# Patient Record
Sex: MALE | Race: WHITE | NOT HISPANIC OR LATINO | Employment: OTHER | URBAN - METROPOLITAN AREA
[De-identification: names, ages, dates, MRNs, and addresses within clinical notes are randomized per-mention and may not be internally consistent; named-entity substitution may affect disease eponyms.]

---

## 2024-05-24 ENCOUNTER — APPOINTMENT (OUTPATIENT)
Dept: CT IMAGING | Facility: HOSPITAL | Age: 49
End: 2024-05-24
Payer: MEDICAID

## 2024-05-24 ENCOUNTER — HOSPITAL ENCOUNTER (INPATIENT)
Facility: HOSPITAL | Age: 49
LOS: 5 days | Discharge: HOME | End: 2024-05-29
Attending: EMERGENCY MEDICINE | Admitting: FAMILY MEDICINE
Payer: MEDICAID

## 2024-05-24 DIAGNOSIS — R19.7 BLOODY DIARRHEA: ICD-10-CM

## 2024-05-24 DIAGNOSIS — R55 PRE-SYNCOPE: ICD-10-CM

## 2024-05-24 DIAGNOSIS — K92.2 ACUTE UPPER GI BLEED: ICD-10-CM

## 2024-05-24 DIAGNOSIS — R10.84 ACUTE GENERALIZED ABDOMINAL PAIN: ICD-10-CM

## 2024-05-24 DIAGNOSIS — T39.395A NSAIDS ADVERSE REACTION, INITIAL ENCOUNTER: ICD-10-CM

## 2024-05-24 DIAGNOSIS — R19.7 ACUTE DIARRHEA: ICD-10-CM

## 2024-05-24 DIAGNOSIS — K92.1 BLOOD IN STOOL: Primary | ICD-10-CM

## 2024-05-24 DIAGNOSIS — N20.1 LEFT URETERAL STONE: ICD-10-CM

## 2024-05-24 LAB
ABO GROUP BLD: NORMAL
ABO GROUP BLD: NORMAL
ALBUMIN SERPL-MCNC: 4.1 G/DL (ref 3.5–5.2)
ALBUMIN/GLOB SERPL: 1.5 G/DL
ALP SERPL-CCNC: 74 U/L (ref 39–117)
ALT SERPL W P-5'-P-CCNC: 16 U/L (ref 1–41)
ANION GAP SERPL CALCULATED.3IONS-SCNC: 11.3 MMOL/L (ref 5–15)
AST SERPL-CCNC: 15 U/L (ref 1–40)
BACTERIA UR QL AUTO: ABNORMAL /HPF
BASOPHILS # BLD AUTO: 0.03 10*3/MM3 (ref 0–0.2)
BASOPHILS NFR BLD AUTO: 0.2 % (ref 0–1.5)
BILIRUB SERPL-MCNC: 0.6 MG/DL (ref 0–1.2)
BILIRUB UR QL STRIP: NEGATIVE
BLD GP AB SCN SERPL QL: NEGATIVE
BUN SERPL-MCNC: 25 MG/DL (ref 6–20)
BUN/CREAT SERPL: 13.2 (ref 7–25)
CALCIUM SPEC-SCNC: 8.9 MG/DL (ref 8.6–10.5)
CHLORIDE SERPL-SCNC: 96 MMOL/L (ref 98–107)
CLARITY UR: CLEAR
CO2 SERPL-SCNC: 28.7 MMOL/L (ref 22–29)
COLOR UR: YELLOW
CREAT SERPL-MCNC: 1.89 MG/DL (ref 0.76–1.27)
D-LACTATE SERPL-SCNC: 1.1 MMOL/L (ref 0.5–2)
DEPRECATED RDW RBC AUTO: 43.8 FL (ref 37–54)
EGFRCR SERPLBLD CKD-EPI 2021: 43 ML/MIN/1.73
EOSINOPHIL # BLD AUTO: 0.1 10*3/MM3 (ref 0–0.4)
EOSINOPHIL NFR BLD AUTO: 0.7 % (ref 0.3–6.2)
ERYTHROCYTE [DISTWIDTH] IN BLOOD BY AUTOMATED COUNT: 14.2 % (ref 12.3–15.4)
GLOBULIN UR ELPH-MCNC: 2.8 GM/DL
GLUCOSE SERPL-MCNC: 117 MG/DL (ref 65–99)
GLUCOSE UR STRIP-MCNC: NEGATIVE MG/DL
HCT VFR BLD AUTO: 36.2 % (ref 37.5–51)
HEMOCCULT STL QL IA: POSITIVE
HGB BLD-MCNC: 11.9 G/DL (ref 13–17.7)
HGB UR QL STRIP.AUTO: NEGATIVE
HOLD SPECIMEN: NORMAL
HOLD SPECIMEN: NORMAL
HYALINE CASTS UR QL AUTO: ABNORMAL /LPF
IMM GRANULOCYTES # BLD AUTO: 0.07 10*3/MM3 (ref 0–0.05)
IMM GRANULOCYTES NFR BLD AUTO: 0.5 % (ref 0–0.5)
INR PPP: 0.96 (ref 0.86–1.15)
KETONES UR QL STRIP: NEGATIVE
LEUKOCYTE ESTERASE UR QL STRIP.AUTO: NEGATIVE
LYMPHOCYTES # BLD AUTO: 0.88 10*3/MM3 (ref 0.7–3.1)
LYMPHOCYTES NFR BLD AUTO: 6.3 % (ref 19.6–45.3)
MAGNESIUM SERPL-MCNC: 2.1 MG/DL (ref 1.6–2.6)
MCH RBC QN AUTO: 27.8 PG (ref 26.6–33)
MCHC RBC AUTO-ENTMCNC: 32.9 G/DL (ref 31.5–35.7)
MCV RBC AUTO: 84.6 FL (ref 79–97)
MONOCYTES # BLD AUTO: 0.79 10*3/MM3 (ref 0.1–0.9)
MONOCYTES NFR BLD AUTO: 5.6 % (ref 5–12)
NEUTROPHILS NFR BLD AUTO: 12.13 10*3/MM3 (ref 1.7–7)
NEUTROPHILS NFR BLD AUTO: 86.7 % (ref 42.7–76)
NITRITE UR QL STRIP: NEGATIVE
NRBC BLD AUTO-RTO: 0 /100 WBC (ref 0–0.2)
PH UR STRIP.AUTO: 7 [PH] (ref 5–8)
PLATELET # BLD AUTO: 283 10*3/MM3 (ref 140–450)
PMV BLD AUTO: 10.4 FL (ref 6–12)
POTASSIUM SERPL-SCNC: 3 MMOL/L (ref 3.5–5.2)
PROT SERPL-MCNC: 6.9 G/DL (ref 6–8.5)
PROT UR QL STRIP: ABNORMAL
PROTHROMBIN TIME: 12.9 SECONDS (ref 11.8–14.9)
RBC # BLD AUTO: 4.28 10*6/MM3 (ref 4.14–5.8)
RBC # UR STRIP: ABNORMAL /HPF
REF LAB TEST METHOD: ABNORMAL
RH BLD: POSITIVE
RH BLD: POSITIVE
SODIUM SERPL-SCNC: 136 MMOL/L (ref 136–145)
SP GR UR STRIP: 1.01 (ref 1–1.03)
SQUAMOUS #/AREA URNS HPF: ABNORMAL /HPF
T&S EXPIRATION DATE: NORMAL
UROBILINOGEN UR QL STRIP: ABNORMAL
WBC # UR STRIP: ABNORMAL /HPF
WBC NRBC COR # BLD AUTO: 14 10*3/MM3 (ref 3.4–10.8)
WHOLE BLOOD HOLD COAG: NORMAL
WHOLE BLOOD HOLD SPECIMEN: NORMAL

## 2024-05-24 PROCEDURE — 86900 BLOOD TYPING SEROLOGIC ABO: CPT

## 2024-05-24 PROCEDURE — 74177 CT ABD & PELVIS W/CONTRAST: CPT

## 2024-05-24 PROCEDURE — 83735 ASSAY OF MAGNESIUM: CPT | Performed by: EMERGENCY MEDICINE

## 2024-05-24 PROCEDURE — 86901 BLOOD TYPING SEROLOGIC RH(D): CPT

## 2024-05-24 PROCEDURE — 86900 BLOOD TYPING SEROLOGIC ABO: CPT | Performed by: EMERGENCY MEDICINE

## 2024-05-24 PROCEDURE — 86850 RBC ANTIBODY SCREEN: CPT | Performed by: EMERGENCY MEDICINE

## 2024-05-24 PROCEDURE — 36415 COLL VENOUS BLD VENIPUNCTURE: CPT | Performed by: EMERGENCY MEDICINE

## 2024-05-24 PROCEDURE — 99285 EMERGENCY DEPT VISIT HI MDM: CPT

## 2024-05-24 PROCEDURE — 85025 COMPLETE CBC W/AUTO DIFF WBC: CPT

## 2024-05-24 PROCEDURE — 82274 ASSAY TEST FOR BLOOD FECAL: CPT

## 2024-05-24 PROCEDURE — 81001 URINALYSIS AUTO W/SCOPE: CPT

## 2024-05-24 PROCEDURE — 36415 COLL VENOUS BLD VENIPUNCTURE: CPT

## 2024-05-24 PROCEDURE — 25810000003 SODIUM CHLORIDE 0.9 % SOLUTION: Performed by: EMERGENCY MEDICINE

## 2024-05-24 PROCEDURE — 86901 BLOOD TYPING SEROLOGIC RH(D): CPT | Performed by: EMERGENCY MEDICINE

## 2024-05-24 PROCEDURE — 83605 ASSAY OF LACTIC ACID: CPT | Performed by: EMERGENCY MEDICINE

## 2024-05-24 PROCEDURE — 80053 COMPREHEN METABOLIC PANEL: CPT

## 2024-05-24 PROCEDURE — 85610 PROTHROMBIN TIME: CPT | Performed by: EMERGENCY MEDICINE

## 2024-05-24 PROCEDURE — 25510000001 IOPAMIDOL PER 1 ML: Performed by: EMERGENCY MEDICINE

## 2024-05-24 RX ORDER — SODIUM CHLORIDE 0.9 % (FLUSH) 0.9 %
10 SYRINGE (ML) INJECTION AS NEEDED
Status: DISCONTINUED | OUTPATIENT
Start: 2024-05-24 | End: 2024-05-29 | Stop reason: HOSPADM

## 2024-05-24 RX ORDER — TAMSULOSIN HYDROCHLORIDE 0.4 MG/1
1 CAPSULE ORAL DAILY
COMMUNITY
End: 2024-05-24

## 2024-05-24 RX ORDER — SODIUM CHLORIDE 9 MG/ML
100 INJECTION, SOLUTION INTRAVENOUS CONTINUOUS
Status: ACTIVE | OUTPATIENT
Start: 2024-05-24 | End: 2024-05-25

## 2024-05-24 RX ORDER — AMLODIPINE BESYLATE 10 MG/1
1 TABLET ORAL DAILY
COMMUNITY
Start: 2024-03-05

## 2024-05-24 RX ORDER — SODIUM CHLORIDE 0.9 % (FLUSH) 0.9 %
10 SYRINGE (ML) INJECTION EVERY 12 HOURS SCHEDULED
Status: DISCONTINUED | OUTPATIENT
Start: 2024-05-24 | End: 2024-05-29 | Stop reason: HOSPADM

## 2024-05-24 RX ORDER — ASPIRIN 325 MG
1 TABLET, DELAYED RELEASE (ENTERIC COATED) ORAL DAILY
COMMUNITY
Start: 2024-03-05 | End: 2024-05-29 | Stop reason: HOSPADM

## 2024-05-24 RX ORDER — POTASSIUM CHLORIDE 750 MG/1
40 CAPSULE, EXTENDED RELEASE ORAL ONCE
Status: COMPLETED | OUTPATIENT
Start: 2024-05-24 | End: 2024-05-25

## 2024-05-24 RX ORDER — PANTOPRAZOLE SODIUM 40 MG/1
1 TABLET, DELAYED RELEASE ORAL DAILY
COMMUNITY
Start: 2024-02-13

## 2024-05-24 RX ORDER — ONDANSETRON 2 MG/ML
4 INJECTION INTRAMUSCULAR; INTRAVENOUS EVERY 6 HOURS PRN
Status: DISCONTINUED | OUTPATIENT
Start: 2024-05-24 | End: 2024-05-29 | Stop reason: HOSPADM

## 2024-05-24 RX ORDER — TAMSULOSIN HYDROCHLORIDE 0.4 MG/1
0.4 CAPSULE ORAL DAILY
Status: DISCONTINUED | OUTPATIENT
Start: 2024-05-24 | End: 2024-05-29 | Stop reason: HOSPADM

## 2024-05-24 RX ORDER — METOPROLOL SUCCINATE 50 MG/1
50 TABLET, EXTENDED RELEASE ORAL EVERY EVENING
COMMUNITY

## 2024-05-24 RX ORDER — HYDRALAZINE HYDROCHLORIDE 25 MG/1
25 TABLET, FILM COATED ORAL 2 TIMES DAILY
COMMUNITY

## 2024-05-24 RX ORDER — SEMAGLUTIDE 0.68 MG/ML
0.5 INJECTION, SOLUTION SUBCUTANEOUS WEEKLY
COMMUNITY
Start: 2024-02-19 | End: 2024-06-03

## 2024-05-24 RX ORDER — PANTOPRAZOLE SODIUM 40 MG/10ML
80 INJECTION, POWDER, LYOPHILIZED, FOR SOLUTION INTRAVENOUS ONCE
Status: COMPLETED | OUTPATIENT
Start: 2024-05-24 | End: 2024-05-24

## 2024-05-24 RX ORDER — CLOPIDOGREL BISULFATE 75 MG/1
1 TABLET ORAL DAILY
COMMUNITY
Start: 2024-01-24

## 2024-05-24 RX ORDER — BISACODYL 5 MG/1
20 TABLET, DELAYED RELEASE ORAL ONCE
Qty: 4 TABLET | Refills: 0 | Status: COMPLETED | OUTPATIENT
Start: 2024-05-25 | End: 2024-05-25

## 2024-05-24 RX ORDER — POLYETHYLENE GLYCOL 3350 17 G/17G
17 POWDER ORAL DAILY
COMMUNITY
Start: 2024-05-04

## 2024-05-24 RX ORDER — SODIUM CHLORIDE 9 MG/ML
40 INJECTION, SOLUTION INTRAVENOUS AS NEEDED
Status: DISCONTINUED | OUTPATIENT
Start: 2024-05-24 | End: 2024-05-29 | Stop reason: HOSPADM

## 2024-05-24 RX ORDER — MORPHINE SULFATE 2 MG/ML
1 INJECTION, SOLUTION INTRAMUSCULAR; INTRAVENOUS EVERY 4 HOURS PRN
Status: DISCONTINUED | OUTPATIENT
Start: 2024-05-24 | End: 2024-05-29 | Stop reason: HOSPADM

## 2024-05-24 RX ORDER — LOSARTAN POTASSIUM AND HYDROCHLOROTHIAZIDE 25; 100 MG/1; MG/1
1 TABLET ORAL DAILY
COMMUNITY
Start: 2024-01-29

## 2024-05-24 RX ORDER — NALOXONE HCL 0.4 MG/ML
0.4 VIAL (ML) INJECTION
Status: DISCONTINUED | OUTPATIENT
Start: 2024-05-24 | End: 2024-05-29 | Stop reason: HOSPADM

## 2024-05-24 RX ORDER — ERGOCALCIFEROL 1.25 MG/1
1 CAPSULE ORAL WEEKLY
COMMUNITY
Start: 2024-02-12

## 2024-05-24 RX ORDER — CLONIDINE HYDROCHLORIDE 0.1 MG/1
1 TABLET ORAL 2 TIMES DAILY
COMMUNITY
Start: 2024-03-05

## 2024-05-24 RX ADMIN — IOPAMIDOL 100 ML: 755 INJECTION, SOLUTION INTRAVENOUS at 17:52

## 2024-05-24 RX ADMIN — SODIUM CHLORIDE 1000 ML: 9 INJECTION, SOLUTION INTRAVENOUS at 18:07

## 2024-05-24 RX ADMIN — PANTOPRAZOLE SODIUM 80 MG: 40 INJECTION, POWDER, FOR SOLUTION INTRAVENOUS at 18:06

## 2024-05-24 NOTE — ED PROVIDER NOTES
Time: 2:07 PM EDT  Date of encounter:  5/24/2024  Independent Historian/Clinical History and Information was obtained by:   Patient    History is limited by: N/A    Chief Complaint: GI bleeding, diarrhea      History of Present Illness:  Patient is a 49 y.o. year old male who presents to the emergency department for evaluation of bloody diarrhea that started last night.  Denies nausea or vomiting.    He states he has been having ongoing issues with diarrhea and alternating constipation ever since being started on Ozempic.    He took his last dose several days ago and has been having severe diarrhea this week and had some bloody stool last night and presents to the ED today but when he arrived to the emergency department he had a large dark or black stools that look like tar with some maroon blood present as well.    He is not on anticoagulant medication but does take Plavix and possibly aspirin.    After his bloody bowel movement here in the ED he felt lightheaded like he might pass out.    He has no previous history of diagnosed stomach ulcers but does have history of GERD.  No history of diverticulitis.    He now feels completely asymptomatic and denies any abdominal pain or nausea after this bowel movement in the ED.    HPI    Patient Care Team  Primary Care Provider: Manan Gipson APRN    Past Medical History:     No Known Allergies  History reviewed. No pertinent past medical history.  History reviewed. No pertinent surgical history.  History reviewed. No pertinent family history.    Home Medications:  Prior to Admission medications    Not on File        Social History:   Social History     Tobacco Use    Smoking status: Never    Smokeless tobacco: Never   Vaping Use    Vaping status: Never Used   Substance Use Topics    Alcohol use: Not Currently    Drug use: Never     Lives at home, family at the bedside.    Review of Systems:  Review of Systems   Gastrointestinal:  Positive for blood in stool and diarrhea.  "Negative for abdominal pain, nausea and vomiting.        Physical Exam:  /87 (BP Location: Right arm, Patient Position: Lying)   Pulse 93   Temp 98.6 °F (37 °C) (Oral)   Resp 20   Ht 177.8 cm (70\")   Wt 134 kg (295 lb 3.1 oz)   SpO2 97%   BMI 42.36 kg/m²       General: Awake alert and in no obvious distress    HEENT: Head normocephalic atraumatic, eyes PERRLA EOMI, nose normal, oropharynx normal.    Neck: Supple full range of motion, no meningismus, no lymphadenopathy    Heart: Regular rate and rhythm, no murmurs or rubs, 2+ radial pulses bilaterally    Lungs: Clear to auscultation bilaterally without wheezes or crackles, no respiratory distress    Abdomen: Soft, mildly tender throughout the upper abdomen but no peritonitis, nondistended, no rebound or guarding    Skin: Warm, dry, no rash    Musculoskeletal: Normal range of motion, no lower extremity edema    Neurologic: Oriented x3, no motor deficits no sensory deficits    Psychiatric: Mood appears stable, no psychosis          Procedures:  Procedures      Medical Decision Making:      Comorbidities that affect care:    None    External Notes reviewed:    Previous Labs: I compared today's lab results to his baseline labs and it looks like he is acutely anemic and elevated white blood cell count.      The following orders were placed and all results were independently analyzed by me:  Orders Placed This Encounter   Procedures    Clostridioides difficile Toxin - Stool, Per Rectum    Clostridioides difficile Toxin, PCR - Stool, Per Rectum    CT Abdomen Pelvis With Contrast    [place] Draw    Comprehensive Metabolic Panel    Occult Blood, Fecal By Immunoassay - Stool, Per Rectum    CBC Auto Differential    Urinalysis With Microscopic If Indicated (No Culture) - Urine, Clean Catch    Urinalysis, Microscopic Only - Urine, Clean Catch    Magnesium    Lactic Acid, Plasma    Protime-INR    NPO Diet NPO Type: Strict NPO    Undress & Gown    Measure Blood " Pressure    Vital Signs    Orthostatic Blood Pressure    Gastroenterology (on-call MD unless specified)    Hospitalist (on-call MD unless specified)    Urology (on-call MD unless specified)    Patient Isolation Contact Spore    Pulse Oximetry    Oxygen Therapy- Nasal Cannula; Titrate 1-6 LPM Per SpO2; 90 - 95%    Type & Screen    ABO RH Specimen Verification    Insert Peripheral IV    Insert Peripheral IV    CBC & Differential    Green Top (Gel)    Lavender Top    Gold Top - SST    Light Blue Top       Medications Given in the Emergency Department:  Medications   sodium chloride 0.9 % flush 10 mL (has no administration in time range)   sodium chloride 0.9 % flush 10 mL (has no administration in time range)   sodium chloride 0.9 % bolus 1,000 mL (0 mL Intravenous Stopped 5/24/24 2042)   pantoprazole (PROTONIX) injection 80 mg (80 mg Intravenous Given 5/24/24 1806)   iopamidol (ISOVUE-370) 76 % injection 100 mL (100 mL Intravenous Given 5/24/24 1752)        ED Course:    ED Course as of 05/24/24 2117   Fri May 24, 2024   1408 --- PROVIDER IN TRIAGE NOTE ---    The patient was evaluated by Theo castillo in triage. Orders were placed and the patient is currently awaiting disposition.    [AJ]      ED Course User Index  [AJ] Theo Maurer PA-C       Labs:    Lab Results (last 24 hours)       Procedure Component Value Units Date/Time    CBC & Differential [[id number]]  (Abnormal) Collected: 05/24/24 1415    Specimen: Blood from Arm, Right Updated: 05/24/24 1440    Narrative:      The following orders were created for panel order CBC & Differential.  Procedure                               Abnormality         Status                     ---------                               -----------         ------                     CBC Auto Differential[[id number]]        Abnormal            Final result                 Please view results for these tests on the individual orders.    Comprehensive Metabolic Panel  [[id number]]  (Abnormal) Collected: 05/24/24 1415    Specimen: Blood from Arm, Right Updated: 05/24/24 1457     Glucose 117 mg/dL      BUN 25 mg/dL      Creatinine 1.89 mg/dL      Sodium 136 mmol/L      Potassium 3.0 mmol/L      Chloride 96 mmol/L      CO2 28.7 mmol/L      Calcium 8.9 mg/dL      Total Protein 6.9 g/dL      Albumin 4.1 g/dL      ALT (SGPT) 16 U/L      AST (SGOT) 15 U/L      Alkaline Phosphatase 74 U/L      Total Bilirubin 0.6 mg/dL      Globulin 2.8 gm/dL      A/G Ratio 1.5 g/dL      BUN/Creatinine Ratio 13.2     Anion Gap 11.3 mmol/L      eGFR 43.0 mL/min/1.73     Narrative:      GFR Normal >60  Chronic Kidney Disease <60  Kidney Failure <15      CBC Auto Differential [[id number]]  (Abnormal) Collected: 05/24/24 1415    Specimen: Blood from Arm, Right Updated: 05/24/24 1440     WBC 14.00 10*3/mm3      RBC 4.28 10*6/mm3      Hemoglobin 11.9 g/dL      Hematocrit 36.2 %      MCV 84.6 fL      MCH 27.8 pg      MCHC 32.9 g/dL      RDW 14.2 %      RDW-SD 43.8 fl      MPV 10.4 fL      Platelets 283 10*3/mm3      Neutrophil % 86.7 %      Lymphocyte % 6.3 %      Monocyte % 5.6 %      Eosinophil % 0.7 %      Basophil % 0.2 %      Immature Grans % 0.5 %      Neutrophils, Absolute 12.13 10*3/mm3      Lymphocytes, Absolute 0.88 10*3/mm3      Monocytes, Absolute 0.79 10*3/mm3      Eosinophils, Absolute 0.10 10*3/mm3      Basophils, Absolute 0.03 10*3/mm3      Immature Grans, Absolute 0.07 10*3/mm3      nRBC 0.0 /100 WBC     Urinalysis With Microscopic If Indicated (No Culture) - Urine, Clean Catch [[id number]]  (Abnormal) Collected: 05/24/24 1423    Specimen: Urine, Clean Catch Updated: 05/24/24 1448     Color, UA Yellow     Appearance, UA Clear     pH, UA 7.0     Specific Gravity, UA 1.015     Glucose, UA Negative     Ketones, UA Negative     Bilirubin, UA Negative     Blood, UA Negative     Protein, UA 30 mg/dL (1+)     Leuk Esterase, UA Negative     Nitrite, UA Negative     Urobilinogen, UA 0.2 E.U./dL     Urinalysis, Microscopic Only - Urine, Clean Catch [[id number]]  (Abnormal) Collected: 05/24/24 1423    Specimen: Urine, Clean Catch Updated: 05/24/24 1448     RBC, UA 3-5 /HPF      WBC, UA 0-2 /HPF      Bacteria, UA None Seen /HPF      Squamous Epithelial Cells, UA 0-2 /HPF      Hyaline Casts, UA 3-6 /LPF      Methodology Automated Microscopy    Occult Blood, Fecal By Immunoassay - Stool, Per Rectum [[id number]]  (Abnormal) Collected: 05/24/24 1547    Specimen: Stool from Per Rectum Updated: 05/24/24 1603     Occult Blood, Fecal by Immunoassay Positive    Magnesium [[id number]]  (Normal) Collected: 05/24/24 1742    Specimen: Blood Updated: 05/24/24 1757     Magnesium 2.1 mg/dL              Imaging:    CT Abdomen Pelvis With Contrast    Result Date: 5/24/2024  CT ABDOMEN PELVIS W CONTRAST-  Date of Exam: 5/24/2024 5:41 PM  Indication: Abdominal pain, melena; R10.84-Generalized abdominal pain; K92.2-Gastrointestinal hemorrhage, unspecified.  Comparison: None available.  Technique: Axial CT images were obtained of the abdomen and pelvis following the uneventful intravenous administration of 100 cc Isovue-370 . Reconstructed coronal and sagittal images were also obtained. Automated exposure control and iterative construction methods were used.   Findings: Visualized lung bases are clear. Liver, pancreas, and spleen are within normal limits. Gallbladder appears normal. No evidence of biliary tract obstruction.  Bilateral adrenal glands are within normal limits. There is mild left-sided hydronephrosis with delayed nephrogram. There is an obstructing 8 mm stone within the mid left ureter. Smaller punctate nonobstructing stones are seen within the left kidney. No other ureteral stones are identified. No right-sided renal or ureteral stone. The upper GI tract is within normal limits. There is no abdominal or retroperitoneal adenopathy.  Pelvis: Urinary bladder is within normal limits. GI tract including appendix is normal. No  pelvic or inguinal adenopathy. No free intraperitoneal fluid.  No lytic or sclerotic bony lesions are identified.      Impression:  1. Mild left-sided hydroureteronephrosis with delayed nephrogram secondary to an obstructing 8 mm stone within the mid left ureter. There are additional punctate nonobstructing left renal stones.    Electronically Signed By-Alec Lawrence MD On:5/24/2024 6:20 PM         Differential Diagnosis and Discussion:    GI Bleeding: Differential diagnosis includes but is not limited to gastritis, gastric ulcer, stress ulcer, duodenitis, Susannah-[place] tears, esophageal varices, angiodysplasia, aortic enteric fistula, hematologic issues including thrombocytopenia, GI neoplasm, ulcerative colitis, Crohn's disease, diverticulosis, diverticulitis, hemorrhoids, aortic aneurysm, and polyps    All labs were reviewed and interpreted by me.  CT scan radiology impression was interpreted by me.    MDM     Amount and/or Complexity of Data Reviewed  Clinical lab tests: reviewed  Tests in the radiology section of CPT®: reviewed  Decide to obtain previous medical records or to obtain history from someone other than the patient: yes           This patient is a 49-year-old male presenting with severe diarrhea since the last few days and now with black tarry stools and some GI bleeding today.    Lab work shows a low hemoglobin of 11.9 which is down from his baseline of 14.    I am hydrating with IV fluids and giving him a dose of IV Protonix.    He does not have any known history of bleeding ulcers or liver disease.    I am getting a CT of the abdomen pelvis to evaluate further as he had some abdominal pain.    I will consult with gastroenterology.    Patient has now had a second bloody bowel movement in the ED.    I have spoken with our gastroenterologist on-call who recommends n.p.o. after midnight with plan for bowel prep and we will plan to scope him both EGD and colonoscopy tomorrow around noon time.    In  addition, incidental finding of obstructing large left ureteral stone with hydronephrosis seen on CT imaging and I consulted with Dr. Bardales of urology who will see him in the hospital.                Patient Care Considerations:          Consultants/Shared Management Plan:    Hospitalist: I have discussed the case with admitting hospitalist who agrees to accept the patient for admission.  Consultant: I have discussed the case with Dr. Bardales of urology and Dr. Zaragoza of gastroenterology, who agrees to consult on the patient.    Social Determinants of Health:    Patient is independent, reliable, and has access to care.       Disposition and Care Coordination:    Admit:   Through independent evaluation of the patient's history, physical, and imperical data, the patient meets criteria for inpatient admission to the hospital.        Final diagnoses:   Acute generalized abdominal pain   Acute upper GI bleed   Left ureteral stone        ED Disposition       ED Disposition   Intended Admit    Condition   --    Comment   --               This medical record created using voice recognition software.             lEiecer Gandara MD  05/24/24 0308

## 2024-05-25 ENCOUNTER — ANESTHESIA (OUTPATIENT)
Dept: PERIOP | Facility: HOSPITAL | Age: 49
End: 2024-05-25
Payer: MEDICAID

## 2024-05-25 ENCOUNTER — ANESTHESIA EVENT (OUTPATIENT)
Dept: PERIOP | Facility: HOSPITAL | Age: 49
End: 2024-05-25
Payer: MEDICAID

## 2024-05-25 PROBLEM — I10 HTN (HYPERTENSION): Status: ACTIVE | Noted: 2024-05-25

## 2024-05-25 PROBLEM — R19.7 ACUTE DIARRHEA: Status: ACTIVE | Noted: 2024-05-24

## 2024-05-25 PROBLEM — N20.1 LEFT URETERAL STONE: Status: ACTIVE | Noted: 2024-05-25

## 2024-05-25 PROBLEM — T39.395A: Status: ACTIVE | Noted: 2024-05-24

## 2024-05-25 PROBLEM — K92.1 BLOOD IN STOOL: Status: ACTIVE | Noted: 2024-05-24

## 2024-05-25 PROBLEM — R55 PRE-SYNCOPE: Status: ACTIVE | Noted: 2024-05-24

## 2024-05-25 LAB
027 TOXIN: NORMAL
ANION GAP SERPL CALCULATED.3IONS-SCNC: 8.7 MMOL/L (ref 5–15)
BUN SERPL-MCNC: 20 MG/DL (ref 6–20)
BUN/CREAT SERPL: 11.8 (ref 7–25)
C COLI+JEJ+UPSA DNA STL QL NAA+NON-PROBE: NOT DETECTED
C DIFF TOX GENS STL QL NAA+PROBE: NEGATIVE
CALCIUM SPEC-SCNC: 8.5 MG/DL (ref 8.6–10.5)
CHLORIDE SERPL-SCNC: 101 MMOL/L (ref 98–107)
CO2 SERPL-SCNC: 28.3 MMOL/L (ref 22–29)
CREAT SERPL-MCNC: 1.7 MG/DL (ref 0.76–1.27)
CRP SERPL-MCNC: 2.9 MG/DL (ref 0–0.5)
DEPRECATED RDW RBC AUTO: 43.8 FL (ref 37–54)
EC STX1+STX2 GENES STL QL NAA+NON-PROBE: NOT DETECTED
EGFRCR SERPLBLD CKD-EPI 2021: 48.8 ML/MIN/1.73
ERYTHROCYTE [DISTWIDTH] IN BLOOD BY AUTOMATED COUNT: 14.1 % (ref 12.3–15.4)
ERYTHROCYTE [SEDIMENTATION RATE] IN BLOOD: 27 MM/HR (ref 0–15)
GLUCOSE BLDC GLUCOMTR-MCNC: 100 MG/DL (ref 70–99)
GLUCOSE SERPL-MCNC: 146 MG/DL (ref 65–99)
HCT VFR BLD AUTO: 34.7 % (ref 37.5–51)
HGB BLD-MCNC: 11.4 G/DL (ref 13–17.7)
LACTOFERRIN STL QL LA: POSITIVE
MCH RBC QN AUTO: 27.9 PG (ref 26.6–33)
MCHC RBC AUTO-ENTMCNC: 32.9 G/DL (ref 31.5–35.7)
MCV RBC AUTO: 84.8 FL (ref 79–97)
PLATELET # BLD AUTO: 239 10*3/MM3 (ref 140–450)
PMV BLD AUTO: 10.5 FL (ref 6–12)
POTASSIUM SERPL-SCNC: 3.4 MMOL/L (ref 3.5–5.2)
RBC # BLD AUTO: 4.09 10*6/MM3 (ref 4.14–5.8)
S ENT+BONG DNA STL QL NAA+NON-PROBE: NOT DETECTED
SHIGELLA SP+EIEC IPAH ST NAA+NON-PROBE: NOT DETECTED
SODIUM SERPL-SCNC: 138 MMOL/L (ref 136–145)
WBC NRBC COR # BLD AUTO: 9.46 10*3/MM3 (ref 3.4–10.8)

## 2024-05-25 PROCEDURE — 25810000003 SODIUM CHLORIDE 0.9 % SOLUTION: Performed by: FAMILY MEDICINE

## 2024-05-25 PROCEDURE — 88305 TISSUE EXAM BY PATHOLOGIST: CPT | Performed by: INTERNAL MEDICINE

## 2024-05-25 PROCEDURE — 43235 EGD DIAGNOSTIC BRUSH WASH: CPT | Performed by: INTERNAL MEDICINE

## 2024-05-25 PROCEDURE — 80048 BASIC METABOLIC PNL TOTAL CA: CPT | Performed by: FAMILY MEDICINE

## 2024-05-25 PROCEDURE — 87505 NFCT AGENT DETECTION GI: CPT | Performed by: INTERNAL MEDICINE

## 2024-05-25 PROCEDURE — 25810000003 SODIUM CHLORIDE 0.9 % SOLUTION: Performed by: INTERNAL MEDICINE

## 2024-05-25 PROCEDURE — 87506 IADNA-DNA/RNA PROBE TQ 6-11: CPT | Performed by: INTERNAL MEDICINE

## 2024-05-25 PROCEDURE — 86140 C-REACTIVE PROTEIN: CPT | Performed by: INTERNAL MEDICINE

## 2024-05-25 PROCEDURE — 0W3P8ZZ CONTROL BLEEDING IN GASTROINTESTINAL TRACT, VIA NATURAL OR ARTIFICIAL OPENING ENDOSCOPIC: ICD-10-PCS | Performed by: INTERNAL MEDICINE

## 2024-05-25 PROCEDURE — 99223 1ST HOSP IP/OBS HIGH 75: CPT | Performed by: FAMILY MEDICINE

## 2024-05-25 PROCEDURE — 99222 1ST HOSP IP/OBS MODERATE 55: CPT | Performed by: UROLOGY

## 2024-05-25 PROCEDURE — 85027 COMPLETE CBC AUTOMATED: CPT | Performed by: FAMILY MEDICINE

## 2024-05-25 PROCEDURE — 99254 IP/OBS CNSLTJ NEW/EST MOD 60: CPT | Performed by: INTERNAL MEDICINE

## 2024-05-25 PROCEDURE — 25010000002 SUGAMMADEX 200 MG/2ML SOLUTION: Performed by: ANESTHESIOLOGY

## 2024-05-25 PROCEDURE — 25010000002 GLYCOPYRROLATE 0.2 MG/ML SOLUTION: Performed by: ANESTHESIOLOGY

## 2024-05-25 PROCEDURE — 45380 COLONOSCOPY AND BIOPSY: CPT | Performed by: INTERNAL MEDICINE

## 2024-05-25 PROCEDURE — 45382 COLONOSCOPY W/CONTROL BLEED: CPT | Performed by: INTERNAL MEDICINE

## 2024-05-25 PROCEDURE — 82948 REAGENT STRIP/BLOOD GLUCOSE: CPT | Performed by: ANESTHESIOLOGY

## 2024-05-25 PROCEDURE — 25010000002 MIDAZOLAM PER 1MG: Performed by: ANESTHESIOLOGY

## 2024-05-25 PROCEDURE — 83630 LACTOFERRIN FECAL (QUAL): CPT | Performed by: INTERNAL MEDICINE

## 2024-05-25 PROCEDURE — 0DBE8ZX EXCISION OF LARGE INTESTINE, VIA NATURAL OR ARTIFICIAL OPENING ENDOSCOPIC, DIAGNOSTIC: ICD-10-PCS | Performed by: INTERNAL MEDICINE

## 2024-05-25 PROCEDURE — 25010000002 ONDANSETRON PER 1 MG: Performed by: ANESTHESIOLOGY

## 2024-05-25 PROCEDURE — 25010000002 PROPOFOL 200 MG/20ML EMULSION: Performed by: ANESTHESIOLOGY

## 2024-05-25 PROCEDURE — 85652 RBC SED RATE AUTOMATED: CPT | Performed by: INTERNAL MEDICINE

## 2024-05-25 PROCEDURE — 25010000002 ERYTHROMYCIN LACTOBIONATE PER 500 MG: Performed by: INTERNAL MEDICINE

## 2024-05-25 PROCEDURE — 87493 C DIFF AMPLIFIED PROBE: CPT | Performed by: EMERGENCY MEDICINE

## 2024-05-25 DEVICE — DEV CLIP ENDO RESOLUTION360 CONTRL ROT 235CM: Type: IMPLANTABLE DEVICE | Site: SIGMOID COLON | Status: FUNCTIONAL

## 2024-05-25 RX ORDER — OXYCODONE HYDROCHLORIDE 5 MG/1
5 TABLET ORAL
Status: DISCONTINUED | OUTPATIENT
Start: 2024-05-25 | End: 2024-05-25

## 2024-05-25 RX ORDER — METOPROLOL SUCCINATE 50 MG/1
50 TABLET, EXTENDED RELEASE ORAL EVERY EVENING
Status: DISCONTINUED | OUTPATIENT
Start: 2024-05-25 | End: 2024-05-29 | Stop reason: HOSPADM

## 2024-05-25 RX ORDER — PROMETHAZINE HYDROCHLORIDE 25 MG/1
25 SUPPOSITORY RECTAL ONCE AS NEEDED
Status: DISCONTINUED | OUTPATIENT
Start: 2024-05-25 | End: 2024-05-25

## 2024-05-25 RX ORDER — GLYCOPYRROLATE 0.2 MG/ML
INJECTION INTRAMUSCULAR; INTRAVENOUS AS NEEDED
Status: DISCONTINUED | OUTPATIENT
Start: 2024-05-25 | End: 2024-05-25 | Stop reason: SURG

## 2024-05-25 RX ORDER — PANTOPRAZOLE SODIUM 40 MG/10ML
40 INJECTION, POWDER, LYOPHILIZED, FOR SOLUTION INTRAVENOUS
Status: DISCONTINUED | OUTPATIENT
Start: 2024-05-25 | End: 2024-05-29

## 2024-05-25 RX ORDER — ONDANSETRON 2 MG/ML
4 INJECTION INTRAMUSCULAR; INTRAVENOUS ONCE AS NEEDED
Status: DISCONTINUED | OUTPATIENT
Start: 2024-05-25 | End: 2024-05-25

## 2024-05-25 RX ORDER — PROMETHAZINE HYDROCHLORIDE 12.5 MG/1
25 TABLET ORAL ONCE AS NEEDED
Status: DISCONTINUED | OUTPATIENT
Start: 2024-05-25 | End: 2024-05-25

## 2024-05-25 RX ORDER — AMLODIPINE BESYLATE 10 MG/1
10 TABLET ORAL DAILY
Status: DISCONTINUED | OUTPATIENT
Start: 2024-05-25 | End: 2024-05-29 | Stop reason: HOSPADM

## 2024-05-25 RX ORDER — ASPIRIN 325 MG
325 TABLET, DELAYED RELEASE (ENTERIC COATED) ORAL DAILY
Status: DISCONTINUED | OUTPATIENT
Start: 2024-05-25 | End: 2024-05-27

## 2024-05-25 RX ORDER — PANTOPRAZOLE SODIUM 40 MG/10ML
40 INJECTION, POWDER, LYOPHILIZED, FOR SOLUTION INTRAVENOUS
Status: DISCONTINUED | OUTPATIENT
Start: 2024-05-25 | End: 2024-05-25

## 2024-05-25 RX ORDER — HYDROCHLOROTHIAZIDE 25 MG/1
25 TABLET ORAL
Status: DISCONTINUED | OUTPATIENT
Start: 2024-05-25 | End: 2024-05-29 | Stop reason: HOSPADM

## 2024-05-25 RX ORDER — LOSARTAN POTASSIUM 50 MG/1
100 TABLET ORAL
Status: DISCONTINUED | OUTPATIENT
Start: 2024-05-25 | End: 2024-05-29 | Stop reason: HOSPADM

## 2024-05-25 RX ORDER — ROCURONIUM BROMIDE 10 MG/ML
INJECTION, SOLUTION INTRAVENOUS AS NEEDED
Status: DISCONTINUED | OUTPATIENT
Start: 2024-05-25 | End: 2024-05-25 | Stop reason: SURG

## 2024-05-25 RX ORDER — MEPERIDINE HYDROCHLORIDE 25 MG/ML
12.5 INJECTION INTRAMUSCULAR; INTRAVENOUS; SUBCUTANEOUS
Status: DISCONTINUED | OUTPATIENT
Start: 2024-05-25 | End: 2024-05-25

## 2024-05-25 RX ORDER — MIDAZOLAM HYDROCHLORIDE 2 MG/2ML
INJECTION, SOLUTION INTRAMUSCULAR; INTRAVENOUS AS NEEDED
Status: DISCONTINUED | OUTPATIENT
Start: 2024-05-25 | End: 2024-05-25 | Stop reason: SURG

## 2024-05-25 RX ORDER — SUCCINYLCHOLINE/SOD CL,ISO/PF 100 MG/5ML
SYRINGE (ML) INTRAVENOUS AS NEEDED
Status: DISCONTINUED | OUTPATIENT
Start: 2024-05-25 | End: 2024-05-25 | Stop reason: SURG

## 2024-05-25 RX ORDER — LIDOCAINE HYDROCHLORIDE 20 MG/ML
INJECTION, SOLUTION EPIDURAL; INFILTRATION; INTRACAUDAL; PERINEURAL AS NEEDED
Status: DISCONTINUED | OUTPATIENT
Start: 2024-05-25 | End: 2024-05-25 | Stop reason: SURG

## 2024-05-25 RX ORDER — PROPOFOL 10 MG/ML
INJECTION, EMULSION INTRAVENOUS AS NEEDED
Status: DISCONTINUED | OUTPATIENT
Start: 2024-05-25 | End: 2024-05-25 | Stop reason: SURG

## 2024-05-25 RX ORDER — CLOPIDOGREL BISULFATE 75 MG/1
75 TABLET ORAL DAILY
Status: DISCONTINUED | OUTPATIENT
Start: 2024-05-25 | End: 2024-05-29 | Stop reason: HOSPADM

## 2024-05-25 RX ORDER — ONDANSETRON 2 MG/ML
INJECTION INTRAMUSCULAR; INTRAVENOUS AS NEEDED
Status: DISCONTINUED | OUTPATIENT
Start: 2024-05-25 | End: 2024-05-25 | Stop reason: SURG

## 2024-05-25 RX ORDER — CLONIDINE HYDROCHLORIDE 0.1 MG/1
0.1 TABLET ORAL 2 TIMES DAILY
Status: DISCONTINUED | OUTPATIENT
Start: 2024-05-25 | End: 2024-05-29 | Stop reason: HOSPADM

## 2024-05-25 RX ADMIN — POTASSIUM CHLORIDE 40 MEQ: 750 CAPSULE, EXTENDED RELEASE ORAL at 00:57

## 2024-05-25 RX ADMIN — PANTOPRAZOLE SODIUM 40 MG: 40 INJECTION, POWDER, FOR SOLUTION INTRAVENOUS at 17:15

## 2024-05-25 RX ADMIN — Medication 10 ML: at 01:06

## 2024-05-25 RX ADMIN — Medication 10 ML: at 20:35

## 2024-05-25 RX ADMIN — Medication 200 MG: at 14:10

## 2024-05-25 RX ADMIN — LOSARTAN POTASSIUM 100 MG: 50 TABLET, FILM COATED ORAL at 09:46

## 2024-05-25 RX ADMIN — METOPROLOL SUCCINATE 50 MG: 50 TABLET, EXTENDED RELEASE ORAL at 17:15

## 2024-05-25 RX ADMIN — ROCURONIUM BROMIDE 5 MG: 10 INJECTION, SOLUTION INTRAVENOUS at 15:15

## 2024-05-25 RX ADMIN — CLONIDINE HYDROCHLORIDE 0.1 MG: 0.1 TABLET ORAL at 09:46

## 2024-05-25 RX ADMIN — BISACODYL 20 MG: 5 TABLET, COATED ORAL at 01:05

## 2024-05-25 RX ADMIN — CLONIDINE HYDROCHLORIDE 0.1 MG: 0.1 TABLET ORAL at 20:35

## 2024-05-25 RX ADMIN — ERYTHROMYCIN LACTOBIONATE 125 MG: 500 INJECTION, POWDER, LYOPHILIZED, FOR SOLUTION INTRAVENOUS at 10:59

## 2024-05-25 RX ADMIN — HYDROCHLOROTHIAZIDE 25 MG: 25 TABLET ORAL at 09:46

## 2024-05-25 RX ADMIN — MIDAZOLAM HYDROCHLORIDE 2 MG: 1 INJECTION, SOLUTION INTRAMUSCULAR; INTRAVENOUS at 14:02

## 2024-05-25 RX ADMIN — ROCURONIUM BROMIDE 35 MG: 10 INJECTION, SOLUTION INTRAVENOUS at 14:19

## 2024-05-25 RX ADMIN — SODIUM CHLORIDE 100 ML/HR: 9 INJECTION, SOLUTION INTRAVENOUS at 11:00

## 2024-05-25 RX ADMIN — LIDOCAINE HYDROCHLORIDE 100 MG: 20 INJECTION, SOLUTION EPIDURAL; INFILTRATION; INTRACAUDAL; PERINEURAL at 14:10

## 2024-05-25 RX ADMIN — GLYCOPYRROLATE 0.2 MG: 0.2 INJECTION INTRAMUSCULAR; INTRAVENOUS at 14:02

## 2024-05-25 RX ADMIN — SUGAMMADEX 200 MG: 100 INJECTION, SOLUTION INTRAVENOUS at 15:26

## 2024-05-25 RX ADMIN — POLYETHYLENE GLYCOL 3350, SODIUM SULFATE ANHYDROUS, SODIUM BICARBONATE, SODIUM CHLORIDE, POTASSIUM CHLORIDE 2000 ML: 236; 22.74; 6.74; 5.86; 2.97 POWDER, FOR SOLUTION ORAL at 01:03

## 2024-05-25 RX ADMIN — PROPOFOL 200 MG: 10 INJECTION, EMULSION INTRAVENOUS at 14:10

## 2024-05-25 RX ADMIN — ONDANSETRON 4 MG: 2 INJECTION INTRAMUSCULAR; INTRAVENOUS at 15:13

## 2024-05-25 RX ADMIN — AMLODIPINE BESYLATE 10 MG: 10 TABLET ORAL at 09:46

## 2024-05-25 RX ADMIN — Medication 10 ML: at 09:46

## 2024-05-25 RX ADMIN — ROCURONIUM BROMIDE 5 MG: 10 INJECTION, SOLUTION INTRAVENOUS at 14:10

## 2024-05-25 RX ADMIN — TAMSULOSIN HYDROCHLORIDE 0.4 MG: 0.4 CAPSULE ORAL at 01:21

## 2024-05-25 RX ADMIN — SODIUM CHLORIDE 100 ML/HR: 9 INJECTION, SOLUTION INTRAVENOUS at 01:06

## 2024-05-25 RX ADMIN — SODIUM CHLORIDE 100 ML/HR: 9 INJECTION, SOLUTION INTRAVENOUS at 17:18

## 2024-05-25 NOTE — PAYOR COMM NOTE
"Robert Valdez (49 y.o. Male)     PATIENT INFORMATION  Name:  Robert Valdez  MRN#:     [id number]  :  1975         ADMISSION INFORMATION  CLASS: Inpatient   DOS:  24        CURRENT ATTENDING PROVIDER INFORMATION  Name/NPI: Parvez Francisco DO [[id number]]  Phone:             Phone: [phone]        REQUESTING PROVIDER and RENDERING FACILITY  Name:  [facility]   NPI:  [id number]  TID:  [id number]  Address:      [facility] [address] [place] [address]  Phone  [phone]        UTILIZATION REVIEW CONTACT INFORMATION  Phone:      [phone]  Fax:           [phone]        ADMISSION DIAGNOSIS  GI bleed [K92.2]  Dizziness R42  Acute generalized abdominal pain [R10.84]  Left ureteral stone [N20.1]    ++++++++++++++++++++++++++++++++++++++++++++++++++++++++++++++++++++++++++++++++          Date of Birth   1975    Social Security Number       Address   [address] [place] [address]    Home Phone   [phone]    MRN   [id number]       Judaism   Unknown    Marital Status                               Admission Date   24    Admission Type   Emergency    Admitting Provider   Rickey Murrieta MD    Attending Provider   Parvez Francisco DO    Department, Room/Bed   [place] [address]/       Discharge Date       Discharge Disposition       Discharge Destination                                 Attending Provider: Parvez Francisco DO    Allergies: No Known Allergies    Isolation: Spore   Infection: C.difficile (rule out) (24)   Code Status: CPR    Ht: 177.8 cm (70\")   Wt: 135 kg (297 lb 2.9 oz)    Admission Cmt: None   Principal Problem: GI bleed [K92.2]                   Active Insurance as of 2024       Primary Coverage       Payor Plan Insurance Group Employer/Plan Group    [facility] PLAN [facility] PLAN [facility]       Payor Plan Address Payor Plan Phone Number Payor Plan Fax Number Effective Dates "    [address]   1/20/2024 - None Entered    [facility] [id number]         Subscriber Name Subscriber Birth Date Member ID       BARB CHU 1975 [id number]                    Gastrointestinal Bleeding, [facility] Inpatient [facility]       Indications Met   Last updated by Ketty Gallo, RN on 5/25/2024 1147     Review Status Created By   Primary Completed Ketty Gallo RN      Criteria Review   Gastrointestinal Bleeding, [facility] Inpatient Care     Overall Determination: Indications Met     Criteria:  [×] Admission is indicated for  1 or more  of the following :      [×] Ongoing active bleeding (eg, decreasing hematocrit)          5/25/2024 11:47 AM              -- 5/25/2024 11:47 AM by Ketty Gallo, JOSELUIS --                  H/H                   1/23: 12.8/40.3                  5/24: 11.9/36.2                  5/25: 11.4/34.7.                   [facility]ac[facility]+     Notes:  -- 5/25/2024 11:47 AM by Ketty Gallo, RN --      Retro review for 5/24/24      To ED c/o dark, tarry stools X 2 days. + chills, diarrhea. & generalized fatigue/weakness. + Dizzine & Lightheaded.  Pt reports frequent NSAID use at least 5x week.  Did have encounter of black tarry stool in ED. Denies any abd pain. No flank pain.       Has been on ozempic. Last dose was last week.                   PMhx: GERD,  DM, brain aneurysm w/stent. obesity, Kidney stones, SOA. No hx of GIB.       On ASA & Plavix.                   ED results:       H/H      5/25: 11.4/34.7.       5/24: 11.9/36.2      1/23: 12.8/40.3                  BUN 25, Creatinine 1.89; GFR 43.0.       K+ 3.0.       WBC 14.0.       UA: OK. NO blood.                   CT abd: L hydroureteronephrosis w/8mm obstructing stone.                   IN ED: IV NS 1000ml bolus; Protonix IV drip;                   Admit:       Urology consult. GI consult.       I&Os.       NPO except ice.       IV NS @100/hr.       KCL PO x1.       Morphine IV prn.       Zofran IV prn.       Protonix IV bid.                    Plan for EGD & Colonoscopy.                   History & Physical        Rickey Murrieta MD at 24 0302           [facility]   HISTORY AND PHYSICAL    Patient Name: Robert Valdez  : 1975  MRN: [id number]  Primary Care Physician:  Manan Gipson APRN  Date of admission: 2024    Subjective  Subjective     Chief Complaint: Melena    HPI:    Robert Valdez is a 49 y.o. male with past medical history of hypertension, prediabetes, morbid obesity, and GERD presented to the ED with complaints of dark stools.  Patient states that for the last 2 days he has been having bowel movements with dark stools along with chills, diarrhea, and generalized weakness.  Patient admits to relatively heavy NSAID use taking ibuprofen 800 mg at least 5 times a week.  Patient was seen at this hospital 3 weeks ago with flank pain and was found to have renal stones.  Due to concerns he came to the ED for further evaluation.  In the ED patient's vitals were all within normal limits.  Labs showed that he had leukocytosis with anemia, reduced renal function, and positive fecal occult blood.  Remaining labs were relatively unremarkable including a normal INR and lactic acid.  CT abdomen showed left-sided hydronephrosis with obstructing 8 mm stone in the left ureter.  Patient denied having any significant flank pain as well as fevers, headaches, focal weakness, chest pain, palpitation, shortness of breath, cough, abdominal pain, nausea, vomiting, diarrhea, constipation, dysuria, hematuria, or anxiety.  Patient admitted for further evaluation and treatment    Review of Systems   All systems were reviewed and negative except for: As per HPI    Personal History     Past Medical History:   Diagnosis Date    Diabetes mellitus     Hypertension        History reviewed. No pertinent surgical history.    Family History: family history is not on file. Otherwise pertinent FHx was reviewed and not pertinent to  current issue.    Social History:  reports that he has never smoked. He has never used smokeless tobacco. He reports that he does not currently use alcohol. He reports that he does not use drugs.    Home Medications:  Semaglutide(0.25 or 0.5MG/DOS), amLODIPine, aspirin, cloNIDine, clopidogrel, hydrALAZINE, losartan-hydrochlorothiazide, metoprolol succinate XL, pantoprazole, polyethylene glycol, and vitamin D      Allergies:  No Known Allergies    Objective  Objective     Vitals:   Temp:  [98.3 °F (36.8 °C)-99.1 °F (37.3 °C)] 99.1 °F (37.3 °C)  Heart Rate:  [76-97] 83  Resp:  [18-20] 18  BP: ()/(59-96) 124/76  Physical Exam    Constitutional: Awake, alert, obese   Eyes: PERRLA, sclerae anicteric, no conjunctival injection   HENT: NCAT, mucous membranes moist   Neck: Supple, no thyromegaly, no lymphadenopathy, trachea midline   Respiratory: Clear to auscultation bilaterally, nonlabored respirations    Cardiovascular: RRR, no murmurs, rubs, or gallops, palpable pedal pulses bilaterally   Gastrointestinal: Positive bowel sounds, soft, nontender, nondistended   Musculoskeletal: No bilateral ankle edema, no clubbing or cyanosis to extremities   Psychiatric: Appropriate affect, cooperative   Neurologic: Oriented x 3, strength symmetric in all extremities, Cranial Nerves grossly intact to confrontation, speech clear   Skin: No rashes     Result Review   Result Review:  I have personally reviewed the results from the time of this admission to 5/25/2024 03:02 EDT and agree with these findings:  [x]  Laboratory list / accordion  []  Microbiology  [x]  Radiology  [x]  EKG/Telemetry   []  Cardiology/Vascular   []  Pathology  []  Old records  []  Other:  Most notable findings include: Leukocytosis, reduced renal function, positive fecal occult blood, normal INR, normal lactic acid, CT abdomen with obstructing 8 mm stone      Assessment & Plan  Assessment / Plan     Brief Patient Summary:  Robert Valdez is a 49 y.o.  male with past medical history of hypertension, prediabetes, morbid obesity, and GERD presented to the ED with complaints of dark stools    Active Hospital Problems:  Active Hospital Problems    Diagnosis     **GI bleed     Left ureteral stone     HTN (hypertension)      Plan:     GI bleed  -Admit to Medical floor  -Hemoglobin below baseline, transfuse as needed  -INR within normal limits  -Patient admits to NSAID use. Ibuprofen 800 mg at least 5 times weekly  -Gastroenterology consulted  -Patient to have EGD and colonoscopy tomorrow  -Will prep with GoLytely  -IV Protonix  -Follow labs  -Supportive care    Ureterolithiasis  -Admit to medical floor  -CT abdomen reviewed.  Obstructing renal stone with hydronephrosis noted  -Pain meds as needed  -Antiemetics as needed  -Urology consulted  -Supportive care    HTN  -Currently well controlled  -PRN BP meds  -Resume home meds when available  -Titrate if needed    GI ppx  DVT ppx        CODE STATUS:    Level Of Support Discussed With: Patient  Code Status (Patient has no pulse and is not breathing): CPR (Attempt to Resuscitate)  Medical Interventions (Patient has pulse or is breathing): Full Support    Admission Status:  I believe this patient meets inpatient status.      Electronically signed by Rickey Murrieta MD, 05/25/24, 3:02 AM EDT.    Electronically signed by Rikcey Murrieta MD at 05/25/24 0327          Emergency Department Notes        Eliecer Gandara MD at 05/24/24 1407          Time: 2:07 PM EDT  Date of encounter:  5/24/2024  Independent Historian/Clinical History and Information was obtained by:   Patient    History is limited by: N/A    Chief Complaint: GI bleeding, diarrhea      History of Present Illness:  Patient is a 49 y.o. year old male who presents to the emergency department for evaluation of bloody diarrhea that started last night.  Denies nausea or vomiting.    He states he has been having ongoing issues with diarrhea and alternating constipation  "ever since being started on Ozempic.    He took his last dose several days ago and has been having severe diarrhea this week and had some bloody stool last night and presents to the ED today but when he arrived to the emergency department he had a large dark or black stools that look like tar with some maroon blood present as well.    He is not on anticoagulant medication but does take Plavix and possibly aspirin.    After his bloody bowel movement here in the ED he felt lightheaded like he might pass out.    He has no previous history of diagnosed stomach ulcers but does have history of GERD.  No history of diverticulitis.    He now feels completely asymptomatic and denies any abdominal pain or nausea after this bowel movement in the ED.    HPI    Patient Care Team  Primary Care Provider: Manan Gipson APRN    Past Medical History:     No Known Allergies  History reviewed. No pertinent past medical history.  History reviewed. No pertinent surgical history.  History reviewed. No pertinent family history.    Home Medications:  Prior to Admission medications    Not on File        Social History:   Social History     Tobacco Use    Smoking status: Never    Smokeless tobacco: Never   Vaping Use    Vaping status: Never Used   Substance Use Topics    Alcohol use: Not Currently    Drug use: Never     Lives at home, family at the bedside.    Review of Systems:  Review of Systems   Gastrointestinal:  Positive for blood in stool and diarrhea. Negative for abdominal pain, nausea and vomiting.        Physical Exam:  /87 (BP Location: Right arm, Patient Position: Lying)   Pulse 93   Temp 98.6 °F (37 °C) (Oral)   Resp 20   Ht 177.8 cm (70\")   Wt 134 kg (295 lb 3.1 oz)   SpO2 97%   BMI 42.36 kg/m²       General: Awake alert and in no obvious distress    HEENT: Head normocephalic atraumatic, eyes PERRLA EOMI, nose normal, oropharynx normal.    Neck: Supple full range of motion, no meningismus, no " lymphadenopathy    Heart: Regular rate and rhythm, no murmurs or rubs, 2+ radial pulses bilaterally    Lungs: Clear to auscultation bilaterally without wheezes or crackles, no respiratory distress    Abdomen: Soft, mildly tender throughout the upper abdomen but no peritonitis, nondistended, no rebound or guarding    Skin: Warm, dry, no rash    Musculoskeletal: Normal range of motion, no lower extremity edema    Neurologic: Oriented x3, no motor deficits no sensory deficits    Psychiatric: Mood appears stable, no psychosis          Procedures:  Procedures      Medical Decision Making:      Comorbidities that affect care:    None    External Notes reviewed:    Previous Labs: I compared today's lab results to his baseline labs and it looks like he is acutely anemic and elevated white blood cell count.      The following orders were placed and all results were independently analyzed by me:  Orders Placed This Encounter   Procedures    Clostridioides difficile Toxin - Stool, Per Rectum    Clostridioides difficile Toxin, PCR - Stool, Per Rectum    CT Abdomen Pelvis With Contrast    [place] Draw    Comprehensive Metabolic Panel    Occult Blood, Fecal By Immunoassay - Stool, Per Rectum    CBC Auto Differential    Urinalysis With Microscopic If Indicated (No Culture) - Urine, Clean Catch    Urinalysis, Microscopic Only - Urine, Clean Catch    Magnesium    Lactic Acid, Plasma    Protime-INR    NPO Diet NPO Type: Strict NPO    Undress & Gown    Measure Blood Pressure    Vital Signs    Orthostatic Blood Pressure    Gastroenterology (on-call MD unless specified)    Hospitalist (on-call MD unless specified)    Urology (on-call MD unless specified)    Patient Isolation Contact Spore    Pulse Oximetry    Oxygen Therapy- Nasal Cannula; Titrate 1-6 LPM Per SpO2; 90 - 95%    Type & Screen    ABO RH Specimen Verification    Insert Peripheral IV    Insert Peripheral IV    CBC & Differential    Green Top (Gel)    Lavender Top    Gold Top  - SST    Light Blue Top       Medications Given in the Emergency Department:  Medications   sodium chloride 0.9 % flush 10 mL (has no administration in time range)   sodium chloride 0.9 % flush 10 mL (has no administration in time range)   sodium chloride 0.9 % bolus 1,000 mL (0 mL Intravenous Stopped 5/24/24 2042)   pantoprazole (PROTONIX) injection 80 mg (80 mg Intravenous Given 5/24/24 1806)   iopamidol (ISOVUE-370) 76 % injection 100 mL (100 mL Intravenous Given 5/24/24 1752)        ED Course:    ED Course as of 05/24/24 2117   Fri May 24, 2024   1408 --- PROVIDER IN TRIAGE NOTE ---    The patient was evaluated by Theo castillo in triage. Orders were placed and the patient is currently awaiting disposition.    [AJ]      ED Course User Index  [AJ] Theo Maurer PA-C       Labs:    Lab Results (last 24 hours)       Procedure Component Value Units Date/Time    CBC & Differential [[id number]]  (Abnormal) Collected: 05/24/24 1415    Specimen: Blood from Arm, Right Updated: 05/24/24 1440    Narrative:      The following orders were created for panel order CBC & Differential.  Procedure                               Abnormality         Status                     ---------                               -----------         ------                     CBC Auto Differential[[id number]]        Abnormal            Final result                 Please view results for these tests on the individual orders.    Comprehensive Metabolic Panel [[id number]]  (Abnormal) Collected: 05/24/24 1415    Specimen: Blood from Arm, Right Updated: 05/24/24 1457     Glucose 117 mg/dL      BUN 25 mg/dL      Creatinine 1.89 mg/dL      Sodium 136 mmol/L      Potassium 3.0 mmol/L      Chloride 96 mmol/L      CO2 28.7 mmol/L      Calcium 8.9 mg/dL      Total Protein 6.9 g/dL      Albumin 4.1 g/dL      ALT (SGPT) 16 U/L      AST (SGOT) 15 U/L      Alkaline Phosphatase 74 U/L      Total Bilirubin 0.6 mg/dL      Globulin 2.8 gm/dL      A/G  Ratio 1.5 g/dL      BUN/Creatinine Ratio 13.2     Anion Gap 11.3 mmol/L      eGFR 43.0 mL/min/1.73     Narrative:      GFR Normal >60  Chronic Kidney Disease <60  Kidney Failure <15      CBC Auto Differential [[id number]]  (Abnormal) Collected: 05/24/24 1415    Specimen: Blood from Arm, Right Updated: 05/24/24 1440     WBC 14.00 10*3/mm3      RBC 4.28 10*6/mm3      Hemoglobin 11.9 g/dL      Hematocrit 36.2 %      MCV 84.6 fL      MCH 27.8 pg      MCHC 32.9 g/dL      RDW 14.2 %      RDW-SD 43.8 fl      MPV 10.4 fL      Platelets 283 10*3/mm3      Neutrophil % 86.7 %      Lymphocyte % 6.3 %      Monocyte % 5.6 %      Eosinophil % 0.7 %      Basophil % 0.2 %      Immature Grans % 0.5 %      Neutrophils, Absolute 12.13 10*3/mm3      Lymphocytes, Absolute 0.88 10*3/mm3      Monocytes, Absolute 0.79 10*3/mm3      Eosinophils, Absolute 0.10 10*3/mm3      Basophils, Absolute 0.03 10*3/mm3      Immature Grans, Absolute 0.07 10*3/mm3      nRBC 0.0 /100 WBC     Urinalysis With Microscopic If Indicated (No Culture) - Urine, Clean Catch [[id number]]  (Abnormal) Collected: 05/24/24 1423    Specimen: Urine, Clean Catch Updated: 05/24/24 1448     Color, UA Yellow     Appearance, UA Clear     pH, UA 7.0     Specific Gravity, UA 1.015     Glucose, UA Negative     Ketones, UA Negative     Bilirubin, UA Negative     Blood, UA Negative     Protein, UA 30 mg/dL (1+)     Leuk Esterase, UA Negative     Nitrite, UA Negative     Urobilinogen, UA 0.2 E.U./dL    Urinalysis, Microscopic Only - Urine, Clean Catch [[id number]]  (Abnormal) Collected: 05/24/24 1423    Specimen: Urine, Clean Catch Updated: 05/24/24 1448     RBC, UA 3-5 /HPF      WBC, UA 0-2 /HPF      Bacteria, UA None Seen /HPF      Squamous Epithelial Cells, UA 0-2 /HPF      Hyaline Casts, UA 3-6 /LPF      Methodology Automated Microscopy    Occult Blood, Fecal By Immunoassay - Stool, Per Rectum [[id number]]  (Abnormal) Collected: 05/24/24 1547    Specimen: Stool from Per Rectum  Updated: 05/24/24 1603     Occult Blood, Fecal by Immunoassay Positive    Magnesium [[id number]]  (Normal) Collected: 05/24/24 1742    Specimen: Blood Updated: 05/24/24 1757     Magnesium 2.1 mg/dL              Imaging:    CT Abdomen Pelvis With Contrast    Result Date: 5/24/2024  CT ABDOMEN PELVIS W CONTRAST-  Date of Exam: 5/24/2024 5:41 PM  Indication: Abdominal pain, melena; R10.84-Generalized abdominal pain; K92.2-Gastrointestinal hemorrhage, unspecified.  Comparison: None available.  Technique: Axial CT images were obtained of the abdomen and pelvis following the uneventful intravenous administration of 100 cc Isovue-370 . Reconstructed coronal and sagittal images were also obtained. Automated exposure control and iterative construction methods were used.   Findings: Visualized lung bases are clear. Liver, pancreas, and spleen are within normal limits. Gallbladder appears normal. No evidence of biliary tract obstruction.  Bilateral adrenal glands are within normal limits. There is mild left-sided hydronephrosis with delayed nephrogram. There is an obstructing 8 mm stone within the mid left ureter. Smaller punctate nonobstructing stones are seen within the left kidney. No other ureteral stones are identified. No right-sided renal or ureteral stone. The upper GI tract is within normal limits. There is no abdominal or retroperitoneal adenopathy.  Pelvis: Urinary bladder is within normal limits. GI tract including appendix is normal. No pelvic or inguinal adenopathy. No free intraperitoneal fluid.  No lytic or sclerotic bony lesions are identified.      Impression:  1. Mild left-sided hydroureteronephrosis with delayed nephrogram secondary to an obstructing 8 mm stone within the mid left ureter. There are additional punctate nonobstructing left renal stones.    Electronically Signed By-Alec Lawrence MD On:5/24/2024 6:20 PM         Differential Diagnosis and Discussion:    GI Bleeding: Differential diagnosis  includes but is not limited to gastritis, gastric ulcer, stress ulcer, duodenitis, Susannah-Morejon tears, esophageal varices, angiodysplasia, aortic enteric fistula, hematologic issues including thrombocytopenia, GI neoplasm, ulcerative colitis, Crohn's disease, diverticulosis, diverticulitis, hemorrhoids, aortic aneurysm, and polyps    All labs were reviewed and interpreted by me.  CT scan radiology impression was interpreted by me.    MDM     Amount and/or Complexity of Data Reviewed  Clinical lab tests: reviewed  Tests in the radiology section of CPT®: reviewed  Decide to obtain previous medical records or to obtain history from someone other than the patient: yes           This patient is a 49-year-old male presenting with severe diarrhea since the last few days and now with black tarry stools and some GI bleeding today.    Lab work shows a low hemoglobin of 11.9 which is down from his baseline of 14.    I am hydrating with IV fluids and giving him a dose of IV Protonix.    He does not have any known history of bleeding ulcers or liver disease.    I am getting a CT of the abdomen pelvis to evaluate further as he had some abdominal pain.    I will consult with gastroenterology.    Patient has now had a second bloody bowel movement in the ED.    I have spoken with our gastroenterologist on-call who recommends n.p.o. after midnight with plan for bowel prep and we will plan to scope him both EGD and colonoscopy tomorrow around noon time.    In addition, incidental finding of obstructing large left ureteral stone with hydronephrosis seen on CT imaging and I consulted with Dr. Bardales of urology who will see him in the hospital.                Patient Care Considerations:          Consultants/Shared Management Plan:    Hospitalist: I have discussed the case with admitting hospitalist who agrees to accept the patient for admission.  Consultant: I have discussed the case with Dr. Bardales of urology and Dr. Zaragoza of  gastroenterology, who agrees to consult on the patient.    Social Determinants of Health:    Patient is independent, reliable, and has access to care.       Disposition and Care Coordination:    Admit:   Through independent evaluation of the patient's history, physical, and imperical data, the patient meets criteria for inpatient admission to the hospital.        Final diagnoses:   Acute generalized abdominal pain   Acute upper GI bleed   Left ureteral stone        ED Disposition       ED Disposition   Intended Admit    Condition   --    Comment   --               This medical record created using voice recognition software.             Eliecer Gandara MD  05/24/24 2117      Electronically signed by Eliecer Gandara MD at 05/24/24 2117       Current Facility-Administered Medications   Medication Dose Route Frequency Provider Last Rate Last Admin    amLODIPine (NORVASC) tablet 10 mg  10 mg Oral Daily Rickey Murrieta MD   10 mg at 05/25/24 0946    [Held by provider] aspirin EC tablet 325 mg  325 mg Oral Daily Rickey Murrieta MD        cloNIDine (CATAPRES) tablet 0.1 mg  0.1 mg Oral BID Rickey Murrieta MD   0.1 mg at 05/25/24 0946    [Held by provider] clopidogrel (PLAVIX) tablet 75 mg  75 mg Oral Daily Rickey Murrieta MD        erythromycin (ERYTHROCIN) 125 mg in sodium chloride 0.9 % 100 mL IVPB  125 mg Intravenous Once Axel Zaragoza  mL/hr at 05/25/24 1059 125 mg at 05/25/24 1059    losartan (COZAAR) tablet 100 mg  100 mg Oral Q24H Rickey Murrieta MD   100 mg at 05/25/24 0946    And    hydroCHLOROthiazide tablet 25 mg  25 mg Oral Q24H Rickey Murrieta MD   25 mg at 05/25/24 0946    metoprolol succinate XL (TOPROL-XL) 24 hr tablet 50 mg  50 mg Oral Q PM Rickey Murrieta MD        morphine injection 1 mg  1 mg Intravenous Q4H PRN Rickey Murrieta MD        And    naloxone (NARCAN) injection 0.4 mg  0.4 mg Intravenous Q5 Min PRN Rickey Murrieta MD        ondansetron (ZOFRAN) injection 4 mg  4 mg  Intravenous Q6H PRN Rickey Murrieta MD        pantoprazole (PROTONIX) injection 40 mg  40 mg Intravenous BID AC Rickey Murrieta MD        polyethylene glycol (GoLYTELY) solution 2,000 mL  2,000 mL Oral Q6H Rickey Murrieta MD   2,000 mL at 05/25/24 0103    sodium chloride 0.9 % flush 10 mL  10 mL Intravenous PRN Eliecer Gandara MD   10 mL at 05/25/24 0106    sodium chloride 0.9 % flush 10 mL  10 mL Intravenous PRN Eliecer Gandara MD        sodium chloride 0.9 % flush 10 mL  10 mL Intravenous Q12H Rickey Murrieta MD   10 mL at 05/25/24 0946    sodium chloride 0.9 % flush 10 mL  10 mL Intravenous PRN Rickey Murrieta MD        sodium chloride 0.9 % infusion 40 mL  40 mL Intravenous PRN Rickey Murrieta MD        sodium chloride 0.9 % infusion  100 mL/hr Intravenous Continuous Rickey Murrieta  mL/hr at 05/25/24 1100 100 mL/hr at 05/25/24 1100    tamsulosin (FLOMAX) 24 hr capsule 0.4 mg  0.4 mg Oral Daily Rickey Murrieta MD   0.4 mg at 05/25/24 0121     Lab Results (last 24 hours)       Procedure Component Value Units Date/Time    C-reactive Protein [[id number]]  (Abnormal) Collected: 05/25/24 0603    Specimen: Blood from Arm, Right Updated: 05/25/24 1119     C-Reactive Protein 2.90 mg/dL     Sedimentation Rate [[id number]]  (Abnormal) Collected: 05/25/24 0603    Specimen: Blood from Arm, Right Updated: 05/25/24 1113     Sed Rate 27 mm/hr     Clostridioides difficile Toxin - Stool, Per Rectum [[id number]] Collected: 05/25/24 0545    Specimen: Stool from Per Rectum Updated: 05/25/24 0816    Narrative:      The following orders were created for panel order Clostridioides difficile Toxin - Stool, Per Rectum.  Procedure                               Abnormality         Status                     ---------                               -----------         ------                     Clostridioides difficile...[[id number]]                      Final result                 Please view results for these tests on the  individual orders.    Clostridioides difficile Toxin, PCR - Stool, Per Rectum [[id number]] Collected: 05/25/24 0545    Specimen: Stool from Per Rectum Updated: 05/25/24 0816     Toxigenic C. difficile by PCR Negative     027 Toxin Presumptive Negative    Narrative:      The result indicates the absence of toxigenic C. difficile from stool specimen.     Fecal Lactoferrin Qual. - Stool, Per Rectum [[id number]]  (Abnormal) Collected: 05/25/24 0545    Specimen: Stool from Per Rectum Updated: 05/25/24 0756     Lactoferrin, Qual Positive    Basic Metabolic Panel [[id number]]  (Abnormal) Collected: 05/25/24 0603    Specimen: Blood from Arm, Right Updated: 05/25/24 0713     Glucose 146 mg/dL      BUN 20 mg/dL      Creatinine 1.70 mg/dL      Sodium 138 mmol/L      Potassium 3.4 mmol/L      Chloride 101 mmol/L      CO2 28.3 mmol/L      Calcium 8.5 mg/dL      BUN/Creatinine Ratio 11.8     Anion Gap 8.7 mmol/L      eGFR 48.8 mL/min/1.73     Narrative:      GFR Normal >60  Chronic Kidney Disease <60  Kidney Failure <15      CBC (No Diff) [[id number]]  (Abnormal) Collected: 05/25/24 0603    Specimen: Blood from Arm, Right Updated: 05/25/24 0642     WBC 9.46 10*3/mm3      RBC 4.09 10*6/mm3      Hemoglobin 11.4 g/dL      Hematocrit 34.7 %      MCV 84.8 fL      MCH 27.9 pg      MCHC 32.9 g/dL      RDW 14.1 %      RDW-SD 43.8 fl      MPV 10.5 fL      Platelets 239 10*3/mm3     Enteric Bacterial Panel - Stool, Per Rectum [[id number]] Collected: 05/25/24 0545    Specimen: Stool from Per Rectum Updated: 05/25/24 0548    Enteric Parasite Panel - Stool, Per Rectum [[id number]] Collected: 05/25/24 0545    Specimen: Stool from Per Rectum Updated: 05/25/24 0548    Lactic Acid, Plasma [[id number]]  (Normal) Collected: 05/24/24 2132    Specimen: Blood from Arm, Right Updated: 05/24/24 2153     Lactate 1.1 mmol/L     Protime-INR [[id number]]  (Normal) Collected: 05/24/24 1415    Specimen: Blood from Arm, Right Updated: 05/24/24 2125     Protime 12.9  Seconds      INR 0.96    Narrative:      Suggested Therapeutic Ranges For Oral Anticoagulant Therapy:  Level of Therapy                      INR Target Range  Standard Dose                            2.0-3.0  High Dose                                2.5-3.5  Patients not receiving anticoagulant  Therapy Normal Range                     0.86-1.15    Magnesium [[id number]]  (Normal) Collected: 05/24/24 1742    Specimen: Blood Updated: 05/24/24 1757     Magnesium 2.1 mg/dL     Occult Blood, Fecal By Immunoassay - Stool, Per Rectum [[id number]]  (Abnormal) Collected: 05/24/24 1547    Specimen: Stool from Per Rectum Updated: 05/24/24 1603     Occult Blood, Fecal by Immunoassay Positive    Comprehensive Metabolic Panel [[id number]]  (Abnormal) Collected: 05/24/24 1415    Specimen: Blood from Arm, Right Updated: 05/24/24 1457     Glucose 117 mg/dL      BUN 25 mg/dL      Creatinine 1.89 mg/dL      Sodium 136 mmol/L      Potassium 3.0 mmol/L      Chloride 96 mmol/L      CO2 28.7 mmol/L      Calcium 8.9 mg/dL      Total Protein 6.9 g/dL      Albumin 4.1 g/dL      ALT (SGPT) 16 U/L      AST (SGOT) 15 U/L      Alkaline Phosphatase 74 U/L      Total Bilirubin 0.6 mg/dL      Globulin 2.8 gm/dL      A/G Ratio 1.5 g/dL      BUN/Creatinine Ratio 13.2     Anion Gap 11.3 mmol/L      eGFR 43.0 mL/min/1.73     Narrative:      GFR Normal >60  Chronic Kidney Disease <60  Kidney Failure <15      Urinalysis With Microscopic If Indicated (No Culture) - Urine, Clean Catch [[id number]]  (Abnormal) Collected: 05/24/24 1423    Specimen: Urine, Clean Catch Updated: 05/24/24 1448     Color, UA Yellow     Appearance, UA Clear     pH, UA 7.0     Specific Gravity, UA 1.015     Glucose, UA Negative     Ketones, UA Negative     Bilirubin, UA Negative     Blood, UA Negative     Protein, UA 30 mg/dL (1+)     Leuk Esterase, UA Negative     Nitrite, UA Negative     Urobilinogen, UA 0.2 E.U./dL    Urinalysis, Microscopic Only - Urine, Clean Catch [[id number]]   (Abnormal) Collected: 05/24/24 1423    Specimen: Urine, Clean Catch Updated: 05/24/24 1448     RBC, UA 3-5 /HPF      WBC, UA 0-2 /HPF      Bacteria, UA None Seen /HPF      Squamous Epithelial Cells, UA 0-2 /HPF      Hyaline Casts, UA 3-6 /LPF      Methodology Automated Microscopy    [place] Draw [[id number]] Collected: 05/24/24 1415    Specimen: Blood from Arm, Right Updated: 05/24/24 1446    Narrative:      The following orders were created for panel order [place] Draw.  Procedure                               Abnormality         Status                     ---------                               -----------         ------                     Green Top (Gel)[[id number]]                                  Final result               Lavender Top[[id number]]                                     Final result               Gold Top - SST[[id number]]                                   Final result               Light Blue Top[[id number]]                                   Final result                 Please view results for these tests on the individual orders.    Green Top (Gel) [[id number]] Collected: 05/24/24 1415    Specimen: Blood from Arm, Right Updated: 05/24/24 1446     Extra Tube Hold for add-ons.     Comment: Auto resulted.       Lavender Top [[id number]] Collected: 05/24/24 1415    Specimen: Blood from Arm, Right Updated: 05/24/24 1446     Extra Tube hold for add-on     Comment: Auto resulted       Gold Top - SST [[id number]] Collected: 05/24/24 1415    Specimen: Blood from Arm, Right Updated: 05/24/24 1446     Extra Tube Hold for add-ons.     Comment: Auto resulted.       Light Blue Top [[id number]] Collected: 05/24/24 1415    Specimen: Blood from Arm, Right Updated: 05/24/24 1446     Extra Tube Hold for add-ons.     Comment: Auto resulted       CBC & Differential [[id number]]  (Abnormal) Collected: 05/24/24 1415    Specimen: Blood from Arm, Right Updated: 05/24/24 1440    Narrative:      The following orders were created  for panel order CBC & Differential.  Procedure                               Abnormality         Status                     ---------                               -----------         ------                     CBC Auto Differential[[id number]]        Abnormal            Final result                 Please view results for these tests on the individual orders.    CBC Auto Differential [[id number]]  (Abnormal) Collected: 05/24/24 1415    Specimen: Blood from Arm, Right Updated: 05/24/24 1440     WBC 14.00 10*3/mm3      RBC 4.28 10*6/mm3      Hemoglobin 11.9 g/dL      Hematocrit 36.2 %      MCV 84.6 fL      MCH 27.8 pg      MCHC 32.9 g/dL      RDW 14.2 %      RDW-SD 43.8 fl      MPV 10.4 fL      Platelets 283 10*3/mm3      Neutrophil % 86.7 %      Lymphocyte % 6.3 %      Monocyte % 5.6 %      Eosinophil % 0.7 %      Basophil % 0.2 %      Immature Grans % 0.5 %      Neutrophils, Absolute 12.13 10*3/mm3      Lymphocytes, Absolute 0.88 10*3/mm3      Monocytes, Absolute 0.79 10*3/mm3      Eosinophils, Absolute 0.10 10*3/mm3      Basophils, Absolute 0.03 10*3/mm3      Immature Grans, Absolute 0.07 10*3/mm3      nRBC 0.0 /100 WBC           Imaging Results (Last 24 Hours)       Procedure Component Value Units Date/Time    CT Abdomen Pelvis With Contrast [[id number]] Collected: 05/24/24 1816     Updated: 05/24/24 1822    Narrative:      CT ABDOMEN PELVIS W CONTRAST-     Date of Exam: 5/24/2024 5:41 PM     Indication: Abdominal pain, melena; R10.84-Generalized abdominal pain;  K92.2-Gastrointestinal hemorrhage, unspecified.     Comparison: None available.     Technique: Axial CT images were obtained of the abdomen and pelvis  following the uneventful intravenous administration of 100 cc Isovue-370  . Reconstructed coronal and sagittal images were also obtained.  Automated exposure control and iterative construction methods were used.        Findings:  Visualized lung bases are clear. Liver, pancreas, and spleen are within  normal  limits. Gallbladder appears normal. No evidence of biliary tract  obstruction.     Bilateral adrenal glands are within normal limits. There is mild  left-sided hydronephrosis with delayed nephrogram. There is an  obstructing 8 mm stone within the mid left ureter. Smaller punctate  nonobstructing stones are seen within the left kidney. No other ureteral  stones are identified. No right-sided renal or ureteral stone. The upper  GI tract is within normal limits. There is no abdominal or  retroperitoneal adenopathy.     Pelvis: Urinary bladder is within normal limits. GI tract including  appendix is normal. No pelvic or inguinal adenopathy. No free  intraperitoneal fluid.     No lytic or sclerotic bony lesions are identified.       Impression:      Impression:     1. Mild left-sided hydroureteronephrosis with delayed nephrogram  secondary to an obstructing 8 mm stone within the mid left ureter. There  are additional punctate nonobstructing left renal stones.           Electronically Signed By-Alec Lawrence MD On:5/24/2024 6:20 PM             Orders (last 24 hrs)        Start     Ordered    05/26/24 1737  Auto Discontinue in 48 Hours if not Collected  ONCE CDIFF         05/24/24 1736    05/25/24 1730  pantoprazole (PROTONIX) injection 40 mg  2 Times Daily Before Meals         05/25/24 0322    05/25/24 1700  metoprolol succinate XL (TOPROL-XL) 24 hr tablet 50 mg  Every Evening         05/25/24 0326    05/25/24 1102  C-reactive Protein  Once         05/25/24 1102    05/25/24 1102  Sedimentation Rate  Once         05/25/24 1102    05/25/24 1100  erythromycin (ERYTHROCIN) 125 mg in sodium chloride 0.9 % 100 mL IVPB  Once         05/25/24 1006    05/25/24 0900  amLODIPine (NORVASC) tablet 10 mg  Daily         05/25/24 0326    05/25/24 0900  cloNIDine (CATAPRES) tablet 0.1 mg  2 Times Daily         05/25/24 0326    05/25/24 0900  [Held by provider]  clopidogrel (PLAVIX) tablet 75 mg  Daily        (On hold since today at 0326  "until manually unheld; held by Rickey Murrieta MDHold Reason: Other (Comment Required))    05/25/24 0326 05/25/24 0900  [Held by provider]  aspirin EC tablet 325 mg  Daily        (On hold since today at 0326 until manually unheld; held by Rickey Murrieta MDHold Reason: Other (Comment Required))    05/25/24 0326 05/25/24 0900  losartan (COZAAR) tablet 100 mg  Every 24 Hours Scheduled        Placed in \"And\" Linked Group    05/25/24 0326 05/25/24 0900  hydroCHLOROthiazide tablet 25 mg  Every 24 Hours Scheduled        Placed in \"And\" Linked Group    05/25/24 0326 05/25/24 0800  Oral Care  2 Times Daily       05/24/24 2209 05/25/24 0730  pantoprazole (PROTONIX) injection 40 mg  2 Times Daily Before Meals,   Status:  Discontinued         05/25/24 0322 05/25/24 0600  Basic Metabolic Panel  Daily       05/24/24 2209 05/25/24 0600  CBC (No Diff)  Daily       05/24/24 2209 05/25/24 0235  Inpatient Consult to Advance Care Planning  Once        Provider:  (Not yet assigned)    05/25/24 0234    05/25/24 0030  polyethylene glycol (GoLYTELY) solution 2,000 mL  Every 6 Hours         05/24/24 2209 05/25/24 0030  bisacodyl (DULCOLAX) EC tablet 20 mg  Once        Note to Pharmacy: With first glass of golytely    05/24/24 2230 05/25/24 0000  Vital Signs  Every 4 Hours       05/24/24 2209 05/24/24 2233  Fecal Lactoferrin Qual. - Stool, Per Rectum  Once         05/24/24 2232 05/24/24 2232  Enteric Bacterial Panel - Stool, Per Rectum  Once         05/24/24 2232 05/24/24 2232  Enteric Parasite Panel - Stool, Per Rectum  Once         05/24/24 2232 05/24/24 2230  tamsulosin (FLOMAX) 24 hr capsule 0.4 mg  Daily         05/24/24 2209 05/24/24 2230  sodium chloride 0.9 % flush 10 mL  Every 12 Hours Scheduled         05/24/24 2209 05/24/24 2230  potassium chloride (MICRO-K/KLOR-CON) CR capsule  Once         05/24/24 2209 05/24/24 1040  sodium chloride 0.9 % infusion  Continuous         " "05/24/24 2209 05/24/24 2205  Bowel Regimen Not Indicated  Once         05/24/24 2209 05/24/24 2204  ondansetron (ZOFRAN) injection 4 mg  Every 6 Hours PRN         05/24/24 2209 05/24/24 2204  morphine injection 1 mg  Every 4 Hours PRN        Placed in \"And\" Linked Group    05/24/24 2209 05/24/24 2204  naloxone (NARCAN) injection 0.4 mg  Every 5 Minutes PRN        Placed in \"And\" Linked Group    05/24/24 2209 05/24/24 2204  NPO Diet NPO Type: Sips with Meds, Ice Chips  Diet Effective Now         05/24/24 2209 05/24/24 2203  Intake & Output  Every Shift       05/24/24 2209 05/24/24 2203  Weigh Patient  Once         05/24/24 2209 05/24/24 2203  Insert Peripheral IV  Once         05/24/24 2209 05/24/24 2203  Saline Lock & Maintain IV Access  Continuous         05/24/24 2209 05/24/24 2203  Inpatient Admission  Once         05/24/24 2209 05/24/24 2203  Code Status and Medical Interventions:  Continuous         05/24/24 2209 05/24/24 2203  Place Sequential Compression Device  Once         05/24/24 2209 05/24/24 2203  Maintain Sequential Compression Device  Continuous         05/24/24 2209 05/24/24 2203  Activity - Ad [place]  Until Discontinued         05/24/24 2209 05/24/24 2202  sodium chloride 0.9 % flush 10 mL  As Needed         05/24/24 2209 05/24/24 2202  sodium chloride 0.9 % infusion 40 mL  As Needed         05/24/24 2209 05/24/24 2108  Hospitalist (on-call MD unless specified)  Once        Specialty:  Hospitalist  Provider:  Rickey Murrieta MD    05/24/24 2107 05/24/24 2108  Urology (on-call MD unless specified)  Once        Specialty:  Urology  Provider:  Omid Bardales MD    05/24/24 2107 05/24/24 2107  Lactic Acid, Plasma  STAT         05/24/24 2106 05/24/24 2107  Protime-INR  STAT         05/24/24 2107 05/24/24 2101  Gastroenterology (on-call MD unless specified)  Once        Specialty:  Gastroenterology  Provider:  Axel Zaragoza MD    " "05/24/24 2100    05/24/24 1815  iopamidol (ISOVUE-370) 76 % injection 100 mL  Once in Imaging         05/24/24 1752    05/24/24 1800  pantoprazole (PROTONIX) injection 80 mg  Once         05/24/24 1731    05/24/24 1745  sodium chloride 0.9 % bolus 1,000 mL  Once         05/24/24 1717    05/24/24 1737  Clostridioides difficile Toxin - Stool, Per Rectum  Once         05/24/24 1736    05/24/24 1737  Patient Isolation Contact Spore  Continuous        Comments: Isolation Precautions Per Clostridium Difficile (CDI) Infection Control Policy / Process    05/24/24 1736    05/24/24 1737  Clostridioides difficile Toxin, PCR - Stool, Per Rectum  PROCEDURE ONCE         05/24/24 1736    05/24/24 1728  CT Abdomen Pelvis With Contrast  1 Time Imaging         05/24/24 1727    05/24/24 1718  Insert Peripheral IV  Once        Placed in \"And\" Linked Group    05/24/24 1717    05/24/24 1718  Magnesium  Once         05/24/24 1717    05/24/24 1717  sodium chloride 0.9 % flush 10 mL  As Needed        Placed in \"And\" Linked Group    05/24/24 1717    05/24/24 1500  ABO RH Specimen Verification  STAT         05/24/24 1459    05/24/24 1442  Urinalysis, Microscopic Only - Urine, Clean Catch  Once         05/24/24 1441    05/24/24 1421  Urinalysis With Microscopic If Indicated (No Culture) - Urine, Clean Catch  Once         05/24/24 1420    05/24/24 1410  NPO Diet NPO Type: Strict NPO  Diet Effective Now,   Status:  Canceled         05/24/24 1409    05/24/24 1410  Undress & Gown  Once         05/24/24 1409    05/24/24 1410  Cardiac Monitoring  Continuous        Comments: Follow Standing Orders As Outlined in Process Instructions (Open Order Report to View Full Instructions)    05/24/24 1409    05/24/24 1410  Pulse Oximetry  Per Hospital Policy         05/24/24 1409    05/24/24 1410  Measure Blood Pressure  Per Hospital Policy         05/24/24 1409    05/24/24 1410  Vital Signs  Per Hospital Policy         05/24/24 1409    05/24/24 1410  " Orthostatic Blood Pressure  Once         05/24/24 1409    05/24/24 1410  Insert Peripheral IV  Once         05/24/24 1409    05/24/24 1410  [place] Draw  Once         05/24/24 1409    05/24/24 1410  CBC & Differential  Once         05/24/24 1409    05/24/24 1410  Comprehensive Metabolic Panel  Once         05/24/24 1409    05/24/24 1410  Type & Screen  Once         05/24/24 1409    05/24/24 1410  Occult Blood, Fecal By Immunoassay - Stool, Per Rectum  Once         05/24/24 1409    05/24/24 1410  Green Top (Gel)  PROCEDURE ONCE         05/24/24 1409    05/24/24 1410  Lavender Top  PROCEDURE ONCE         05/24/24 1409    05/24/24 1410  Gold Top - SST  PROCEDURE ONCE         05/24/24 1409    05/24/24 1410  Light Blue Top  PROCEDURE ONCE         05/24/24 1409    05/24/24 1410  CBC Auto Differential  PROCEDURE ONCE         05/24/24 1409    05/24/24 1409  sodium chloride 0.9 % flush 10 mL  As Needed         05/24/24 1409    05/04/24 0000  PEG 3350 17 GM/SCOOP powder  Daily         05/24/24 2219 03/05/24 0000  amLODIPine (NORVASC) 10 MG tablet  Daily         05/24/24 2219 03/05/24 0000  aspirin 325 MG EC tablet  Daily         05/24/24 2219 03/05/24 0000  cloNIDine (CATAPRES) 0.1 MG tablet  2 Times Daily         05/24/24 2219 02/19/24 0000  Ozempic, 0.25 or 0.5 MG/DOSE, 2 MG/3ML solution pen-injector  Weekly         05/24/24 2219 02/13/24 0000  pantoprazole (PROTONIX) 40 MG EC tablet  Daily         05/24/24 2219 02/12/24 0000  vitamin D (ERGOCALCIFEROL) 1.25 MG (49757 UT) capsule capsule  Weekly         05/24/24 2219 01/29/24 0000  losartan-hydrochlorothiazide (HYZAAR) 100-25 MG per tablet  Daily         05/24/24 2219 01/24/24 0000  clopidogrel (PLAVIX) 75 MG tablet  Daily         05/24/24 2219    Unscheduled  Oxygen Therapy- Nasal Cannula; Titrate 1-6 LPM Per SpO2; 90 - 95%  Continuous PRN       05/24/24 1409    --  hydrALAZINE (APRESOLINE) 25 MG tablet  2 Times Daily         05/24/24 6445    --   metoprolol succinate XL (TOPROL-XL) 50 MG 24 hr tablet  Every Evening         05/24/24 2219    --  tamsulosin (FLOMAX) 0.4 MG capsule 24 hr capsule  Daily,   Status:  Discontinued         05/24/24 2219

## 2024-05-25 NOTE — ANESTHESIA POSTPROCEDURE EVALUATION
Patient: Robert Valdez    Procedure Summary       Date: 05/25/24 Room / Location: [facility] OR 06 / [facility] MAIN OR    Anesthesia Start: 1407 Anesthesia Stop: 1538    Procedures:       ESOPHAGOGASTRODUODENOSCOPY      COLONOSCOPY WITH BIOPSIES AND CLIP APPLICATION X 2 Diagnosis:       Blood in stool      Acute diarrhea      Pre-syncope      NSAIDs adverse reaction, initial encounter      (Blood in stool [K92.1])      (Acute diarrhea [R19.7])      (Pre-syncope [R55])      (NSAIDs adverse reaction, initial encounter [T39.395A])    Surgeons: Axel Zaragoza MD Provider: Humberto Manzano MD    Anesthesia Type: general ASA Status: 3 - Emergent            Anesthesia Type: general    Vitals  Vitals Value Taken Time   /80 05/25/24 1550   Temp 36.4 °C (97.5 °F) 05/25/24 1535   Pulse 92 05/25/24 1553   Resp 20 05/25/24 1550   SpO2 96 % 05/25/24 1553   Vitals shown include unfiled device data.        Post Anesthesia Care and Evaluation    Patient location during evaluation: bedside  Patient participation: complete - patient participated  Level of consciousness: awake  Pain management: adequate    Airway patency: patent  PONV Status: none  Cardiovascular status: acceptable and stable  Respiratory status: acceptable  Hydration status: acceptable

## 2024-05-25 NOTE — ANESTHESIA PREPROCEDURE EVALUATION
Anesthesia Evaluation     Patient summary reviewed and Nursing notes reviewed   no history of anesthetic complications:   NPO Solid Status: > 8 hours  NPO Liquid Status: > 2 hours           Airway   Mallampati: II  TM distance: >3 FB  Neck ROM: full  No difficulty expected  Dental      Pulmonary - negative pulmonary ROS and normal exam    breath sounds clear to auscultation  Cardiovascular - normal exam  Exercise tolerance: good (4-7 METS)    PT is on anticoagulation therapy  Rhythm: regular  Rate: normal    (+) hypertension    ROS comment: Last plavix 5/24    Neuro/Psych- negative ROS    ROS Comment: Aneurysm coiled Jan 2024.  On plavix  GI/Hepatic/Renal/Endo    (+) GI bleeding active bleeding, diabetes mellitus    ROS Comment: Ozempic this week    Musculoskeletal     Abdominal    Substance History      OB/GYN          Other        ROS/Med Hx Other: PAT Nursing Notes unavailable.               Anesthesia Plan    ASA 3 - emergent     general     (Patient understands anesthesia not responsible for dental damage.    Prep complete noon  )  intravenous induction     Anesthetic plan, risks, benefits, and alternatives have been provided, discussed and informed consent has been obtained with: patient and spouse/significant other.    Plan discussed with CRNA.    CODE STATUS:    Level Of Support Discussed With: Patient  Code Status (Patient has no pulse and is not breathing): CPR (Attempt to Resuscitate)  Medical Interventions (Patient has pulse or is breathing): Full Support

## 2024-05-25 NOTE — CONSULTS
[facility]  Initial Inpatient Consult Note    Referring Provider: Parvez Francisco DO    Reason for Consultation:  Bloody diarrhea with pre-syncope    History of present illness: Patient is 49 y.o. male with known medical history of hypertension, diabetes mellitus, GERD, morbid obesity, brain aneurysm s/p stent and coiling earlier this year and since then has been on aspirin and Plavix admitted overnight with 2 days history of watery diarrhea with abdominal cramps and bloody diarrhea that has been started earlier last evening with presyncope.    Patient describes that diarrhea was just started on Thursday and he had 5-6 nonbloody, watery BM.  Yesterday, he went up to 10 times.  It was associated with crampy abdominal pain.  No nausea or vomiting, fever or chills.  Yesterday afternoon, while he was at his work, he had bloody bowel movement after which he felt lightheaded, dizzy, cold and cold and clammy and a feeling of going to pass out.  At that time he decided to present in the ED.  In the ED he had black tarry type BM with some maroonish color blood present.  He again felt lightheaded with feeling of going to pass out.  Soon his symptoms resolved and since then he is doing okay.  Overnight, he has BM with some blood present.  Patient ate grilled chicken, potato and carrots on Wednesday.  No sick contact.  Recent travel to [place]    Patient takes Plavix and aspirin and last dose was Friday morning.  He is taking ibuprofen 800 mg tablet daily for 5 days a week for last 3 weeks.  He is on Protonix 40 mg orally daily. He is on Ozempic and last dose was on Tuesday.  Patient is currently on colon prep and took one third of it.  Patient is still drinking.    Lab workup showed drop in hemoglobin from 12.8 g/dL up to 11.4 g/dL.  Initial leukocytosis resolved.  BUN 25 and creatinine 1.7 with baseline creatinine of 0.92.  LFTs within normal range.  Lactic acid normal, FOBT positive, fecal lactoferrin  "positive.  C. difficile PCR negative    CT abdomen and pelvis reported \" mild left-sided hydronephrosis with 8 mm obstructing stone in the mid left ureter\"    Patient never had EGD or colonoscopy procedure    Past Medical History:  Past Medical History:   Diagnosis Date    Diabetes mellitus     Hypertension      Past Surgical History:  History reviewed. No pertinent surgical history.   Social History:   Social History     Tobacco Use    Smoking status: Never    Smokeless tobacco: Never   Substance Use Topics    Alcohol use: Not Currently      Family History:  History reviewed. No pertinent family history.  Home Meds:  Medications Prior to Admission   Medication Sig Dispense Refill Last Dose    amLODIPine (NORVASC) 10 MG tablet Take 1 tablet by mouth Daily.   5/24/2024    aspirin 325 MG EC tablet Take 1 tablet by mouth Daily.   5/24/2024    cloNIDine (CATAPRES) 0.1 MG tablet Take 1 tablet by mouth 2 (Two) Times a Day.   5/24/2024    clopidogrel (PLAVIX) 75 MG tablet Take 1 tablet by mouth Daily.   5/24/2024    hydrALAZINE (APRESOLINE) 25 MG tablet Take 1 tablet by mouth 2 (Two) Times a Day.   5/24/2024    losartan-hydrochlorothiazide (HYZAAR) 100-25 MG per tablet Take 1 tablet by mouth Daily.   5/24/2024    metoprolol succinate XL (TOPROL-XL) 50 MG 24 hr tablet Take 1 tablet by mouth Every Evening.   5/24/2024    pantoprazole (PROTONIX) 40 MG EC tablet Take 1 tablet by mouth Daily.   5/24/2024    PEG 3350 17 GM/SCOOP powder Take 17 g by mouth Daily.   Past Week    vitamin D (ERGOCALCIFEROL) 1.25 MG (14400 UT) capsule capsule Take 1 tablet by mouth 1 (One) Time Per Week.   5/24/2024    Ozempic, 0.25 or 0.5 MG/DOSE, 2 MG/3ML solution pen-injector Inject 0.5 mg under the skin into the appropriate area as directed 1 (One) Time Per Week.   5/21/2024     Current Meds:   amLODIPine, 10 mg, Oral, Daily  [Held by provider] aspirin, 325 mg, Oral, Daily  cloNIDine, 0.1 mg, Oral, BID  [Held by provider] clopidogrel, 75 mg, Oral, " Daily  erythromycin, 125 mg, Intravenous, Once  losartan, 100 mg, Oral, Q24H   And  hydroCHLOROthiazide, 25 mg, Oral, Q24H  metoprolol succinate XL, 50 mg, Oral, Q PM  pantoprazole, 40 mg, Intravenous, BID AC  polyethylene glycol, 2,000 mL, Oral, Q6H  sodium chloride, 10 mL, Intravenous, Q12H  tamsulosin, 0.4 mg, Oral, Daily      Allergies:  No Known Allergies    Objective     Vital Signs  Temp:  [97.8 °F (36.6 °C)-99.1 °F (37.3 °C)] 97.9 °F (36.6 °C)  Heart Rate:  [76-97] 85  Resp:  [18-20] 18  BP: ()/(59-96) 135/88  Physical Exam:  General Appearance:    Alert and oriented, normal affect   Head:    Normocephalic, without obvious abnormality, atraumatic   Eyes:          conjunctivae and sclerae normal, no icterus, pupils round and reactive to light   Oral cavity: Moist and intact, normal dentation   Neck:   Supple, no adenopathy   Chest Wall/Lungs:    No abnormalities observed, equal on expansion bilaterally, no use of extrarespiratory muscles noted   Abdomen:     Soft, nondistended, nontender; normal bowel sounds, no hepatospleenomegaly, no ascites   Extremities:   no edema, clubbing, or cyanosis   Skin:   No bruising or rash   Psychiatric:  normal mood and insight     Results Review:   I reviewed the patient's new clinical results.    Results from last 7 days   Lab Units 05/25/24  0603 05/24/24  1415   WBC 10*3/mm3 9.46 14.00*   HEMOGLOBIN g/dL 11.4* 11.9*   MCV fL 84.8 84.6   PLATELETS 10*3/mm3 239 283         Lab 05/24/24  1415   NEUTROS ABS 12.13*     Results from last 7 days   Lab Units 05/25/24  0603 05/24/24  1415   BUN mg/dL 20 25*   CREATININE mg/dL 1.70* 1.89*   SODIUM mmol/L 138 136   POTASSIUM mmol/L 3.4* 3.0*   CHLORIDE mmol/L 101 96*   CO2 mmol/L 28.3 28.7   GLUCOSE mg/dL 146* 117*      Results from last 7 days   Lab Units 05/24/24  1415   PROTIME Seconds 12.9   INR  0.96     Results from last 7 days   Lab Units 05/24/24  1415   AST (SGOT) U/L 15   ALT (SGPT) U/L 16   ALK PHOS U/L 74    BILIRUBIN mg/dL 0.6   TOTAL PROTEIN g/dL 6.9   ALBUMIN g/dL 4.1      Radiology:  CT Abdomen Pelvis With Contrast    Result Date: 5/24/2024  Impression:  1. Mild left-sided hydroureteronephrosis with delayed nephrogram secondary to an obstructing 8 mm stone within the mid left ureter. There are additional punctate nonobstructing left renal stones.    Electronically Signed By-Alec Lawrence MD On:5/24/2024 6:20 PM       Impression:     GI bleed    Left ureteral stone    HTN (hypertension)     Impression:    Blood in stool  Acute diarrhea  Presyncope  FOBT and fecal lactoferrin positive  Long-term NSAIDs intake  Dual antiplatelet therapy  Left-sided obstructing ureteral stone with hydronephrosis and elevated creatinine    Plan and Recommendations:    Patient with 2 days history of acute diarrhea followed by blood in stool with lightheadedness and dizziness and a feeling of going to pass out.  He is on aspirin and Plavix as well as long-term high-dose ibuprofen on daily basis.  The likely differential diagnoses included UGI versus LGI bleed with infectious versus inflammatory diarrhea.  No significant drop in hemoglobin.  Diarrhea has slowed down.  Hemodynamics are stable patient is potentially at high risk for the procedures due to presence of dual antiplatelet therapy and NSAIDs intake.  On top of it, he is on Ozempic with last dose was on Tuesday.  The risks and benefits discussed with the patient.  He understands and agreed to proceed with the procedure.  He should finish the colon prep within 2 hours.  Continue Protonix 40 mg IV twice daily.  Will schedule for EGD and colonoscopy after discussing the safety of the procedure with the anesthesia team given intake of dual antiplatelet, NSAIDs and Ozempic.     I discussed the patients findings and my recommendations with patient, nursing staff, and consulting provider.      Electronically signed by Axel Zaragoza MD, 05/25/24, 10:20 AM EDT.

## 2024-05-25 NOTE — H&P
[facility]   HISTORY AND PHYSICAL    Patient Name: Robert Valdez  : 1975  MRN: [id number]  Primary Care Physician:  Manan Gipson APRN  Date of admission: 2024    Subjective   Subjective     Chief Complaint: Melena    HPI:    Robert Valdez is a 49 y.o. male with past medical history of hypertension, prediabetes, morbid obesity, and GERD presented to the ED with complaints of dark stools.  Patient states that for the last 2 days he has been having bowel movements with dark stools along with chills, diarrhea, and generalized weakness.  Patient admits to relatively heavy NSAID use taking ibuprofen 800 mg at least 5 times a week.  Patient was seen at this hospital 3 weeks ago with flank pain and was found to have renal stones.  Due to concerns he came to the ED for further evaluation.  In the ED patient's vitals were all within normal limits.  Labs showed that he had leukocytosis with anemia, reduced renal function, and positive fecal occult blood.  Remaining labs were relatively unremarkable including a normal INR and lactic acid.  CT abdomen showed left-sided hydronephrosis with obstructing 8 mm stone in the left ureter.  Patient denied having any significant flank pain as well as fevers, headaches, focal weakness, chest pain, palpitation, shortness of breath, cough, abdominal pain, nausea, vomiting, diarrhea, constipation, dysuria, hematuria, or anxiety.  Patient admitted for further evaluation and treatment    Review of Systems   All systems were reviewed and negative except for: As per HPI    Personal History     Past Medical History:   Diagnosis Date    Diabetes mellitus     Hypertension        History reviewed. No pertinent surgical history.    Family History: family history is not on file. Otherwise pertinent FHx was reviewed and not pertinent to current issue.    Social History:  reports that he has never smoked. He has never used smokeless tobacco. He reports that he does  not currently use alcohol. He reports that he does not use drugs.    Home Medications:  Semaglutide(0.25 or 0.5MG/DOS), amLODIPine, aspirin, cloNIDine, clopidogrel, hydrALAZINE, losartan-hydrochlorothiazide, metoprolol succinate XL, pantoprazole, polyethylene glycol, and vitamin D      Allergies:  No Known Allergies    Objective   Objective     Vitals:   Temp:  [98.3 °F (36.8 °C)-99.1 °F (37.3 °C)] 99.1 °F (37.3 °C)  Heart Rate:  [76-97] 83  Resp:  [18-20] 18  BP: ()/(59-96) 124/76  Physical Exam    Constitutional: Awake, alert, obese   Eyes: PERRLA, sclerae anicteric, no conjunctival injection   HENT: NCAT, mucous membranes moist   Neck: Supple, no thyromegaly, no lymphadenopathy, trachea midline   Respiratory: Clear to auscultation bilaterally, nonlabored respirations    Cardiovascular: RRR, no murmurs, rubs, or gallops, palpable pedal pulses bilaterally   Gastrointestinal: Positive bowel sounds, soft, nontender, nondistended   Musculoskeletal: No bilateral ankle edema, no clubbing or cyanosis to extremities   Psychiatric: Appropriate affect, cooperative   Neurologic: Oriented x 3, strength symmetric in all extremities, Cranial Nerves grossly intact to confrontation, speech clear   Skin: No rashes     Result Review    Result Review:  I have personally reviewed the results from the time of this admission to 5/25/2024 03:02 EDT and agree with these findings:  [x]  Laboratory list / accordion  []  Microbiology  [x]  Radiology  [x]  EKG/Telemetry   []  Cardiology/Vascular   []  Pathology  []  Old records  []  Other:  Most notable findings include: Leukocytosis, reduced renal function, positive fecal occult blood, normal INR, normal lactic acid, CT abdomen with obstructing 8 mm stone      Assessment & Plan   Assessment / Plan     Brief Patient Summary:  Robert Valdez is a 49 y.o. male with past medical history of hypertension, prediabetes, morbid obesity, and GERD presented to the ED with complaints of  dark stools    Active Hospital Problems:  Active Hospital Problems    Diagnosis     **GI bleed     Left ureteral stone     HTN (hypertension)      Plan:     GI bleed  -Admit to Medical floor  -Hemoglobin below baseline, transfuse as needed  -INR within normal limits  -Patient admits to NSAID use. Ibuprofen 800 mg at least 5 times weekly  -Gastroenterology consulted  -Patient to have EGD and colonoscopy tomorrow  -Will prep with GoLytely  -IV Protonix  -Follow labs  -Supportive care    Ureterolithiasis  -Admit to medical floor  -CT abdomen reviewed.  Obstructing renal stone with hydronephrosis noted  -Pain meds as needed  -Antiemetics as needed  -Urology consulted  -Supportive care    HTN  -Currently well controlled  -PRN BP meds  -Resume home meds when available  -Titrate if needed    GI ppx  DVT ppx        CODE STATUS:    Level Of Support Discussed With: Patient  Code Status (Patient has no pulse and is not breathing): CPR (Attempt to Resuscitate)  Medical Interventions (Patient has pulse or is breathing): Full Support    Admission Status:  I believe this patient meets inpatient status.      Electronically signed by Rickey Murrieta MD, 05/25/24, 3:02 AM EDT.

## 2024-05-25 NOTE — CONSULTS
[facility]   UROLOGY Consult Note    Patient Name: Robert Valdez  : 1975  MRN: [id number]  Primary Care Physician:  Manan Gipson APRN  Referring Physician: No ref. provider found  Date of admission: 2024    Subjective   Subjective     Chief Complaint:     Left ureteral stone    HPI:  Robert Valdez is a 49 y.o. male       Left ureteral stone  GI bleed  Was on Plavix for history of cerebral aneurysm    past medical history of hypertension, prediabetes, morbid obesity, and GERD       Patient with severe pain in his left flank a couple weeks ago, went to the ED.    No pain in left flank for the last week.  No left flank pain today    No gross hematuria    On Plavix per neurology secondary to history of cerebral aneurysm with stent    Took Plavix 2024    Patient does have a lot of knee pain recently been taking ibuprofen 800  3-4 times weekly    Has baseline frequency/urgency for the last couple years.    2024 ED - presented with GI bleed.      2024 CT abdomen/pelvis with - mild left hydro 8 mm stone mid left ureter.  Punctate stone in the left kidney 1 mm no other stones.  Images reviewed  2024 1.7, GFR 48, H/H11/34 stable    On semaglutide    Has not seen a urologist because no previous kidney stones    No previous urologic surgery          Review of Systems     10 systems reviewed and are negative other than what is listed in the HPI    Personal History     Past Medical History:   Diagnosis Date    Diabetes mellitus     Hypertension        History reviewed. No pertinent surgical history.    Family History: family history is not on file. Otherwise pertinent FHx was reviewed and not pertinent to current issue.    Social History:  reports that he has never smoked. He has never used smokeless tobacco. He reports that he does not currently use alcohol. He reports that he does not use drugs.    Home Medications:  Semaglutide(0.25 or 0.5MG/DOS), amLODIPine, aspirin,  cloNIDine, clopidogrel, hydrALAZINE, losartan-hydrochlorothiazide, metoprolol succinate XL, pantoprazole, polyethylene glycol, and vitamin D    Allergies:  No Known Allergies    Objective    Objective     Vitals:   Temp:  [97.8 °F (36.6 °C)-99.1 °F (37.3 °C)] 97.8 °F (36.6 °C)  Heart Rate:  [76-97] 87  Resp:  [18-20] 18  BP: ()/(59-96) 124/85    Physical Exam:   Constitutional: Awake, alert    Respiratory: Clear to auscultation bilaterally, nonlabored respirations    Cardiovascular: RRR, no murmurs, rubs, or gallops, palpable pedal pulses bilaterally   Gastrointestinal: Positive bowel sounds, soft, nontender, nondistended       Result Review    Result Review:  I have personally reviewed the results from the time of this admission to 5/25/2024 07:42 EDT and agree with these findings:  []  Laboratory  []  Microbiology  []  Radiology  []  EKG/Telemetry   []  Cardiology/Vascular   []  Pathology  []  Old records  []  Other:      Assessment & Plan   Assessment / Plan     Brief Patient Summary:  Robert Valdez is a 49 y.o. male     Active Hospital Problems:  Active Hospital Problems    Diagnosis     **GI bleed     Left ureteral stone     HTN (hypertension)        Left ureteral stone      Patient currently still anticoagulated secondary to taking Plavix yesterday.  He has also been on ibuprofen 800.   At this time I will wait on any surgery.  Due to the size of the stone patient will need cystoscopy with left ureteroscopy with laser and left  ureteral stent placement.  Risks and benefits were discussed including bleeding, infection and damage to the urinary system.  We also discussed the risk of anesthesia up to and including death.  Patient voiced understanding     8 mm stone would likely not pass on its own we discussed this today.    Timing will be based on if Plavix is held in the hospital, patient's clinical course and his pain level from his stone.      I will continue to  follow                  Electronically signed by Omid Bardales MD, 05/25/24, 7:42 AM EDT.

## 2024-05-25 NOTE — PLAN OF CARE
Goal Outcome Evaluation:  Plan of Care Reviewed With: patient        Progress: no change  Outcome Evaluation: Pt remained A&Ox4 this shift. Also, pt remained on room air maintaining stable oxygen saturation. Pt denied pain this shift. Pt underwent Colonoscopy and EGD this shift. Pt tolerated well. Pt diet was advanced to a full liquid diet, as per order. Pt tolerated well. All other vital signs remained stable.

## 2024-05-25 NOTE — SIGNIFICANT NOTE
"Pt is very concerned about being off \"blood thinners\" due to previous aneurysm with coil/stent placement. Stated Neurologist has told patient can not be off due to possibility of clots and/or stoke possibility. Is asking that Neurology be consulted to be sure that everything is being done from GI, Urology and Neuro perspective please.   "

## 2024-05-26 ENCOUNTER — ANESTHESIA EVENT (OUTPATIENT)
Dept: PERIOP | Facility: HOSPITAL | Age: 49
End: 2024-05-26
Payer: MEDICAID

## 2024-05-26 ENCOUNTER — ANESTHESIA (OUTPATIENT)
Dept: PERIOP | Facility: HOSPITAL | Age: 49
End: 2024-05-26
Payer: MEDICAID

## 2024-05-26 LAB
ANION GAP SERPL CALCULATED.3IONS-SCNC: 10.7 MMOL/L (ref 5–15)
BUN SERPL-MCNC: 14 MG/DL (ref 6–20)
BUN/CREAT SERPL: 8.5 (ref 7–25)
CALCIUM SPEC-SCNC: 8.6 MG/DL (ref 8.6–10.5)
CHLORIDE SERPL-SCNC: 101 MMOL/L (ref 98–107)
CO2 SERPL-SCNC: 27.3 MMOL/L (ref 22–29)
CREAT SERPL-MCNC: 1.65 MG/DL (ref 0.76–1.27)
CRYPTOSP DNA STL QL NAA+NON-PROBE: NOT DETECTED
DEPRECATED RDW RBC AUTO: 44.5 FL (ref 37–54)
E HISTOLYT DNA STL QL NAA+NON-PROBE: NOT DETECTED
EGFRCR SERPLBLD CKD-EPI 2021: 50.6 ML/MIN/1.73
ERYTHROCYTE [DISTWIDTH] IN BLOOD BY AUTOMATED COUNT: 14.2 % (ref 12.3–15.4)
G LAMBLIA DNA STL QL NAA+NON-PROBE: NOT DETECTED
GLUCOSE SERPL-MCNC: 148 MG/DL (ref 65–99)
HCT VFR BLD AUTO: 34.6 % (ref 37.5–51)
HGB BLD-MCNC: 11.2 G/DL (ref 13–17.7)
MCH RBC QN AUTO: 27.7 PG (ref 26.6–33)
MCHC RBC AUTO-ENTMCNC: 32.4 G/DL (ref 31.5–35.7)
MCV RBC AUTO: 85.6 FL (ref 79–97)
PLATELET # BLD AUTO: 213 10*3/MM3 (ref 140–450)
PMV BLD AUTO: 10.9 FL (ref 6–12)
POTASSIUM SERPL-SCNC: 3.3 MMOL/L (ref 3.5–5.2)
RBC # BLD AUTO: 4.04 10*6/MM3 (ref 4.14–5.8)
SODIUM SERPL-SCNC: 139 MMOL/L (ref 136–145)
WBC NRBC COR # BLD AUTO: 8.7 10*3/MM3 (ref 3.4–10.8)

## 2024-05-26 PROCEDURE — 80048 BASIC METABOLIC PNL TOTAL CA: CPT | Performed by: INTERNAL MEDICINE

## 2024-05-26 PROCEDURE — 85027 COMPLETE CBC AUTOMATED: CPT | Performed by: INTERNAL MEDICINE

## 2024-05-26 PROCEDURE — 36415 COLL VENOUS BLD VENIPUNCTURE: CPT | Performed by: INTERNAL MEDICINE

## 2024-05-26 PROCEDURE — 99233 SBSQ HOSP IP/OBS HIGH 50: CPT | Performed by: UROLOGY

## 2024-05-26 PROCEDURE — 99233 SBSQ HOSP IP/OBS HIGH 50: CPT | Performed by: INTERNAL MEDICINE

## 2024-05-26 PROCEDURE — 99222 1ST HOSP IP/OBS MODERATE 55: CPT | Performed by: STUDENT IN AN ORGANIZED HEALTH CARE EDUCATION/TRAINING PROGRAM

## 2024-05-26 PROCEDURE — 83993 ASSAY FOR CALPROTECTIN FECAL: CPT | Performed by: INTERNAL MEDICINE

## 2024-05-26 RX ADMIN — HYDROCHLOROTHIAZIDE 25 MG: 25 TABLET ORAL at 08:56

## 2024-05-26 RX ADMIN — METOPROLOL SUCCINATE 50 MG: 50 TABLET, EXTENDED RELEASE ORAL at 17:10

## 2024-05-26 RX ADMIN — PANTOPRAZOLE SODIUM 40 MG: 40 INJECTION, POWDER, FOR SOLUTION INTRAVENOUS at 06:41

## 2024-05-26 RX ADMIN — CLONIDINE HYDROCHLORIDE 0.1 MG: 0.1 TABLET ORAL at 20:57

## 2024-05-26 RX ADMIN — Medication 10 ML: at 20:57

## 2024-05-26 RX ADMIN — LOSARTAN POTASSIUM 100 MG: 50 TABLET, FILM COATED ORAL at 08:56

## 2024-05-26 RX ADMIN — Medication 10 ML: at 08:57

## 2024-05-26 RX ADMIN — PANTOPRAZOLE SODIUM 40 MG: 40 INJECTION, POWDER, FOR SOLUTION INTRAVENOUS at 17:10

## 2024-05-26 RX ADMIN — AMLODIPINE BESYLATE 10 MG: 10 TABLET ORAL at 08:56

## 2024-05-26 RX ADMIN — TAMSULOSIN HYDROCHLORIDE 0.4 MG: 0.4 CAPSULE ORAL at 08:56

## 2024-05-26 RX ADMIN — CLONIDINE HYDROCHLORIDE 0.1 MG: 0.1 TABLET ORAL at 08:56

## 2024-05-26 NOTE — CONSULTS
[facility]  [facility] Tele[facility] Consult Services    Routine Consult New    Patient Name:   Robert Valdez  YOB: 1975  Identification Number:   MRN - [id number]  Date of Service:   05/26/2024 08:33:34    Diagnosis        I67.1 - Cerebral aneurysm, nonruptured    Impression  49yoM with hx of HTN, aneurysm s/p coil and stent (per report, no records available), pre diabetes, who is being managed for a GI bleed and is s/p colonoscopy with multiple ulcerated areas and biopsy sent, in the meantime off dual antiplatelet therapy. Patient describes having hx of aneurysm with stent/coil placed in Jan/2024 and was then placed on dual therapy with ASA and Plavix by his surgeon. His neuro exam is non focal and he has no neurological complaints at this time. Neurology consulted for further assistance as he is currently off his antiplatelets. While this should be a risk vs benefit discussion given his acute GI bleed, I am unable to comment on alternatives and risks of being off antithrombotic therapy as this is out of our scope of practice. Would obtain records from his recent hospitalization and consult with Neurosurgery or interventional radiology.     ---------------------------------------    Chief Complaint:  hx stent/aneurysm    History of Present Illness:  Patient is a 49 year old Male.  49yoM with hx of HTN, aneurysm s/o coil and stent, pre diabetes, who is being managed for a GI bleed and is s/p colonoscopy with multiple ulcerated areas and biopsy sent, in the meantime off dual antiplatelet therapy.  Patient describes to me having hx of aneurysm with stent/coil placed in Jan/2024.  He states on 1/23/2024 had coil and stent in the brain after a severe HA/LOC episode that was found to be caused by an aneurysm. He denies current HA, focal weakness, sensory loss. No speech changes. No vision disturbances.  He adds he was counselled by his neurosurgeon he he would need to be on ASA and  Plavix for 8-9 months due to his stent.  Neurology consulted for further assistance.       Past Medical History:       Hypertension       There is no history of Stroke    Medications:    No Anticoagulant use   Antiplatelet use: Yes [id number], [id number]  Reviewed EMR for current medications    Allergies:   Reviewed    Social History:  Smoking: No  Alcohol Use: No    Family History:    There is no family history of premature cerebrovascular disease pertinent to this consultation    ROS :  14 Points Review of Systems was performed and was negative except mentioned in HPI.    Past Surgical History:  There Is No Surgical History Contributory To Today’s Visit    Examination    Neuro Exam:  General: Alert,Awake, Oriented to Time, Place, Person    Speech: Fluent:    Language: Intact:    Face: Symmetric:    Facial Sensation: Intact:    Extraocular Movements: Intact:    Motor Exam: No Drift:    Sensation: Intact:    Coordination: Intact: RUE, LUE           Patient/Family was informed the Neurology Consult would happen via TeleHealth consult by way of interactive audio and video telecommunications and consented to receiving care in this manner.    Telehealth Neurology consultation was provided. I spent 15 minutes providing telehealth care. This includes time spent for face to face visit via telemedicine, review of medical records, imaging studies and discussion of findings with providers, the patient and/or family.      Dr Alex Harper      [facility]  For Inpatient follow-up with [facility] physician please call [facility] [phone]. This is not an outpatient service. Post hospital discharge, please contact hospital directly.    Please do not communicate with [facility] physicians via secure chat. If you have any questions, Please contact [facility].  Please call or reconsult our service if there are any clinical or diagnostic changes.

## 2024-05-26 NOTE — PLAN OF CARE
Goal Outcome Evaluation:              Outcome Evaluation: Pt was AOx4 throughout the shift, on room air and up ad-shannon. Pt tolerating full liquid diet, and no complaints of pain. Pt stool sample collected and bowel movement was a bit bloody and loose. No acute changes noted this shift. Pt appears to be in no apparent distress at this time, denies any current needs and has call light within reach.

## 2024-05-26 NOTE — PROGRESS NOTES
[facility]  Inpatient Progress Note    Reason for Follow Up: Bloody diarrhea and syncope    Interval History:     Patient status post EGD and colonoscopy performed yesterday.    Colonoscopy with Linear streaks of ulcerated mucosa from the rectum upto the descending colon and in patchy distribution in the tranverse colon and at hepatic flexure with stigmata of recent bleed. Random Biopsies     In the sigmoid colon ulcerated mucosa with sloughing and deep laceration of mucosa noted, To repair the defect, the tissue edges were approximated and two hemostatic clips were successfully placed (MR conditional). Closure of the defect was successful.     EGD with erythema of the gastric mucosa    Patient seen and examined at this morning.  He mentioned that had 2 small volume BM with some blood with it.  No abdominal pain.  Diarrhea has improved.  He is tolerating full liquid diet    Lab workup with hemoglobin 11.2 g/dL from 11.9 g/dL at admission.  Creatinine improved to 1.65  CRP 2.90, ESR 27.  Fecal lactoferrin was positive  C. difficile and enteric bacterial panel negative  Fecal calprotectin and enteric parasitic panel in process  Tissue pathology pending    Patient is on hold for aspirin and Plavix  Current Facility-Administered Medications:     amLODIPine (NORVASC) tablet 10 mg, 10 mg, Oral, Daily, Axel Zaragoza MD, 10 mg at 05/25/24 0946    [Held by provider] aspirin EC tablet 325 mg, 325 mg, Oral, Daily, Axel Zaragoza MD    cloNIDine (CATAPRES) tablet 0.1 mg, 0.1 mg, Oral, BID, Axel Zaragoza MD, 0.1 mg at 05/25/24 2035    [Held by provider] clopidogrel (PLAVIX) tablet 75 mg, 75 mg, Oral, Daily, Axel Zaragoza MD    losartan (COZAAR) tablet 100 mg, 100 mg, Oral, Q24H, 100 mg at 05/25/24 0946 **AND** hydroCHLOROthiazide tablet 25 mg, 25 mg, Oral, Q24H, Axel Zaragoza MD, 25 mg at 05/25/24 0946    metoprolol succinate XL (TOPROL-XL) 24 hr tablet 50 mg, 50 mg, Oral, Q PM,  Axel Zaragoza MD, 50 mg at 05/25/24 1715    morphine injection 1 mg, 1 mg, Intravenous, Q4H PRN **AND** naloxone (NARCAN) injection 0.4 mg, 0.4 mg, Intravenous, Q5 Min PRN, Axel Zaragoza MD    ondansetron (ZOFRAN) injection 4 mg, 4 mg, Intravenous, Q6H PRN, Axel Zaragoza MD    pantoprazole (PROTONIX) injection 40 mg, 40 mg, Intravenous, BID AC, Axel Zaragoza MD, 40 mg at 05/26/24 0641    sodium chloride 0.9 % flush 10 mL, 10 mL, Intravenous, PRN, Axel Zaragoza MD, 10 mL at 05/25/24 0106    [COMPLETED] Insert Peripheral IV, , , Once **AND** sodium chloride 0.9 % flush 10 mL, 10 mL, Intravenous, PRN, Axel Zaragoza MD    sodium chloride 0.9 % flush 10 mL, 10 mL, Intravenous, Q12H, Axel Zaragoza MD, 10 mL at 05/25/24 2035    sodium chloride 0.9 % flush 10 mL, 10 mL, Intravenous, PRN, Axel Zaragoza MD    sodium chloride 0.9 % infusion 40 mL, 40 mL, Intravenous, PRN, Axel Zaragoza MD    tamsulosin (FLOMAX) 24 hr capsule 0.4 mg, 0.4 mg, Oral, Daily, Axel Zaragoza MD, 0.4 mg at 05/25/24 0121    Objective     Vital Signs  Temp:  [97.5 °F (36.4 °C)-99 °F (37.2 °C)] 98.1 °F (36.7 °C)  Heart Rate:  [76-96] 89  Resp:  [14-20] 16  BP: (111-169)/(66-86) 122/84  Body mass index is 42.64 kg/m².    Intake/Output Summary (Last 24 hours) at 5/26/2024 0800  Last data filed at 5/26/2024 0355  Gross per 24 hour   Intake 2228.33 ml   Output 2855 ml   Net -626.67 ml     No intake/output data recorded.     Physical Exam:  General     Alert and oriented, normal affect   Head:    Normocephalic, without obvious abnormality, atraumatic   Eyes:          conjunctivae and sclerae normal, no icterus, pupils round and reactive to light   Oral cavity: Moist and intact, normal dentation   Neck:   Supple, no adenopathy   Chest Wall/Lungs:    No abnormalities observed, equal on expansion bilaterally, No use of extrarespiratory muscles noted   Abdomen:     Soft, nondistended, nontender; normal bowel  "sounds, no hepatospleenomegaly, no ascites   Extremities:   no edema, clubbing, or cyanosis   Skin:   No bruising or rash   Psychiatric:  Normal mood and insight       Results Review:      Results from last 7 days   Lab Units 05/26/24  0517 05/25/24  0603 05/24/24  1415   WBC 10*3/mm3 8.70 9.46 14.00*   HEMOGLOBIN g/dL 11.2* 11.4* 11.9*   HEMATOCRIT % 34.6* 34.7* 36.2*   PLATELETS 10*3/mm3 213 239 283     Results from last 7 days   Lab Units 05/26/24  0517 05/25/24  0603 05/24/24  1415   SODIUM mmol/L 139 138 136   POTASSIUM mmol/L 3.3* 3.4* 3.0*   CHLORIDE mmol/L 101 101 96*   CO2 mmol/L 27.3 28.3 28.7   BUN mg/dL 14 20 25*   CREATININE mg/dL 1.65* 1.70* 1.89*   CALCIUM mg/dL 8.6 8.5* 8.9   BILIRUBIN mg/dL  --   --  0.6   ALK PHOS U/L  --   --  74   ALT (SGPT) U/L  --   --  16   AST (SGOT) U/L  --   --  15   GLUCOSE mg/dL 148* 146* 117*     Invalid input(s): \"3\"   No results found for: \"LIPASE\"    Radiology:  CT Abdomen Pelvis With Contrast    Result Date: 5/24/2024  Impression:  1. Mild left-sided hydroureteronephrosis with delayed nephrogram secondary to an obstructing 8 mm stone within the mid left ureter. There are additional punctate nonobstructing left renal stones.    Electronically Signed By-Alec Lawrence MD On:5/24/2024 6:20 PM        Impression:     Active colitis with ulceration   Elevated CRP and ESR   Blood in stool  Acute diarrhea-improved  Long-term NSAIDs intake  Dual antiplatelet therapy  Left-sided obstructing ureteral stone with hydronephrosis and elevated creatinine     Plan and Recommendations:     Patient was admitted with 2 days history of acute diarrhea followed by blood in stool with lightheadedness and dizziness and a feeling of going to pass out.  He is on aspirin and Plavix as well as long-term high-dose ibuprofen on daily basis.  Patient underwent EGD and colonoscopy with active linear colitis with ulceration from the rectum up to the descending colon and in patchy distribution in the " transverse colon and hepatic flexure.  Hemoglobin stable at 11.2 g/dL from 11.9 g/dL.    -Hold aspirin and Plavix for at least 3 days as patient is concerned for clotting of the stent.   -Should consider Doppler ultrasound of abdomen and pelvis to rule out clotting of mesenteric and celiac blood vessels  -Follow-up pathology results  -Low residue diet  -Protonix 40 mg orally daily  -No NSAIDs    I discussed the patients findings and my recommendations with patient and nursing staff.      Electronically signed by Axel Zaragoza MD, 05/26/24, 8:00 AM EDT.

## 2024-05-26 NOTE — PROGRESS NOTES
[facility]   Urology Progress Note    Patient Name: Robert Valdez  : 1975  MRN: [id number]  Primary Care Physician:  Ketty Toussaint, ADRIAN  Date of admission: 2024    Subjective   Subjective       No left flank pain    No gross hematuria    Still with intermittent bloody bowel movements    Objective   Objective     Vitals:   Temp:  [97.5 °F (36.4 °C)-99 °F (37.2 °C)] 98.1 °F (36.7 °C)  Heart Rate:  [76-96] 89  Resp:  [14-20] 16  BP: (111-169)/(66-86) 122/84  Physical Exam     Alert and oriented x3  No acute distress  Unlabored respirations  Nontender/nondistended       Result Review    Result Review:  I have personally reviewed the results from the time of this admission to 2024 09:27 EDT and agree with these findings:  []  Laboratory  []  Microbiology  []  Radiology  []  EKG/Telemetry   []  Cardiology/Vascular   []  Pathology  []  Old records  []  Other:      Assessment & Plan   Assessment / Plan     Brief Patient Summary:  Robert Valdez is a 49 y.o. male      Active Hospital Problems:  Active Hospital Problems    Diagnosis     **GI bleed     Left ureteral stone     HTN (hypertension)     Blood in stool     Acute diarrhea     Pre-syncope     NSAIDs adverse reaction, initial encounter          Left ureteral stone        still anticoagulated secondary to taking Plavix on Friday .       will need cystoscopy with left ureteroscopy with laser and left  ureteral stent placement.  Risks and benefits were discussed including bleeding, infection and damage to the urinary system.  We also discussed the risk of anesthesia up to and including death.  Patient voiced understanding       Patient with history of cerebral aneurysm with stent.  He is not supposed to be off anticoagulation very many days per neurology    Will tentatively plan for tomorrow    N.p.o. after midnight                   Electronically signed by Omid Bardales MD, 24, 9:27 AM EDT.

## 2024-05-26 NOTE — PLAN OF CARE
Goal Outcome Evaluation:  Plan of Care Reviewed With: patient        Progress: no change  Outcome Evaluation: pt remains aox4 on room air. Pt tolerating full liquid diet well. Pt up adlib, ambulating around the unit several times today. C.diff resulted negative. Pt has no complaints of any pain or discomfort. Pt had a bowel movement today with no blood in stool noted. Pt will be strict NPO at midnight for procedure in the morning. No complaints at this time.

## 2024-05-27 ENCOUNTER — APPOINTMENT (OUTPATIENT)
Dept: GENERAL RADIOLOGY | Facility: HOSPITAL | Age: 49
End: 2024-05-27
Payer: MEDICAID

## 2024-05-27 LAB
ANION GAP SERPL CALCULATED.3IONS-SCNC: 10.9 MMOL/L (ref 5–15)
BUN SERPL-MCNC: 15 MG/DL (ref 6–20)
BUN/CREAT SERPL: 9.9 (ref 7–25)
CALCIUM SPEC-SCNC: 9.1 MG/DL (ref 8.6–10.5)
CHLORIDE SERPL-SCNC: 98 MMOL/L (ref 98–107)
CO2 SERPL-SCNC: 28.1 MMOL/L (ref 22–29)
CREAT SERPL-MCNC: 1.52 MG/DL (ref 0.76–1.27)
DEPRECATED RDW RBC AUTO: 42.9 FL (ref 37–54)
EGFRCR SERPLBLD CKD-EPI 2021: 55.8 ML/MIN/1.73
ERYTHROCYTE [DISTWIDTH] IN BLOOD BY AUTOMATED COUNT: 13.7 % (ref 12.3–15.4)
GLUCOSE BLDC GLUCOMTR-MCNC: 128 MG/DL (ref 70–99)
GLUCOSE SERPL-MCNC: 106 MG/DL (ref 65–99)
HCT VFR BLD AUTO: 36.6 % (ref 37.5–51)
HGB BLD-MCNC: 12 G/DL (ref 13–17.7)
MCH RBC QN AUTO: 27.8 PG (ref 26.6–33)
MCHC RBC AUTO-ENTMCNC: 32.8 G/DL (ref 31.5–35.7)
MCV RBC AUTO: 84.9 FL (ref 79–97)
PLATELET # BLD AUTO: 235 10*3/MM3 (ref 140–450)
PMV BLD AUTO: 10.4 FL (ref 6–12)
POTASSIUM SERPL-SCNC: 3.4 MMOL/L (ref 3.5–5.2)
RBC # BLD AUTO: 4.31 10*6/MM3 (ref 4.14–5.8)
SODIUM SERPL-SCNC: 137 MMOL/L (ref 136–145)
WBC NRBC COR # BLD AUTO: 7.41 10*3/MM3 (ref 3.4–10.8)

## 2024-05-27 PROCEDURE — C1758 CATHETER, URETERAL: HCPCS | Performed by: UROLOGY

## 2024-05-27 PROCEDURE — 52356 CYSTO/URETERO W/LITHOTRIPSY: CPT | Performed by: UROLOGY

## 2024-05-27 PROCEDURE — C2617 STENT, NON-COR, TEM W/O DEL: HCPCS | Performed by: UROLOGY

## 2024-05-27 PROCEDURE — 36415 COLL VENOUS BLD VENIPUNCTURE: CPT | Performed by: INTERNAL MEDICINE

## 2024-05-27 PROCEDURE — 0T778DZ DILATION OF LEFT URETER WITH INTRALUMINAL DEVICE, VIA NATURAL OR ARTIFICIAL OPENING ENDOSCOPIC: ICD-10-PCS | Performed by: UROLOGY

## 2024-05-27 PROCEDURE — 74420 UROGRAPHY RTRGR +-KUB: CPT | Performed by: UROLOGY

## 2024-05-27 PROCEDURE — 25010000002 PROPOFOL 10 MG/ML EMULSION: Performed by: ANESTHESIOLOGY

## 2024-05-27 PROCEDURE — 25010000002 DEXAMETHASONE PER 1 MG: Performed by: ANESTHESIOLOGY

## 2024-05-27 PROCEDURE — C1769 GUIDE WIRE: HCPCS | Performed by: UROLOGY

## 2024-05-27 PROCEDURE — 76000 FLUOROSCOPY <1 HR PHYS/QHP: CPT

## 2024-05-27 PROCEDURE — 82948 REAGENT STRIP/BLOOD GLUCOSE: CPT | Performed by: ANESTHESIOLOGY

## 2024-05-27 PROCEDURE — 82365 CALCULUS SPECTROSCOPY: CPT | Performed by: UROLOGY

## 2024-05-27 PROCEDURE — 80048 BASIC METABOLIC PNL TOTAL CA: CPT | Performed by: INTERNAL MEDICINE

## 2024-05-27 PROCEDURE — 25810000003 LACTATED RINGERS PER 1000 ML: Performed by: ANESTHESIOLOGY

## 2024-05-27 PROCEDURE — 85027 COMPLETE CBC AUTOMATED: CPT | Performed by: INTERNAL MEDICINE

## 2024-05-27 PROCEDURE — 25010000002 HYDROMORPHONE 1 MG/ML SOLUTION: Performed by: ANESTHESIOLOGY

## 2024-05-27 PROCEDURE — C1894 INTRO/SHEATH, NON-LASER: HCPCS | Performed by: UROLOGY

## 2024-05-27 PROCEDURE — 0TC78ZZ EXTIRPATION OF MATTER FROM LEFT URETER, VIA NATURAL OR ARTIFICIAL OPENING ENDOSCOPIC: ICD-10-PCS | Performed by: UROLOGY

## 2024-05-27 PROCEDURE — 88300 SURGICAL PATH GROSS: CPT | Performed by: UROLOGY

## 2024-05-27 PROCEDURE — 25010000002 ONDANSETRON PER 1 MG: Performed by: ANESTHESIOLOGY

## 2024-05-27 PROCEDURE — 25010000002 FENTANYL CITRATE (PF) 50 MCG/ML SOLUTION: Performed by: ANESTHESIOLOGY

## 2024-05-27 PROCEDURE — 73560 X-RAY EXAM OF KNEE 1 OR 2: CPT

## 2024-05-27 PROCEDURE — 25010000002 MIDAZOLAM PER 1MG: Performed by: ANESTHESIOLOGY

## 2024-05-27 DEVICE — URETERAL STENT
Type: IMPLANTABLE DEVICE | Site: URETER | Status: FUNCTIONAL
Brand: ASCERTA™

## 2024-05-27 RX ORDER — DEXAMETHASONE SODIUM PHOSPHATE 4 MG/ML
INJECTION, SOLUTION INTRA-ARTICULAR; INTRALESIONAL; INTRAMUSCULAR; INTRAVENOUS; SOFT TISSUE AS NEEDED
Status: DISCONTINUED | OUTPATIENT
Start: 2024-05-27 | End: 2024-05-27 | Stop reason: SURG

## 2024-05-27 RX ORDER — OXYCODONE HYDROCHLORIDE 5 MG/1
5 TABLET ORAL
Status: DISCONTINUED | OUTPATIENT
Start: 2024-05-27 | End: 2024-05-27 | Stop reason: HOSPADM

## 2024-05-27 RX ORDER — PROMETHAZINE HYDROCHLORIDE 12.5 MG/1
25 TABLET ORAL ONCE AS NEEDED
Status: DISCONTINUED | OUTPATIENT
Start: 2024-05-27 | End: 2024-05-27 | Stop reason: HOSPADM

## 2024-05-27 RX ORDER — ONDANSETRON 2 MG/ML
INJECTION INTRAMUSCULAR; INTRAVENOUS AS NEEDED
Status: DISCONTINUED | OUTPATIENT
Start: 2024-05-27 | End: 2024-05-27 | Stop reason: SURG

## 2024-05-27 RX ORDER — PROMETHAZINE HYDROCHLORIDE 25 MG/1
25 SUPPOSITORY RECTAL ONCE AS NEEDED
Status: DISCONTINUED | OUTPATIENT
Start: 2024-05-27 | End: 2024-05-27 | Stop reason: HOSPADM

## 2024-05-27 RX ORDER — MEPERIDINE HYDROCHLORIDE 25 MG/ML
12.5 INJECTION INTRAMUSCULAR; INTRAVENOUS; SUBCUTANEOUS
Status: DISCONTINUED | OUTPATIENT
Start: 2024-05-27 | End: 2024-05-27 | Stop reason: HOSPADM

## 2024-05-27 RX ORDER — FENTANYL CITRATE 50 UG/ML
INJECTION, SOLUTION INTRAMUSCULAR; INTRAVENOUS AS NEEDED
Status: DISCONTINUED | OUTPATIENT
Start: 2024-05-27 | End: 2024-05-27 | Stop reason: SURG

## 2024-05-27 RX ORDER — METRONIDAZOLE 500 MG/100ML
500 INJECTION, SOLUTION INTRAVENOUS EVERY 8 HOURS
Status: COMPLETED | OUTPATIENT
Start: 2024-05-27 | End: 2024-05-27

## 2024-05-27 RX ORDER — SODIUM CHLORIDE, SODIUM LACTATE, POTASSIUM CHLORIDE, CALCIUM CHLORIDE 600; 310; 30; 20 MG/100ML; MG/100ML; MG/100ML; MG/100ML
INJECTION, SOLUTION INTRAVENOUS CONTINUOUS PRN
Status: DISCONTINUED | OUTPATIENT
Start: 2024-05-27 | End: 2024-05-27 | Stop reason: SURG

## 2024-05-27 RX ORDER — ASPIRIN 325 MG
325 TABLET, DELAYED RELEASE (ENTERIC COATED) ORAL DAILY
Status: DISCONTINUED | OUTPATIENT
Start: 2024-05-27 | End: 2024-05-29 | Stop reason: HOSPADM

## 2024-05-27 RX ORDER — MIDAZOLAM HYDROCHLORIDE 2 MG/2ML
INJECTION, SOLUTION INTRAMUSCULAR; INTRAVENOUS AS NEEDED
Status: DISCONTINUED | OUTPATIENT
Start: 2024-05-27 | End: 2024-05-27 | Stop reason: SURG

## 2024-05-27 RX ORDER — PROPOFOL 10 MG/ML
VIAL (ML) INTRAVENOUS AS NEEDED
Status: DISCONTINUED | OUTPATIENT
Start: 2024-05-27 | End: 2024-05-27 | Stop reason: SURG

## 2024-05-27 RX ORDER — HYDROCODONE BITARTRATE AND ACETAMINOPHEN 5; 325 MG/1; MG/1
1 TABLET ORAL EVERY 6 HOURS PRN
Status: DISCONTINUED | OUTPATIENT
Start: 2024-05-27 | End: 2024-05-29 | Stop reason: HOSPADM

## 2024-05-27 RX ORDER — LIDOCAINE HYDROCHLORIDE 20 MG/ML
INJECTION, SOLUTION EPIDURAL; INFILTRATION; INTRACAUDAL; PERINEURAL AS NEEDED
Status: DISCONTINUED | OUTPATIENT
Start: 2024-05-27 | End: 2024-05-27 | Stop reason: SURG

## 2024-05-27 RX ORDER — ONDANSETRON 2 MG/ML
4 INJECTION INTRAMUSCULAR; INTRAVENOUS ONCE AS NEEDED
Status: DISCONTINUED | OUTPATIENT
Start: 2024-05-27 | End: 2024-05-27 | Stop reason: HOSPADM

## 2024-05-27 RX ADMIN — CLONIDINE HYDROCHLORIDE 0.1 MG: 0.1 TABLET ORAL at 09:34

## 2024-05-27 RX ADMIN — METOPROLOL SUCCINATE 50 MG: 50 TABLET, EXTENDED RELEASE ORAL at 16:30

## 2024-05-27 RX ADMIN — PROPOFOL 200 MG: 10 INJECTION, EMULSION INTRAVENOUS at 07:33

## 2024-05-27 RX ADMIN — FENTANYL CITRATE 50 MCG: 50 INJECTION, SOLUTION INTRAMUSCULAR; INTRAVENOUS at 07:33

## 2024-05-27 RX ADMIN — Medication 10 ML: at 21:10

## 2024-05-27 RX ADMIN — Medication 10 ML: at 09:00

## 2024-05-27 RX ADMIN — HYDROCHLOROTHIAZIDE 25 MG: 25 TABLET ORAL at 09:37

## 2024-05-27 RX ADMIN — FENTANYL CITRATE 50 MCG: 50 INJECTION, SOLUTION INTRAMUSCULAR; INTRAVENOUS at 07:50

## 2024-05-27 RX ADMIN — HYDROMORPHONE HYDROCHLORIDE 0.5 MG: 1 INJECTION, SOLUTION INTRAMUSCULAR; INTRAVENOUS; SUBCUTANEOUS at 07:56

## 2024-05-27 RX ADMIN — ONDANSETRON HYDROCHLORIDE 4 MG: 2 SOLUTION INTRAMUSCULAR; INTRAVENOUS at 07:39

## 2024-05-27 RX ADMIN — LIDOCAINE HYDROCHLORIDE 5 ML: 20 INJECTION, SOLUTION INTRAVENOUS at 07:33

## 2024-05-27 RX ADMIN — AMLODIPINE BESYLATE 10 MG: 10 TABLET ORAL at 09:34

## 2024-05-27 RX ADMIN — SODIUM CHLORIDE, POTASSIUM CHLORIDE, SODIUM LACTATE AND CALCIUM CHLORIDE: 600; 310; 30; 20 INJECTION, SOLUTION INTRAVENOUS at 07:25

## 2024-05-27 RX ADMIN — MIDAZOLAM HYDROCHLORIDE 2 MG: 1 INJECTION, SOLUTION INTRAMUSCULAR; INTRAVENOUS at 07:24

## 2024-05-27 RX ADMIN — ASPIRIN 325 MG: 325 TABLET, COATED ORAL at 17:53

## 2024-05-27 RX ADMIN — METRONIDAZOLE 500 MG: 500 INJECTION, SOLUTION INTRAVENOUS at 07:33

## 2024-05-27 RX ADMIN — CLONIDINE HYDROCHLORIDE 0.1 MG: 0.1 TABLET ORAL at 21:10

## 2024-05-27 RX ADMIN — LOSARTAN POTASSIUM 100 MG: 50 TABLET, FILM COATED ORAL at 09:36

## 2024-05-27 RX ADMIN — PANTOPRAZOLE SODIUM 40 MG: 40 INJECTION, POWDER, FOR SOLUTION INTRAVENOUS at 16:30

## 2024-05-27 RX ADMIN — DEXAMETHASONE SODIUM PHOSPHATE 4 MG: 4 INJECTION, SOLUTION INTRAMUSCULAR; INTRAVENOUS at 07:39

## 2024-05-27 RX ADMIN — TAMSULOSIN HYDROCHLORIDE 0.4 MG: 0.4 CAPSULE ORAL at 09:37

## 2024-05-27 NOTE — PROGRESS NOTES
[facility]   Hospitalist Progress Note  Date: 2024  Patient Name: Robert Valdez  : 1975  MRN: [id number]  Date of admission: 2024      Subjective   Subjective     Summary: 49 y.o. male with past medical history of hypertension, prediabetes, morbid obesity, and GERD presented to the ED with complaints of dark stools.  Patient states that for the last 2 days he has been having bowel movements with dark stools along with chills, diarrhea, and generalized weakness.  Patient admits to relatively heavy NSAID use taking ibuprofen 800 mg at least 5 times a week.  Patient was seen at this hospital 3 weeks ago with flank pain and was found to have renal stones.  Due to concerns he came to the ED for further evaluation.  In the ED patient's vitals were all within normal limits.  Labs showed that he had leukocytosis with anemia, reduced renal function, and positive fecal occult blood.  Remaining labs were relatively unremarkable including a normal INR and lactic acid.  CT abdomen showed left-sided hydronephrosis with obstructing 8 mm stone in the left ureter.  Patient denied having any significant flank pain as well as fevers, headaches, focal weakness, chest pain, palpitation, shortness of breath, cough, abdominal pain, nausea, vomiting, diarrhea, constipation, dysuria, hematuria, or anxiety.  Patient admitted for further evaluation and treatment     Interval Followup:   Patient underwent upper and lower endoscopy which showed colonic ulcers.  Had an episode of gross hematochezia today but his second bowel movement denies any blood.  Had a lengthy discussion with him about risk and benefits regarding his anticoagulation with DAPT.        Objective   Objective     Vitals:   Temp:  [97.9 °F (36.6 °C)-99 °F (37.2 °C)] 97.9 °F (36.6 °C)  Heart Rate:  [70-96] 80  Resp:  [16-18] 18  BP: (111-144)/(66-86) 144/80  Physical Exam    Constitutional: Awake, alert, no acute distress   Respiratory: Clear to  auscultation bilaterally, nonlabored respirations    Cardiovascular: RRR, no murmurs, rubs, or gallops, palpable pedal pulses bilaterally   Gastrointestinal: Positive bowel sounds, soft, nontender, nondistended   Psychiatric: Appropriate affect, cooperative   Neurologic: Oriented x 3, speech clear          Assessment & Plan   Assessment / Plan     Assessment/Plan:  GI bleed  Left ureteral stone  Hypertension  History of intracranial aneurysm with coiling and stenting in January of this year approximately 5 months ago     Planning cystoscopy with left ureteroscopy hopefully 5/27  DAPT currently being held.  Patient is actively bleeding from colonic ulcers.  Neurology consulted.  May need to get neurosurgery input as well.  Gastroenterology consulted recommending 3 days minimum off of DAPT  Avoid NSAID use  CBC in am for surveillance of any acute blood loss or thrombocytopenia  Metabolic panel in the morning to evaluate electrolytes and renal function  Reviewed today's labs: Cr 1.65  Discussed with RN  Risk of primary complaint: patient is at significant risk of morbidity if primary complaint is not treated especially considering the patient's comorbidities.  Discussed plan with RN.    DVT prophylaxis:  Mechanical DVT prophylaxis orders are signed and held. Mechanical DVT prophylaxis orders are present.    CODE STATUS:   Level Of Support Discussed With: Patient  Code Status (Patient has no pulse and is not breathing): CPR (Attempt to Resuscitate)  Medical Interventions (Patient has pulse or is breathing): Full Support

## 2024-05-27 NOTE — PLAN OF CARE
Goal Outcome Evaluation:  Plan of Care Reviewed With: patient, spouse        Progress: no change  Outcome Evaluation: pt remains aox4 on room air. VSS during shift. Pt advanced to a regular diet this shift, tolerating well. Pt had urteral stent placed this morning, complained of feeling like constantly having to urinate, pt educated that those symptoms would subside throughout the day. Pt states not having those symptoms anymore. Pt had blood in urine after procedure, no more blood noted this past time with urination. Left knee is discomforting, MD aware and stated going to have an xray done, no new orders at this time. Pt ambulated around the unit a few times this shift. No complaints of pain at this time, resting in bed eating dinner.

## 2024-05-27 NOTE — OP NOTE
URETEROSCOPY LASER LITHOTRIPSY WITH STENT INSERTION  Procedure Report    Patient Name:  Robert Valdez  YOB: 1975    Date of Surgery:  5/27/2024      Pre-op Diagnosis:   Left ureteral stone [N20.1]       Postop diagnosis:    Same    Procedure/CPT® Codes:      Procedure(s):      Cystoscopy    left ureteroscopy with laser and basket extraction of stone  Left ureteral left ureteral stent placement.  6 x 26 with no string    Staff:  Surgeon(s):  Omid Bardales MD         Anesthesia: General    Estimated Blood Loss: none    Implants:    Implant Name Type Inv. Item Serial No.  Lot No. LRB No. Used Action   STNT URETRL ASCERTA 6F 26CM - [id number] Stent STNT URETRL ASCERTA 6F 26CM  [employer] [id number] Left 1 Implanted       Specimen:          Specimens       ID Source Type Tests Collected By Collected At Frozen?    A Ureter, Left Calculus TISSUE PATHOLOGY EXAM  STONE ANALYSIS   Omid Bardales MD 5/27/24 0755 No    Description: left ureteral stone    This specimen was not marked as sent.                Findings:     3 cm prostate  Moderate trabeculations throughout the bladder, no pathology    All stones removed from the left side    Stent placed with no string      Complications: none    Description of Procedure:       After informed consent patient taken to the operating room.  Patient was laid supine and placed under general anesthesia by the anesthesia team.  At this point patient was placed in dorsal lithotomy position and prepped and draped in normal sterile fashion.  A multidisciplinary timeout was undertaken documenting the correct patient site and procedure.  At this point a 22 rigid cystoscope was placed into the urethra . Bladder was normal.  At this point a Glidewire was placed up the  left   ureter without any issue under fluoroscopic guidance.  I then placed a dual-lumen catheter and a stiff wire alongside the Glidewire under fluoroscopic guidance.  The  dual-lumen was removed and a ureteral access sheath was placed into the distal ureter without any problem.  I removed the obturator and wire and placed a flexible ureteroscope up the ueter.  The stone was identified.  Stone was lasered into multiple small fragments with a 272 µm laser fiber and then these pieces were basketed out with a no tip nitinol basket.  I then took the flexible ureteroscope up and check the rest of the ureter and the upper collecting system.  There were no further stones.  Brought the actual sheath out under direct vision and there was no further stones.      Left side was free of stones.  A 6 x 26 ureteral stent was then placed over the Glidewire through a rigid cystoscope without issue and had a good curl in the bladder under direct vision and a good curl in the left   renal pelvis under fluoroscopy.  Bladder was drained.  Patient tolerated the procedure well, he was taken to the postanesthesia care unit without issue.      Stent placed with no string            Omid Bardales MD     Date: 5/27/2024  Time: 08:06 EDT

## 2024-05-27 NOTE — PROGRESS NOTES
[facility]   Hospitalist Progress Note  Date: 2024  Patient Name: Robert Valdez  : 1975  MRN: [id number]  Date of admission: 2024      Subjective   Subjective     Summary: 49 y.o. male with past medical history of hypertension, prediabetes, morbid obesity, and GERD presented to the ED with complaints of dark stools.  Patient states that for the last 2 days he has been having bowel movements with dark stools along with chills, diarrhea, and generalized weakness.  Patient admits to relatively heavy NSAID use taking ibuprofen 800 mg at least 5 times a week.  Patient was seen at this hospital 3 weeks ago with flank pain and was found to have renal stones.  Due to concerns he came to the ED for further evaluation.  In the ED patient's vitals were all within normal limits.  Labs showed that he had leukocytosis with anemia, reduced renal function, and positive fecal occult blood.  Remaining labs were relatively unremarkable including a normal INR and lactic acid.  CT abdomen showed left-sided hydronephrosis with obstructing 8 mm stone in the left ureter.  Patient denied having any significant flank pain as well as fevers, headaches, focal weakness, chest pain, palpitation, shortness of breath, cough, abdominal pain, nausea, vomiting, diarrhea, constipation, dysuria, hematuria, or anxiety.  Patient admitted for further evaluation and treatment     Interval Followup:   Patient underwent upper and lower endoscopy which showed colonic ulcers.  Had an episode of gross hematochezia yesterday.  Had a normal bm today without blood.  Had a cystoscopy today with stone removal and stent placement.  Having some hematuria now after that but that is also clearing.        Objective   Objective     Vitals:   Temp:  [97.9 °F (36.6 °C)-98.8 °F (37.1 °C)] 98.7 °F (37.1 °C)  Heart Rate:  [55-90] 89  Resp:  [14-20] 20  BP: (119-156)/() 146/85  Flow (L/min):  [1-4] 1  Physical Exam    Constitutional: Awake,  alert, no acute distress   Respiratory: Clear to auscultation bilaterally, nonlabored respirations    Cardiovascular: RRR, no murmurs, rubs, or gallops, palpable pedal pulses bilaterally   Gastrointestinal: Positive bowel sounds, soft, nontender, nondistended   Psychiatric: Appropriate affect, cooperative   Neurologic: Oriented x 3, speech clear          Assessment & Plan   Assessment / Plan     Assessment/Plan:  GI bleed  Left ureteral stone  Hypertension  History of intracranial aneurysm with coiling and stenting in January of this year approximately 5 months ago     Planning cystoscopy with left ureteroscopy hopefully 5/27  DAPT currently being held.  Patient was actively bleeding from colonic ulcers.  Neurology consulted.  Restart DAPT with ASA starting now and Plavix starting in the am  Gastroenterology consulted recommending 3 days minimum off of DAPT  Avoid NSAID use  CBC in am for surveillance of any acute blood loss or thrombocytopenia  Metabolic panel in the morning to evaluate electrolytes and renal function  Reviewed today's labs: Cr 1.65  Discussed with RN  Risk of primary complaint: patient is at significant risk of morbidity if primary complaint is not treated especially considering the patient's comorbidities.  Discussed plan with RN.    DVT prophylaxis:  Mechanical DVT prophylaxis orders are present.    CODE STATUS:   Level Of Support Discussed With: Patient  Code Status (Patient has no pulse and is not breathing): CPR (Attempt to Resuscitate)  Medical Interventions (Patient has pulse or is breathing): Full Support

## 2024-05-27 NOTE — PLAN OF CARE
Goal Outcome Evaluation:  Plan of Care Reviewed With: patient        Progress: no change  Outcome Evaluation: Patient remains A&Ox4. VSS during shift. Patient has been NPO since 0000 for stent placement today. C/o epigastric discomfort at a 2/10 numeric pain scale - denies need for medication at this time. No new issues/needs at this time.

## 2024-05-28 LAB
ALBUMIN SERPL-MCNC: 3.7 G/DL (ref 3.5–5.2)
ALBUMIN/GLOB SERPL: 1.2 G/DL
ALP SERPL-CCNC: 78 U/L (ref 39–117)
ALT SERPL W P-5'-P-CCNC: 16 U/L (ref 1–41)
ANION GAP SERPL CALCULATED.3IONS-SCNC: 9.9 MMOL/L (ref 5–15)
AST SERPL-CCNC: 13 U/L (ref 1–40)
BILIRUB SERPL-MCNC: 0.3 MG/DL (ref 0–1.2)
BUN SERPL-MCNC: 21 MG/DL (ref 6–20)
BUN/CREAT SERPL: 12.1 (ref 7–25)
CALCIUM SPEC-SCNC: 9.2 MG/DL (ref 8.6–10.5)
CHLORIDE SERPL-SCNC: 97 MMOL/L (ref 98–107)
CO2 SERPL-SCNC: 29.1 MMOL/L (ref 22–29)
CREAT SERPL-MCNC: 1.74 MG/DL (ref 0.76–1.27)
DEPRECATED RDW RBC AUTO: 40.3 FL (ref 37–54)
EGFRCR SERPLBLD CKD-EPI 2021: 47.5 ML/MIN/1.73
ERYTHROCYTE [DISTWIDTH] IN BLOOD BY AUTOMATED COUNT: 13.2 % (ref 12.3–15.4)
GLOBULIN UR ELPH-MCNC: 3 GM/DL
GLUCOSE SERPL-MCNC: 122 MG/DL (ref 65–99)
HCT VFR BLD AUTO: 35.3 % (ref 37.5–51)
HGB BLD-MCNC: 12 G/DL (ref 13–17.7)
HOLD SPECIMEN: NORMAL
MCH RBC QN AUTO: 28.4 PG (ref 26.6–33)
MCHC RBC AUTO-ENTMCNC: 34 G/DL (ref 31.5–35.7)
MCV RBC AUTO: 83.6 FL (ref 79–97)
PLATELET # BLD AUTO: 246 10*3/MM3 (ref 140–450)
PMV BLD AUTO: 10.7 FL (ref 6–12)
POTASSIUM SERPL-SCNC: 3.4 MMOL/L (ref 3.5–5.2)
PROT SERPL-MCNC: 6.7 G/DL (ref 6–8.5)
RBC # BLD AUTO: 4.22 10*6/MM3 (ref 4.14–5.8)
SODIUM SERPL-SCNC: 136 MMOL/L (ref 136–145)
WBC NRBC COR # BLD AUTO: 10.93 10*3/MM3 (ref 3.4–10.8)

## 2024-05-28 PROCEDURE — 80053 COMPREHEN METABOLIC PANEL: CPT | Performed by: FAMILY MEDICINE

## 2024-05-28 PROCEDURE — 85027 COMPLETE CBC AUTOMATED: CPT | Performed by: UROLOGY

## 2024-05-28 PROCEDURE — 99231 SBSQ HOSP IP/OBS SF/LOW 25: CPT | Performed by: UROLOGY

## 2024-05-28 RX ORDER — POLYETHYLENE GLYCOL 3350 17 G/17G
17 POWDER, FOR SOLUTION ORAL DAILY PRN
Status: DISCONTINUED | OUTPATIENT
Start: 2024-05-28 | End: 2024-05-29 | Stop reason: HOSPADM

## 2024-05-28 RX ORDER — BISACODYL 5 MG/1
5 TABLET, DELAYED RELEASE ORAL DAILY PRN
Status: DISCONTINUED | OUTPATIENT
Start: 2024-05-28 | End: 2024-05-29 | Stop reason: HOSPADM

## 2024-05-28 RX ORDER — AMOXICILLIN 250 MG
2 CAPSULE ORAL 2 TIMES DAILY PRN
Status: DISCONTINUED | OUTPATIENT
Start: 2024-05-28 | End: 2024-05-29 | Stop reason: HOSPADM

## 2024-05-28 RX ORDER — BISACODYL 10 MG
10 SUPPOSITORY, RECTAL RECTAL DAILY PRN
Status: DISCONTINUED | OUTPATIENT
Start: 2024-05-28 | End: 2024-05-29 | Stop reason: HOSPADM

## 2024-05-28 RX ADMIN — Medication 10 ML: at 21:51

## 2024-05-28 RX ADMIN — TAMSULOSIN HYDROCHLORIDE 0.4 MG: 0.4 CAPSULE ORAL at 08:21

## 2024-05-28 RX ADMIN — PANTOPRAZOLE SODIUM 40 MG: 40 INJECTION, POWDER, FOR SOLUTION INTRAVENOUS at 07:27

## 2024-05-28 RX ADMIN — ASPIRIN 325 MG: 325 TABLET, COATED ORAL at 08:33

## 2024-05-28 RX ADMIN — AMLODIPINE BESYLATE 10 MG: 10 TABLET ORAL at 08:21

## 2024-05-28 RX ADMIN — CLOPIDOGREL BISULFATE 75 MG: 75 TABLET ORAL at 08:22

## 2024-05-28 RX ADMIN — CLONIDINE HYDROCHLORIDE 0.1 MG: 0.1 TABLET ORAL at 21:51

## 2024-05-28 RX ADMIN — METOPROLOL SUCCINATE 50 MG: 50 TABLET, EXTENDED RELEASE ORAL at 16:49

## 2024-05-28 RX ADMIN — PANTOPRAZOLE SODIUM 40 MG: 40 INJECTION, POWDER, FOR SOLUTION INTRAVENOUS at 16:49

## 2024-05-28 RX ADMIN — Medication 10 ML: at 08:22

## 2024-05-28 RX ADMIN — LOSARTAN POTASSIUM 100 MG: 50 TABLET, FILM COATED ORAL at 08:22

## 2024-05-28 RX ADMIN — CLONIDINE HYDROCHLORIDE 0.1 MG: 0.1 TABLET ORAL at 08:21

## 2024-05-28 RX ADMIN — HYDROCHLOROTHIAZIDE 25 MG: 25 TABLET ORAL at 08:22

## 2024-05-28 NOTE — PROGRESS NOTES
[facility]   Urology Progress Note    Patient Name: Robert Valdez  : 1975  MRN: [id number]  Primary Care Physician:  Ketty Toussaint, ADRIAN  Date of admission: 2024    Subjective   Subjective     Pain this a.m.    Some gross hematuria with clots but clearing      Objective   Objective     Vitals:   Temp:  [97.9 °F (36.6 °C)-98.8 °F (37.1 °C)] 97.9 °F (36.6 °C)  Heart Rate:  [55-89] 69  Resp:  [14-20] 20  BP: (119-156)/() 121/76  Flow (L/min):  [1-4] 1  Physical Exam     Alert and oriented x3  No acute distress  Unlabored respirations  Nontender/nondistended       Result Review    Result Review:  I have personally reviewed the results from the time of this admission to 2024 07:12 EDT and agree with these findings:  []  Laboratory  []  Microbiology  []  Radiology  []  EKG/Telemetry   []  Cardiology/Vascular   []  Pathology  []  Old records  []  Other:      Assessment & Plan   Assessment / Plan     Brief Patient Summary:  Robert Valdez is a 49 y.o. male          Active Hospital Problems:  Active Hospital Problems    Diagnosis     **GI bleed     Left ureteral stone     HTN (hypertension)     Blood in stool     Acute diarrhea     Pre-syncope     NSAIDs adverse reaction, initial encounter        Postop day 1   left ureteroscopy with stent      Okay to resume anticoagulation as far as urology is concerned    My office will contact for stent removal, patient aware that the stent cannot stay in place or cause loss of damage of kidney.    Call with questions              Electronically signed by Omid Bardales MD, 24, 7:12 AM EDT.

## 2024-05-28 NOTE — CONSULTS
SW met with pt to discuss ACP. SW assisted pt with the completion of an AD. No questions/concerns noted at this time. Copy sent to Medical Records and provided to pt.    Healthcare Surrogate: Izabella Valdez (mother) [phone]

## 2024-05-28 NOTE — PLAN OF CARE
Goal Outcome Evaluation:              Outcome Evaluation: pt a/ox4, ra, denies any pain or discomfort at this time. pt previously stated burning sensation with urination as well as hematuria but now states both have improved. denies any bloody stool at this time. pt ambulated multiple laps in hallway

## 2024-05-28 NOTE — PROGRESS NOTES
[facility]   Hospitalist Progress Note  Date: 2024  Patient Name: Robert Valdez  : 1975  MRN: [id number]  Date of admission: 2024      Subjective   Subjective     Summary: 49 y.o. male with past medical history of hypertension, prediabetes, morbid obesity, and GERD presented to the ED with complaints of dark stools.  Patient states that for the last 2 days he has been having bowel movements with dark stools along with chills, diarrhea, and generalized weakness.  Patient admits to relatively heavy NSAID use taking ibuprofen 800 mg at least 5 times a week.  Patient was seen at this hospital 3 weeks ago with flank pain and was found to have renal stones.  Due to concerns he came to the ED for further evaluation.  In the ED patient's vitals were all within normal limits.  Labs showed that he had leukocytosis with anemia, reduced renal function, and positive fecal occult blood.  Remaining labs were relatively unremarkable including a normal INR and lactic acid.  CT abdomen showed left-sided hydronephrosis with obstructing 8 mm stone in the left ureter.  Patient denied having any significant flank pain as well as fevers, headaches, focal weakness, chest pain, palpitation, shortness of breath, cough, abdominal pain, nausea, vomiting, diarrhea, constipation, dysuria, hematuria, or anxiety.  Patient admitted for further evaluation and treatment     Interval Followup:   Patient underwent upper and lower endoscopy which showed colonic ulcers.  Had an episode of gross hematochezia 2024.  Had a normal bm 2024 without blood.  Had a cystoscopy 2024 with stone removal and stent placement.  Having some hematuria now after that but that is also clearing.  No more bloody bowel movements.  Had a normal bowel movement today 2024.      Objective   Objective     Vitals:   Temp:  [97.9 °F (36.6 °C)-98.5 °F (36.9 °C)] 98 °F (36.7 °C)  Heart Rate:  [67-80] 78  Resp:  [18-20] 18  BP:  (121-138)/(74-85) 138/85  Physical Exam    Constitutional: Awake, alert, no acute distress   Respiratory: Clear to auscultation bilaterally, nonlabored respirations    Cardiovascular: RRR, no murmurs, rubs, or gallops, palpable pedal pulses bilaterally   Gastrointestinal: Positive bowel sounds, soft, nontender, nondistended   Psychiatric: Appropriate affect, cooperative   Neurologic: Oriented x 3, speech clear          Assessment & Plan   Assessment / Plan     Assessment/Plan:  GI bleed  Left ureteral stone  Hypertension  History of intracranial aneurysm with coiling and stenting in January of this year approximately 5 months ago  Left knee pain.  Mechanical injury     Planning cystoscopy with left ureteroscopy hopefully 5/27  DAPT was being held.  Patient was actively bleeding from colonic ulcers.  Neurology consulted.  Restart antiplatelet therapy with Plavix starting 5/20/2024  Gastroenterology recommending antiplatelet monotherapy until we can be sure he is not bleeding  Awaiting results of biopsy from the colon  Obtain mesenteric artery ultrasound to determine patency of mesenteric blood supply  Avoid NSAID use  CBC in am for surveillance of any acute blood loss or thrombocytopenia  Metabolic panel in the morning to evaluate electrolytes and renal function  Reviewed today's labs: Cr 1.74  Discussed with GI  Knee x-ray  Risk of primary complaint: patient is at significant risk of morbidity if primary complaint is not treated especially considering the patient's comorbidities.  Discussed plan with RN.    DVT prophylaxis:  Mechanical DVT prophylaxis orders are present.    CODE STATUS:   Level Of Support Discussed With: Patient  Code Status (Patient has no pulse and is not breathing): CPR (Attempt to Resuscitate)  Medical Interventions (Patient has pulse or is breathing): Full Support

## 2024-05-28 NOTE — PLAN OF CARE
Goal Outcome Evaluation:  Plan of Care Reviewed With: patient        Progress: improving  Outcome Evaluation: Alert and oriented x4. No complaints of pain. Up ad shannon, ambulates throughout hallway frequently. No new issues at this time.

## 2024-05-29 ENCOUNTER — APPOINTMENT (OUTPATIENT)
Facility: HOSPITAL | Age: 49
End: 2024-05-29
Payer: MEDICAID

## 2024-05-29 VITALS
RESPIRATION RATE: 18 BRPM | BODY MASS INDEX: 42.55 KG/M2 | HEIGHT: 70 IN | TEMPERATURE: 98.6 F | OXYGEN SATURATION: 99 % | DIASTOLIC BLOOD PRESSURE: 74 MMHG | WEIGHT: 297.18 LBS | SYSTOLIC BLOOD PRESSURE: 124 MMHG | HEART RATE: 95 BPM

## 2024-05-29 LAB
ALBUMIN SERPL-MCNC: 3.6 G/DL (ref 3.5–5.2)
ALBUMIN/GLOB SERPL: 1.2 G/DL
ALP SERPL-CCNC: 72 U/L (ref 39–117)
ALT SERPL W P-5'-P-CCNC: 17 U/L (ref 1–41)
ANION GAP SERPL CALCULATED.3IONS-SCNC: 9.2 MMOL/L (ref 5–15)
AST SERPL-CCNC: 15 U/L (ref 1–40)
BASOPHILS # BLD AUTO: 0.04 10*3/MM3 (ref 0–0.2)
BASOPHILS NFR BLD AUTO: 0.6 % (ref 0–1.5)
BH CV VAS SMA AORTA EDV: 37 CM/S
BH CV VAS SMA AORTA PSV: 121 CM/S
BH CV VAS SMA CELIAC DIST EDV: 40 CM/S
BH CV VAS SMA CELIAC DIST PSV: 135 CM/S
BH CV VAS SMA CELIAC MID EDV: 33 CM/S
BH CV VAS SMA CELIAC MID PSV: 137 CM/S
BH CV VAS SMA CELIAC ORIGIN EDV: 41 CM/S
BH CV VAS SMA CELIAC ORIGIN PSV: 159 CM/S
BH CV VAS SMA CELIAC PROX EDV: 38 CM/S
BH CV VAS SMA CELIAC PROX PSV: 155 CM/S
BH CV VAS SMA HEPATIC EDV: 27 CM/S
BH CV VAS SMA HEPATIC PSV: 86 CM/S
BH CV VAS SMA IMA EDV: 18 CM/S
BH CV VAS SMA IMA PSV: 64 CM/S
BH CV VAS SMA ORIGIN EDV: 18 CM/S
BH CV VAS SMA ORIGIN PSV: 185 CM/S
BH CV VAS SMA SMA DIST EDV: 12 CM/S
BH CV VAS SMA SMA DIST PSV: 99 CM/S
BH CV VAS SMA SMA MID EDV: 20 CM/S
BH CV VAS SMA SMA MID PSV: 132.7 CM/S
BH CV VAS SMA SMA PROX EDV: 19 CM/S
BH CV VAS SMA SMA PROX PSV: 144.3 CM/S
BH CV VAS SMA SPLENIC EDV: 39 CM/S
BH CV VAS SMA SPLENIC PSV: 109 CM/S
BILIRUB SERPL-MCNC: 0.3 MG/DL (ref 0–1.2)
BUN SERPL-MCNC: 23 MG/DL (ref 6–20)
BUN/CREAT SERPL: 13.9 (ref 7–25)
CALCIUM SPEC-SCNC: 9 MG/DL (ref 8.6–10.5)
CHLORIDE SERPL-SCNC: 97 MMOL/L (ref 98–107)
CO2 SERPL-SCNC: 29.8 MMOL/L (ref 22–29)
CREAT SERPL-MCNC: 1.65 MG/DL (ref 0.76–1.27)
CYTO UR: NORMAL
DEPRECATED RDW RBC AUTO: 41.3 FL (ref 37–54)
EGFRCR SERPLBLD CKD-EPI 2021: 50.6 ML/MIN/1.73
EOSINOPHIL # BLD AUTO: 0.18 10*3/MM3 (ref 0–0.4)
EOSINOPHIL NFR BLD AUTO: 2.6 % (ref 0.3–6.2)
ERYTHROCYTE [DISTWIDTH] IN BLOOD BY AUTOMATED COUNT: 13.6 % (ref 12.3–15.4)
GLOBULIN UR ELPH-MCNC: 3.1 GM/DL
GLUCOSE SERPL-MCNC: 112 MG/DL (ref 65–99)
HCT VFR BLD AUTO: 37.3 % (ref 37.5–51)
HGB BLD-MCNC: 12.5 G/DL (ref 13–17.7)
IMM GRANULOCYTES # BLD AUTO: 0.08 10*3/MM3 (ref 0–0.05)
IMM GRANULOCYTES NFR BLD AUTO: 1.2 % (ref 0–0.5)
LAB AP CASE REPORT: NORMAL
LAB AP CASE REPORT: NORMAL
LAB AP CLINICAL INFORMATION: NORMAL
LAB AP CLINICAL INFORMATION: NORMAL
LYMPHOCYTES # BLD AUTO: 1.33 10*3/MM3 (ref 0.7–3.1)
LYMPHOCYTES NFR BLD AUTO: 19.2 % (ref 19.6–45.3)
MCH RBC QN AUTO: 28 PG (ref 26.6–33)
MCHC RBC AUTO-ENTMCNC: 33.5 G/DL (ref 31.5–35.7)
MCV RBC AUTO: 83.4 FL (ref 79–97)
MONOCYTES # BLD AUTO: 0.7 10*3/MM3 (ref 0.1–0.9)
MONOCYTES NFR BLD AUTO: 10.1 % (ref 5–12)
NEUTROPHILS NFR BLD AUTO: 4.6 10*3/MM3 (ref 1.7–7)
NEUTROPHILS NFR BLD AUTO: 66.3 % (ref 42.7–76)
NRBC BLD AUTO-RTO: 0 /100 WBC (ref 0–0.2)
PATH REPORT.FINAL DX SPEC: NORMAL
PATH REPORT.FINAL DX SPEC: NORMAL
PATH REPORT.GROSS SPEC: NORMAL
PATH REPORT.GROSS SPEC: NORMAL
PLATELET # BLD AUTO: 256 10*3/MM3 (ref 140–450)
PMV BLD AUTO: 10.5 FL (ref 6–12)
POTASSIUM SERPL-SCNC: 3.3 MMOL/L (ref 3.5–5.2)
PROT SERPL-MCNC: 6.7 G/DL (ref 6–8.5)
RBC # BLD AUTO: 4.47 10*6/MM3 (ref 4.14–5.8)
SODIUM SERPL-SCNC: 136 MMOL/L (ref 136–145)
WBC NRBC COR # BLD AUTO: 6.93 10*3/MM3 (ref 3.4–10.8)

## 2024-05-29 PROCEDURE — 93975 VASCULAR STUDY: CPT

## 2024-05-29 PROCEDURE — 93975 VASCULAR STUDY: CPT | Performed by: SURGERY

## 2024-05-29 PROCEDURE — 80053 COMPREHEN METABOLIC PANEL: CPT | Performed by: FAMILY MEDICINE

## 2024-05-29 PROCEDURE — 85025 COMPLETE CBC W/AUTO DIFF WBC: CPT | Performed by: FAMILY MEDICINE

## 2024-05-29 RX ORDER — TAMSULOSIN HYDROCHLORIDE 0.4 MG/1
0.4 CAPSULE ORAL DAILY
Qty: 30 CAPSULE | Refills: 0 | Status: SHIPPED | OUTPATIENT
Start: 2024-05-30

## 2024-05-29 RX ORDER — ASPIRIN 81 MG/1
81 TABLET ORAL DAILY
Qty: 30 TABLET | Refills: 0 | Status: SHIPPED | OUTPATIENT
Start: 2024-05-29

## 2024-05-29 RX ORDER — PANTOPRAZOLE SODIUM 40 MG/1
40 TABLET, DELAYED RELEASE ORAL
Status: DISCONTINUED | OUTPATIENT
Start: 2024-05-30 | End: 2024-05-29 | Stop reason: HOSPADM

## 2024-05-29 RX ADMIN — AMLODIPINE BESYLATE 10 MG: 10 TABLET ORAL at 09:19

## 2024-05-29 RX ADMIN — TAMSULOSIN HYDROCHLORIDE 0.4 MG: 0.4 CAPSULE ORAL at 09:19

## 2024-05-29 RX ADMIN — LOSARTAN POTASSIUM 100 MG: 50 TABLET, FILM COATED ORAL at 09:18

## 2024-05-29 RX ADMIN — CLONIDINE HYDROCHLORIDE 0.1 MG: 0.1 TABLET ORAL at 09:18

## 2024-05-29 RX ADMIN — Medication 10 ML: at 09:19

## 2024-05-29 RX ADMIN — CLOPIDOGREL BISULFATE 75 MG: 75 TABLET ORAL at 09:19

## 2024-05-29 RX ADMIN — HYDROCHLOROTHIAZIDE 25 MG: 25 TABLET ORAL at 09:19

## 2024-05-29 RX ADMIN — PANTOPRAZOLE SODIUM 40 MG: 40 INJECTION, POWDER, FOR SOLUTION INTRAVENOUS at 06:36

## 2024-05-29 NOTE — DISCHARGE SUMMARY
Physician Discharge Summary  Patient Identification  PATIENT IDENTIFICATION    Name: Robert Valdez  :  1975  MRN: [id number]    Admit date: 2024    Discharge date: 2024     Admitting Physician: Rickey Murrieta MD     Discharge Physician: Malachi Holcomb MD     Admission Diagnoses: GI bleed [K92.2]  Acute upper GI bleed [K92.2]  Acute generalized abdominal pain [R10.84]  Left ureteral stone [N20.1]    Hospital Problems:   Principal Problem:  GI bleed   Active Problems:  Problems Addressed this Visit          Gastrointestinal Abdominal     Blood in stool - Primary    Relevant Orders    Case request (Completed)    Tissue Pathology Exam    Acute diarrhea    Relevant Orders    Case request (Completed)    Tissue Pathology Exam       Genitourinary and Reproductive     Left ureteral stone    Relevant Medications    hydroCHLOROthiazide tablet 25 mg    Other Relevant Orders    Case request (Completed)    Tissue Pathology Exam (Completed)    STONE ANALYSIS - Calculus, Ureter, Left       Symptoms and Signs    Pre-syncope    Relevant Orders    Case request (Completed)    Tissue Pathology Exam       Other    NSAIDs adverse reaction, initial encounter    Relevant Orders    Case request (Completed)    Tissue Pathology Exam     Other Visit Diagnoses       Acute generalized abdominal pain        Acute upper GI bleed        Bloody diarrhea              Diagnoses         Codes Comments    Blood in stool    -  Primary ICD-10-CM: K92.1  ICD-9-CM: 578.1     Acute generalized abdominal pain     ICD-10-CM: R10.84  ICD-9-CM: 789.07, 338.19     Acute upper GI bleed     ICD-10-CM: K92.2  ICD-9-CM: 578.9     Left ureteral stone     ICD-10-CM: N20.1  ICD-9-CM: 592.1     Bloody diarrhea     ICD-10-CM: R19.7  ICD-9-CM: 787.91     Acute diarrhea     ICD-10-CM: R19.7  ICD-9-CM: 787.91     Pre-syncope     ICD-10-CM: R55  ICD-9-CM: 780.2     NSAIDs adverse reaction, initial encounter     ICD-10-CM: T39.395A  ICD-9-CM:  "E935.8              Discharged Condition: stable    Consults: IP CONSULT TO GASTROENTEROLOGY  IP CONSULT TO HOSPITALIST  IP CONSULT TO UROLOGY  IP CONSULT TO ADVANCE CARE PLANNING  IP CONSULT TO NEUROLOGY    Imaging:   Imaging Results (Last 24 Hours)       ** No results found for the last 24 hours. **            Labs:   Lab Results (last 24 hours)       Procedure Component Value Units Date/Time    Tissue Pathology Exam [[id number]] Collected: 05/27/24 0755    Specimen: Calculus from Ureter, Left Updated: 05/29/24 0828     Case Report --     Surgical Pathology Report                         Case: [id number]                                  Authorizing Provider:  Omid Bardales MD      Collected:           05/27/2024 07:55 AM          Ordering Location:     [facility] MAIN Received:            05/28/2024 12:27 PM                                 OR                                                                           Pathologist:           Rosa Elena Harrington DO                                                       Specimen:    Ureter, Left, left ureteral stone                                                           Clinical Information --     Left ureteral stone         Final Diagnosis --     Left ureteral stone, removal:   - Stone fragments, sent for chemical analysis (gross only diagnosis)          Gross Description --     1. Ureter, Left.  The specimen is received fresh labeled \" left ureteral stone\" and consists of a 0.7 x 0.7 x 0.7 cm aggregate of brown, friable renal stones.  The specimen is submitted en toto for chemical analysis following gross examination by staff pathologist Rosa Elena Harrington.   [facility]      Comprehensive Metabolic Panel [[id number]]  (Abnormal) Collected: 05/29/24 0620    Specimen: Blood from Arm, Right Updated: 05/29/24 0705     Glucose 112 mg/dL      BUN 23 mg/dL      Creatinine 1.65 mg/dL      Sodium 136 mmol/L      Potassium 3.3 mmol/L      Comment: Slight hemolysis " detected by analyzer. Result may be falsely elevated.        Chloride 97 mmol/L      CO2 29.8 mmol/L      Calcium 9.0 mg/dL      Total Protein 6.7 g/dL      Albumin 3.6 g/dL      ALT (SGPT) 17 U/L      AST (SGOT) 15 U/L      Alkaline Phosphatase 72 U/L      Total Bilirubin 0.3 mg/dL      Globulin 3.1 gm/dL      A/G Ratio 1.2 g/dL      BUN/Creatinine Ratio 13.9     Anion Gap 9.2 mmol/L      eGFR 50.6 mL/min/1.73     Narrative:      GFR Normal >60  Chronic Kidney Disease <60  Kidney Failure <15      CBC & Differential [[id number]]  (Abnormal) Collected: 05/29/24 0620    Specimen: Blood from Arm, Right Updated: 05/29/24 0646    Narrative:      The following orders were created for panel order CBC & Differential.  Procedure                               Abnormality         Status                     ---------                               -----------         ------                     CBC Auto Differential[[id number]]        Abnormal            Final result                 Please view results for these tests on the individual orders.    CBC Auto Differential [[id number]]  (Abnormal) Collected: 05/29/24 0620    Specimen: Blood from Arm, Right Updated: 05/29/24 0646     WBC 6.93 10*3/mm3      RBC 4.47 10*6/mm3      Hemoglobin 12.5 g/dL      Hematocrit 37.3 %      MCV 83.4 fL      MCH 28.0 pg      MCHC 33.5 g/dL      RDW 13.6 %      RDW-SD 41.3 fl      MPV 10.5 fL      Platelets 256 10*3/mm3      Neutrophil % 66.3 %      Lymphocyte % 19.2 %      Monocyte % 10.1 %      Eosinophil % 2.6 %      Basophil % 0.6 %      Immature Grans % 1.2 %      Neutrophils, Absolute 4.60 10*3/mm3      Lymphocytes, Absolute 1.33 10*3/mm3      Monocytes, Absolute 0.70 10*3/mm3      Eosinophils, Absolute 0.18 10*3/mm3      Basophils, Absolute 0.04 10*3/mm3      Immature Grans, Absolute 0.08 10*3/mm3      nRBC 0.0 /100 WBC               Hospital Course:   49 y.o. male with past medical history of hypertension, prediabetes, morbid obesity, and GERD  presented to the ED with complaints of dark stools. Patient admits to relatively heavy NSAID use taking ibuprofen 800 mg at least 5 times a week. Labs showed that he had leukocytosis with anemia, reduced renal function, and positive fecal occult blood. CT abdomen showed left-sided hydronephrosis with obstructing 8 mm stone in the left ureter. He underwent EGD/Cscope that showed erythematous gastric mucosa and linear ulcerated mucosa from rectum through descending colon and transverse colon. He had ulcerated mucosa in the sigmoid colon as well with sloughing and deep laceration treated with hemoclips. Pathology was pending at time of d/c. Plavix was reintroduced and he was tolerating prior to d/c and he was told to resume aspirin in 2 days if no bleeding. He will f/u with GI in clinic. U/s of his SMA/RAJWINDER showed no occlusions    He also underwent ureteroscopy and stent placement on 5/27. He will f/u with Dr Bardales in 1-2 weeks.     Discharge Exam:  Gen: up in bed, in NAD  CV:  RRR, no m/r/g, no peripheral edema  Resp: CTAB, no increase work of breathing  Abd: soft, NT, ND, bs present  Neuro: moves all 4 ext, following commands  Ext: no clubbing, cyanosis or edema  Psych: AAOx3, pleasant affect    Disposition: Home    Patient Discharge Medications:      Discharge Medications        New Medications        Instructions Start Date   tamsulosin 0.4 MG capsule 24 hr capsule  Commonly known as: FLOMAX   0.4 mg, Oral, Daily   Start Date: May 30, 2024            Changes to Medications        Instructions Start Date   aspirin 81 MG EC tablet  What changed:   medication strength  how much to take   81 mg, Oral, Daily             Continue These Medications        Instructions Start Date   amLODIPine 10 MG tablet  Commonly known as: NORVASC   1 tablet, Oral, Daily      cloNIDine 0.1 MG tablet  Commonly known as: CATAPRES   1 tablet, Oral, 2 Times Daily      clopidogrel 75 MG tablet  Commonly known as: PLAVIX   1 tablet, Oral,  Daily      hydrALAZINE 25 MG tablet  Commonly known as: APRESOLINE   25 mg, Oral, 2 Times Daily      losartan-hydrochlorothiazide 100-25 MG per tablet  Commonly known as: HYZAAR   1 tablet, Oral, Daily      metoprolol succinate XL 50 MG 24 hr tablet  Commonly known as: TOPROL-XL   50 mg, Oral, Every Evening      Ozempic (0.25 or 0.5 MG/DOSE) 2 MG/3ML solution pen-injector  Generic drug: Semaglutide(0.25 or 0.5MG/DOS)   0.5 mg, Subcutaneous, Weekly      pantoprazole 40 MG EC tablet  Commonly known as: PROTONIX   1 tablet, Oral, Daily      polyethylene glycol 17 GM/SCOOP powder  Commonly known as: MIRALAX   17 g, Oral, Daily      vitamin D 1.25 MG (44430 UT) capsule capsule  Commonly known as: ERGOCALCIFEROL   1 tablet, Oral, Weekly              Follow-up Information       Omid Bardales MD Follow up in 10 day(s).    Specialty: Urology  Why: My office will contact for cystoscopy with stent pull in office in about 10 days  Contact information:  [address] [place] [address] [phone]               Ketty Toussaint APRN .    Specialties: Nurse Practitioner, Radiation Oncology  Contact information:  [address] [place] [address] [phone]                                 Signed:  Rubén Holcomb MD  Hospitalist Group  5/29/2024  12:18 EDT      I spent 34 minutes arranging and coordinating discharge and reviewing medications with majority of time spent counseling patient

## 2024-05-29 NOTE — PLAN OF CARE
Goal Outcome Evaluation:  Plan of Care Reviewed With: patient           Outcome Evaluation: Patient A/Ox4. No complaints of pain. Duplex scan completed. On room air. Up ad shannon. No other issues/needs at this time. Discharging home.

## 2024-05-29 NOTE — PLAN OF CARE
Goal Outcome Evaluation:           Progress: no change  Outcome Evaluation: Patient a&ox4. No complaints or issues per the patient this shift as of this time. NPO at midnight for duplex scan today,

## 2024-05-30 ENCOUNTER — READMISSION MANAGEMENT (OUTPATIENT)
Dept: CALL CENTER | Facility: HOSPITAL | Age: 49
End: 2024-05-30
Payer: MEDICAID

## 2024-05-30 NOTE — ADDENDUM NOTE
Addendum  created 05/30/24 1012 by Sweta Garvin MD    [facility] saved

## 2024-05-30 NOTE — OUTREACH NOTE
Prep Survey      Flowsheet Row Responses   [facility] discharged from? [facility]   Is LACE score < 7 ? No   Eligibility Readm Mgmt   Discharge diagnosis GI Bleed   Does the patient have one of the following disease processes/diagnoses(primary or secondary)? Other   Does the patient have Home health ordered? No   Is there a DME ordered? No   Medication alerts for this patient see avs   Prep survey completed? Yes            Lizzie ASHLEY - Registered Nurse

## 2024-06-02 ENCOUNTER — APPOINTMENT (OUTPATIENT)
Dept: CT IMAGING | Facility: HOSPITAL | Age: 49
End: 2024-06-02
Payer: MEDICAID

## 2024-06-02 ENCOUNTER — HOSPITAL ENCOUNTER (EMERGENCY)
Facility: HOSPITAL | Age: 49
Discharge: HOME | End: 2024-06-02
Attending: EMERGENCY MEDICINE | Admitting: EMERGENCY MEDICINE
Payer: MEDICAID

## 2024-06-02 VITALS
WEIGHT: 282.85 LBS | TEMPERATURE: 98.2 F | HEIGHT: 70 IN | DIASTOLIC BLOOD PRESSURE: 84 MMHG | OXYGEN SATURATION: 97 % | BODY MASS INDEX: 40.49 KG/M2 | HEART RATE: 68 BPM | RESPIRATION RATE: 16 BRPM | SYSTOLIC BLOOD PRESSURE: 135 MMHG

## 2024-06-02 DIAGNOSIS — K62.5 RECTAL BLEEDING: ICD-10-CM

## 2024-06-02 DIAGNOSIS — K55.9 ISCHEMIC COLITIS: ICD-10-CM

## 2024-06-02 DIAGNOSIS — Z96.0 URETERAL STENT PRESENT: ICD-10-CM

## 2024-06-02 DIAGNOSIS — R10.84 ACUTE GENERALIZED ABDOMINAL PAIN: Primary | ICD-10-CM

## 2024-06-02 LAB
ABO GROUP BLD: NORMAL
ALBUMIN SERPL-MCNC: 4 G/DL (ref 3.5–5.2)
ALBUMIN/GLOB SERPL: 1.3 G/DL
ALP SERPL-CCNC: 85 U/L (ref 39–117)
ALT SERPL W P-5'-P-CCNC: 17 U/L (ref 1–41)
ANION GAP SERPL CALCULATED.3IONS-SCNC: 12.4 MMOL/L (ref 5–15)
AST SERPL-CCNC: 13 U/L (ref 1–40)
BACTERIA UR QL AUTO: ABNORMAL /HPF
BASOPHILS # BLD AUTO: 0.04 10*3/MM3 (ref 0–0.2)
BASOPHILS NFR BLD AUTO: 0.4 % (ref 0–1.5)
BILIRUB SERPL-MCNC: 0.3 MG/DL (ref 0–1.2)
BILIRUB UR QL STRIP: NEGATIVE
BLD GP AB SCN SERPL QL: NEGATIVE
BUN SERPL-MCNC: 26 MG/DL (ref 6–20)
BUN/CREAT SERPL: 17.6 (ref 7–25)
CALCIUM SPEC-SCNC: 8.9 MG/DL (ref 8.6–10.5)
CHLORIDE SERPL-SCNC: 100 MMOL/L (ref 98–107)
CLARITY UR: CLEAR
CO2 SERPL-SCNC: 26.6 MMOL/L (ref 22–29)
COLOR UR: YELLOW
CREAT SERPL-MCNC: 1.48 MG/DL (ref 0.76–1.27)
D-LACTATE SERPL-SCNC: 1.3 MMOL/L (ref 0.5–2)
DEPRECATED RDW RBC AUTO: 43.2 FL (ref 37–54)
EGFRCR SERPLBLD CKD-EPI 2021: 57.6 ML/MIN/1.73
EOSINOPHIL # BLD AUTO: 0.19 10*3/MM3 (ref 0–0.4)
EOSINOPHIL NFR BLD AUTO: 1.8 % (ref 0.3–6.2)
ERYTHROCYTE [DISTWIDTH] IN BLOOD BY AUTOMATED COUNT: 13.9 % (ref 12.3–15.4)
GLOBULIN UR ELPH-MCNC: 3 GM/DL
GLUCOSE SERPL-MCNC: 150 MG/DL (ref 65–99)
GLUCOSE UR STRIP-MCNC: NEGATIVE MG/DL
HCT VFR BLD AUTO: 38.1 % (ref 37.5–51)
HGB BLD-MCNC: 12.4 G/DL (ref 13–17.7)
HGB UR QL STRIP.AUTO: ABNORMAL
HOLD SPECIMEN: NORMAL
HOLD SPECIMEN: NORMAL
HYALINE CASTS UR QL AUTO: ABNORMAL /LPF
IMM GRANULOCYTES # BLD AUTO: 0.08 10*3/MM3 (ref 0–0.05)
IMM GRANULOCYTES NFR BLD AUTO: 0.7 % (ref 0–0.5)
INR PPP: 0.95 (ref 0.86–1.15)
KETONES UR QL STRIP: NEGATIVE
LEUKOCYTE ESTERASE UR QL STRIP.AUTO: ABNORMAL
LIPASE SERPL-CCNC: 47 U/L (ref 13–60)
LYMPHOCYTES # BLD AUTO: 0.89 10*3/MM3 (ref 0.7–3.1)
LYMPHOCYTES NFR BLD AUTO: 8.3 % (ref 19.6–45.3)
MCH RBC QN AUTO: 27.9 PG (ref 26.6–33)
MCHC RBC AUTO-ENTMCNC: 32.5 G/DL (ref 31.5–35.7)
MCV RBC AUTO: 85.6 FL (ref 79–97)
MONOCYTES # BLD AUTO: 0.58 10*3/MM3 (ref 0.1–0.9)
MONOCYTES NFR BLD AUTO: 5.4 % (ref 5–12)
NEUTROPHILS NFR BLD AUTO: 8.95 10*3/MM3 (ref 1.7–7)
NEUTROPHILS NFR BLD AUTO: 83.4 % (ref 42.7–76)
NITRITE UR QL STRIP: NEGATIVE
NRBC BLD AUTO-RTO: 0 /100 WBC (ref 0–0.2)
PH UR STRIP.AUTO: 6 [PH] (ref 5–8)
PLATELET # BLD AUTO: 271 10*3/MM3 (ref 140–450)
PMV BLD AUTO: 10.6 FL (ref 6–12)
POTASSIUM SERPL-SCNC: 3.3 MMOL/L (ref 3.5–5.2)
PROT SERPL-MCNC: 7 G/DL (ref 6–8.5)
PROT UR QL STRIP: ABNORMAL
PROTHROMBIN TIME: 12.8 SECONDS (ref 11.8–14.9)
RBC # BLD AUTO: 4.45 10*6/MM3 (ref 4.14–5.8)
RBC # UR STRIP: ABNORMAL /HPF
REF LAB TEST METHOD: ABNORMAL
RH BLD: POSITIVE
SODIUM SERPL-SCNC: 139 MMOL/L (ref 136–145)
SP GR UR STRIP: 1.02 (ref 1–1.03)
SQUAMOUS #/AREA URNS HPF: ABNORMAL /HPF
T&S EXPIRATION DATE: NORMAL
UROBILINOGEN UR QL STRIP: ABNORMAL
WBC # UR STRIP: ABNORMAL /HPF
WBC NRBC COR # BLD AUTO: 10.73 10*3/MM3 (ref 3.4–10.8)
WHOLE BLOOD HOLD COAG: NORMAL
WHOLE BLOOD HOLD SPECIMEN: NORMAL

## 2024-06-02 PROCEDURE — 96374 THER/PROPH/DIAG INJ IV PUSH: CPT

## 2024-06-02 PROCEDURE — 25010000002 ONDANSETRON PER 1 MG: Performed by: EMERGENCY MEDICINE

## 2024-06-02 PROCEDURE — 86850 RBC ANTIBODY SCREEN: CPT | Performed by: EMERGENCY MEDICINE

## 2024-06-02 PROCEDURE — 99285 EMERGENCY DEPT VISIT HI MDM: CPT

## 2024-06-02 PROCEDURE — 36415 COLL VENOUS BLD VENIPUNCTURE: CPT

## 2024-06-02 PROCEDURE — 25010000002 HYDROMORPHONE 1 MG/ML SOLUTION: Performed by: EMERGENCY MEDICINE

## 2024-06-02 PROCEDURE — 86900 BLOOD TYPING SEROLOGIC ABO: CPT | Performed by: EMERGENCY MEDICINE

## 2024-06-02 PROCEDURE — 86901 BLOOD TYPING SEROLOGIC RH(D): CPT | Performed by: EMERGENCY MEDICINE

## 2024-06-02 PROCEDURE — 74177 CT ABD & PELVIS W/CONTRAST: CPT

## 2024-06-02 PROCEDURE — 85610 PROTHROMBIN TIME: CPT | Performed by: EMERGENCY MEDICINE

## 2024-06-02 PROCEDURE — 83605 ASSAY OF LACTIC ACID: CPT | Performed by: EMERGENCY MEDICINE

## 2024-06-02 PROCEDURE — 83690 ASSAY OF LIPASE: CPT | Performed by: EMERGENCY MEDICINE

## 2024-06-02 PROCEDURE — 25810000003 SODIUM CHLORIDE 0.9 % SOLUTION: Performed by: EMERGENCY MEDICINE

## 2024-06-02 PROCEDURE — 81001 URINALYSIS AUTO W/SCOPE: CPT | Performed by: EMERGENCY MEDICINE

## 2024-06-02 PROCEDURE — 25510000001 IOPAMIDOL PER 1 ML: Performed by: EMERGENCY MEDICINE

## 2024-06-02 PROCEDURE — 85025 COMPLETE CBC W/AUTO DIFF WBC: CPT | Performed by: EMERGENCY MEDICINE

## 2024-06-02 PROCEDURE — 80053 COMPREHEN METABOLIC PANEL: CPT | Performed by: EMERGENCY MEDICINE

## 2024-06-02 PROCEDURE — 96375 TX/PRO/DX INJ NEW DRUG ADDON: CPT

## 2024-06-02 RX ORDER — SODIUM CHLORIDE 0.9 % (FLUSH) 0.9 %
10 SYRINGE (ML) INJECTION AS NEEDED
Status: DISCONTINUED | OUTPATIENT
Start: 2024-06-02 | End: 2024-06-02 | Stop reason: HOSPADM

## 2024-06-02 RX ORDER — ONDANSETRON 4 MG/1
4 TABLET, ORALLY DISINTEGRATING ORAL EVERY 6 HOURS PRN
Qty: 10 TABLET | Refills: 0 | Status: SHIPPED | OUTPATIENT
Start: 2024-06-02

## 2024-06-02 RX ORDER — OXYCODONE AND ACETAMINOPHEN 7.5; 325 MG/1; MG/1
1 TABLET ORAL EVERY 6 HOURS PRN
Qty: 12 TABLET | Refills: 0 | Status: SHIPPED | OUTPATIENT
Start: 2024-06-02

## 2024-06-02 RX ORDER — ONDANSETRON 2 MG/ML
4 INJECTION INTRAMUSCULAR; INTRAVENOUS ONCE
Status: COMPLETED | OUTPATIENT
Start: 2024-06-02 | End: 2024-06-02

## 2024-06-02 RX ADMIN — SODIUM CHLORIDE 1000 ML: 9 INJECTION, SOLUTION INTRAVENOUS at 06:50

## 2024-06-02 RX ADMIN — IOPAMIDOL 100 ML: 755 INJECTION, SOLUTION INTRAVENOUS at 07:20

## 2024-06-02 RX ADMIN — ONDANSETRON 4 MG: 2 INJECTION INTRAMUSCULAR; INTRAVENOUS at 06:52

## 2024-06-02 RX ADMIN — HYDROMORPHONE HYDROCHLORIDE 1 MG: 1 INJECTION, SOLUTION INTRAMUSCULAR; INTRAVENOUS; SUBCUTANEOUS at 06:52

## 2024-06-02 RX ADMIN — SODIUM CHLORIDE 1000 ML: 9 INJECTION, SOLUTION INTRAVENOUS at 08:29

## 2024-06-02 NOTE — ED PROVIDER NOTES
Time: 6:18 AM EDT  Date of encounter:  6/2/2024  Independent Historian/Clinical History and Information was obtained by:   Patient and Family    History is limited by: N/A    Chief Complaint: abdominal pain, bloody stool      History of Present Illness:  Patient is a 49 y.o. year old male with history of ulcerative colitis who presents to the emergency department for evaluation of recurrence of severe sharp generalized abdominal pains and bright red bleeding per rectum today.    He just was released from the hospital for an admission for the same and underwent colonoscopy with placement of clips for bleeding ulcers seen.    He is on aspirin and clopidogrel with history of cerebral aneurysm.    He is having severe abdominal pain.  No fevers.    HPI    Patient Care Team  Primary Care Provider: Ketty Toussaint APRN    Past Medical History:     No Known Allergies  Past Medical History:   Diagnosis Date    Diabetes mellitus     Hypertension      Past Surgical History:   Procedure Laterality Date    COLONOSCOPY N/A 5/25/2024    Procedure: COLONOSCOPY WITH BIOPSIES AND CLIP APPLICATION X 2;  Surgeon: Axel Zaargoza MD;  Location: [facility];  Service: Gastroenterology;  Laterality: N/A;  COLON ERROSION, COLITIS WITH ULCERATION    ENDOSCOPY N/A 5/25/2024    Procedure: ESOPHAGOGASTRODUODENOSCOPY;  Surgeon: Axel Zaragoza MD;  Location: [facility] OR;  Service: Gastroenterology;  Laterality: N/A;  MILD GASTRITIS    URETEROSCOPY LASER LITHOTRIPSY WITH STENT INSERTION Left 5/27/2024    Procedure: Cystoscopy with left ureteroscopy with laser and left ureteral left ureteral stent placement.;  Surgeon: Omid Bardales MD;  Location: [facility];  Service: Urology;  Laterality: Left;     History reviewed. No pertinent family history.    Home Medications:  Prior to Admission medications    Medication Sig Start Date End Date Taking? Authorizing Provider   amLODIPine (NORVASC) 10 MG tablet Take 1 tablet by mouth  "Daily. 3/5/24   Marques Barboza MD   aspirin 81 MG EC tablet Take 1 tablet by mouth Daily. 5/29/24   Malachi Holcomb MD   cloNIDine (CATAPRES) 0.1 MG tablet Take 1 tablet by mouth 2 (Two) Times a Day. 3/5/24   Marques Barboza MD   clopidogrel (PLAVIX) 75 MG tablet Take 1 tablet by mouth Daily. 1/24/24   Marques Barboza MD   hydrALAZINE (APRESOLINE) 25 MG tablet Take 1 tablet by mouth 2 (Two) Times a Day.    Marques Barboza MD   losartan-hydrochlorothiazide (HYZAAR) 100-25 MG per tablet Take 1 tablet by mouth Daily. 1/29/24   Marques Barboza MD   metoprolol succinate XL (TOPROL-XL) 50 MG 24 hr tablet Take 1 tablet by mouth Every Evening.    Marques Barboza MD   Ozempic, 0.25 or 0.5 MG/DOSE, 2 MG/3ML solution pen-injector Inject 0.5 mg under the skin into the appropriate area as directed 1 (One) Time Per Week. 2/19/24   Marques Barboza MD   pantoprazole (PROTONIX) 40 MG EC tablet Take 1 tablet by mouth Daily. 2/13/24   Marques Barboza MD   PEG 3350 17 GM/SCOOP powder Take 17 g by mouth Daily. 5/4/24   Marques Barboza MD   tamsulosin (FLOMAX) 0.4 MG capsule 24 hr capsule Take 1 capsule by mouth Daily. 5/30/24   Malachi Holcomb MD   vitamin D (ERGOCALCIFEROL) 1.25 MG (26515 UT) capsule capsule Take 1 tablet by mouth 1 (One) Time Per Week. 2/12/24   Marques Barboza MD        Social History:   Social History     Tobacco Use    Smoking status: Never    Smokeless tobacco: Never   Vaping Use    Vaping status: Never Used   Substance Use Topics    Alcohol use: Not Currently    Drug use: Never         Review of Systems:  Review of Systems   I performed a 10 point review of systems which was all negative, except for the positives found in the HPI above.  Physical Exam:  /84   Pulse 68   Temp 98.2 °F (36.8 °C) (Oral)   Resp 16   Ht 177.8 cm (70\")   Wt 128 kg (282 lb 13.6 oz)   SpO2 97%   BMI 40.58 kg/m²     Physical Exam   General: Awake " alert and in no obvious distress    HEENT: Head normocephalic atraumatic, eyes PERRLA EOMI, nose normal, oropharynx normal.    Neck: Supple full range of motion, no meningismus, no lymphadenopathy    Heart: Regular rate and rhythm, no murmurs or rubs, 2+ radial pulses bilaterally    Lungs: Clear to auscultation bilaterally without wheezes or crackles, no respiratory distress    Abdomen: Soft, moderately tender diffusely, nondistended, no rebound or guarding    Skin: Warm, dry, no rash    Musculoskeletal: Normal range of motion, no lower extremity edema    Neurologic: Oriented x3, no motor deficits no sensory deficits    Psychiatric: Mood appears stable, no psychosis          Procedures:  Procedures      Medical Decision Making:      Comorbidities that affect care:    On anticoagulation given history of previous cerebral aneurysm, history of ulcerative colitis    External Notes reviewed:    Hospital Discharge Summary: I reviewed his recent hospital discharge summary where he was just admitted in the past week for colitis and found to have bleeding ulcers and erosions of the colon requiring clips placed last week      The following orders were placed and all results were independently analyzed by me:  Orders Placed This Encounter   Procedures    CT Abdomen Pelvis With Contrast    [place] Draw    Comprehensive Metabolic Panel    Lipase    Urinalysis With Microscopic If Indicated (No Culture) - Urine, Clean Catch    CBC Auto Differential    Protime-INR    Urinalysis, Microscopic Only - Urine, Clean Catch    Lactic Acid, Plasma    Undress & Gown    Type & Screen    CBC & Differential    Green Top (Gel)    Lavender Top    Gold Top - SST    Light Blue Top       Medications Given in the Emergency Department:  Medications   sodium chloride 0.9 % bolus 1,000 mL (0 mL Intravenous Stopped 6/2/24 0814)   HYDROmorphone (DILAUDID) injection 1 mg (1 mg Intravenous Given 6/2/24 0647)   ondansetron (ZOFRAN) injection 4 mg (4 mg  Intravenous Given 6/2/24 0652)   iopamidol (ISOVUE-370) 76 % injection 100 mL (100 mL Intravenous Given 6/2/24 0720)   sodium chloride 0.9 % bolus 1,000 mL (0 mL Intravenous Stopped 6/2/24 0908)        ED Course:         Labs:    Lab Results (last 24 hours)       Procedure Component Value Units Date/Time    CBC & Differential [[id number]]  (Abnormal) Collected: 06/02/24 0610    Specimen: Blood Updated: 06/02/24 0627    Narrative:      The following orders were created for panel order CBC & Differential.  Procedure                               Abnormality         Status                     ---------                               -----------         ------                     CBC Auto Differential[[id number]]        Abnormal            Final result                 Please view results for these tests on the individual orders.    Comprehensive Metabolic Panel [[id number]]  (Abnormal) Collected: 06/02/24 0610    Specimen: Blood Updated: 06/02/24 0644     Glucose 150 mg/dL      BUN 26 mg/dL      Creatinine 1.48 mg/dL      Sodium 139 mmol/L      Potassium 3.3 mmol/L      Chloride 100 mmol/L      CO2 26.6 mmol/L      Calcium 8.9 mg/dL      Total Protein 7.0 g/dL      Albumin 4.0 g/dL      ALT (SGPT) 17 U/L      AST (SGOT) 13 U/L      Alkaline Phosphatase 85 U/L      Total Bilirubin 0.3 mg/dL      Globulin 3.0 gm/dL      A/G Ratio 1.3 g/dL      BUN/Creatinine Ratio 17.6     Anion Gap 12.4 mmol/L      eGFR 57.6 mL/min/1.73     Narrative:      GFR Normal >60  Chronic Kidney Disease <60  Kidney Failure <15      Lipase [[id number]]  (Normal) Collected: 06/02/24 0610    Specimen: Blood Updated: 06/02/24 0644     Lipase 47 U/L     CBC Auto Differential [[id number]]  (Abnormal) Collected: 06/02/24 0610    Specimen: Blood Updated: 06/02/24 0627     WBC 10.73 10*3/mm3      RBC 4.45 10*6/mm3      Hemoglobin 12.4 g/dL      Hematocrit 38.1 %      MCV 85.6 fL      MCH 27.9 pg      MCHC 32.5 g/dL      RDW 13.9 %      RDW-SD 43.2 fl      MPV  10.6 fL      Platelets 271 10*3/mm3      Neutrophil % 83.4 %      Lymphocyte % 8.3 %      Monocyte % 5.4 %      Eosinophil % 1.8 %      Basophil % 0.4 %      Immature Grans % 0.7 %      Neutrophils, Absolute 8.95 10*3/mm3      Lymphocytes, Absolute 0.89 10*3/mm3      Monocytes, Absolute 0.58 10*3/mm3      Eosinophils, Absolute 0.19 10*3/mm3      Basophils, Absolute 0.04 10*3/mm3      Immature Grans, Absolute 0.08 10*3/mm3      nRBC 0.0 /100 WBC     Protime-INR [[id number]]  (Normal) Collected: 06/02/24 0610    Specimen: Blood Updated: 06/02/24 0642     Protime 12.8 Seconds      INR 0.95    Narrative:      Suggested Therapeutic Ranges For Oral Anticoagulant Therapy:  Level of Therapy                      INR Target Range  Standard Dose                            2.0-3.0  High Dose                                2.5-3.5  Patients not receiving anticoagulant  Therapy Normal Range                     0.86-1.15    Urinalysis With Microscopic If Indicated (No Culture) - Urine, Clean Catch [[id number]]  (Abnormal) Collected: 06/02/24 0721    Specimen: Urine, Clean Catch Updated: 06/02/24 0740     Color, UA Yellow     Appearance, UA Clear     pH, UA 6.0     Specific Gravity, UA 1.021     Glucose, UA Negative     Ketones, UA Negative     Bilirubin, UA Negative     Blood, UA Large (3+)     Protein, UA 30 mg/dL (1+)     Leuk Esterase, UA Moderate (2+)     Nitrite, UA Negative     Urobilinogen, UA 0.2 E.U./dL    Urinalysis, Microscopic Only - Urine, Clean Catch [[id number]]  (Abnormal) Collected: 06/02/24 0721    Specimen: Urine, Clean Catch Updated: 06/02/24 0740     RBC, UA Too Numerous to Count /HPF      WBC, UA 21-50 /HPF      Bacteria, UA 1+ /HPF      Squamous Epithelial Cells, UA 0-2 /HPF      Hyaline Casts, UA 0-2 /LPF      Methodology Automated Microscopy    Lactic Acid, Plasma [[id number]]  (Normal) Collected: 06/02/24 0829    Specimen: Blood Updated: 06/02/24 0855     Lactate 1.3 mmol/L              Imaging:    CT Abdomen  Pelvis With Contrast    Result Date: 6/2/2024  CT ABDOMEN PELVIS W CONTRAST-  Date of Exam: 6/2/2024 6:57 AM  Indication: severe abd pain, rectal bleeding; recent colitis; R10.84-Generalized abdominal pain; K62.5-Hemorrhage of anus and rectum.  Comparison: CT abdomen pelvis dated 5/24/2024  Technique: Axial CT images were obtained of the abdomen and pelvis following the uneventful intravenous administration of contrast. Reconstructed coronal and sagittal images were also obtained. Automated exposure control and iterative construction methods were used.   Findings: Lung bases are clear bilaterally.  Diffuse hepatic steatosis is noted. No focal hepatic lesion is seen. The gallbladder, spleen, pancreas appears unremarkable. Both adrenal glands demonstrate a diffusely enlarged morphology which has been described on a previous chest CT dated 6/21/2018. In the mid right kidney is a 1.1 cm low-density focus favored to represent a small cyst. There is a 0.3 cm nonobstructing left renal stone. A left nephroureteral stent has been placed in good position.  No adenopathy or free fluid is seen in the abdomen or pelvis. Prostate and seminal vesicles appear unremarkable. The urinary bladder appears normal.  There are metallic clips noted in the proximal sigmoid colon. The lack of oral contrast limits evaluation of the mucosa. No overt abnormality of the colon is identified. No free air is seen. The appendix appears normal. No small bowel abnormality is identified.  No focal osseous lesion is seen.      Impression: 1.  Recent colonoscopy. Surgical clips in the proximal sigmoid colon presumably representing the site of previous biopsy. No acute bowel abnormality demonstrated. 2.  Diffuse hepatic steatosis. 3.  Left nephroureteral stent in good position. 4.  Nonobstructing 0.3 cm left renal stone 5.  Diffuse nodularity of both adrenal glands, suspected to represent adrenal hyperplasia and/or adenomas.   Electronically Signed  By-Anupam Frazier MD On:6/2/2024 7:45 AM         Differential Diagnosis and Discussion:    Abdominal Pain: Based on the patient's signs and symptoms, I considered abdominal aortic aneurysm, small bowel obstruction, pancreatitis, acute cholecystitis, acute appendecitis, peptic ulcer disease, gastritis, colitis, endocrine disorders, irritable bowel syndrome and other differential diagnosis an etiology of the patient's abdominal pain.  GI Bleeding: Differential diagnosis includes but is not limited to gastritis, gastric ulcer, stress ulcer, duodenitis, Susannah-Morejon tears, esophageal varices, angiodysplasia, aortic enteric fistula, hematologic issues including thrombocytopenia, GI neoplasm, ulcerative colitis, Crohn's disease, diverticulosis, diverticulitis, hemorrhoids, aortic aneurysm, and polyps    All labs were reviewed and interpreted by me.  CT scan radiology impression was interpreted by me.    MDM     Amount and/or Complexity of Data Reviewed  Clinical lab tests: reviewed  Tests in the radiology section of CPT®: reviewed  Decide to obtain previous medical records or to obtain history from someone other than the patient: yes               This patient is a pleasant 49-year-old male with recent hospitalization this past week for ulcerative colitis and required clips placed for bleeding colonic ulcers seen on colonoscopy this week now presents with recurrent severe sharp abdominal pain and bright red rectal bleeding today.    Of note he also recently had left ureteral stone removal and placement of ureteral stent but not having flank pain at this time or urinary symptoms.    I am giving him IV pain and nausea meds for symptom relief and hydrating him and checking lab work to assess for blood loss anemia.    We will get a repeat CT of the abdomen pelvis to rule out continued acute colitis or any perforation or free air following recent procedure.    CT showed no acute findings.    Lactic acid was normal and I do not  have any suspicion for ischemic colitis right now.    After some doses of pain and nausea meds and fluids he looked improved and stable to be discharged home and he will continue PPI and clear liquid diet for now and follow-up with his doctor.              Patient Care Considerations:          Consultants/Shared Management Plan:        Social Determinants of Health:    Patient is independent, reliable, and has access to care.       Disposition and Care Coordination:    Discharged: I considered escalation of care by admitting this patient to the hospital, however he looked improved on reassessment and no acute emergency found.    I have explained the patient´s condition, diagnoses and treatment plan based on the information available to me at this time. I have answered questions and addressed any concerns. The patient has a good  understanding of the patient´s diagnosis, condition, and treatment plan as can be expected at this point. The vital signs have been stable. The patient´s condition is stable and appropriate for discharge from the emergency department.      The patient will pursue further outpatient evaluation with the primary care physician or other designated or consulting physician as outlined in the discharge instructions. They are agreeable to this plan of care and follow-up instructions have been explained in detail. The patient has received these instructions in written format and has expressed an understanding of the discharge instructions. The patient is aware that any significant change in condition or worsening of symptoms should prompt an immediate return to this or the closest emergency department or call to 911.  I have explained discharge medications and the need for follow up with the patient/caretakers. This was also printed in the discharge instructions. Patient was discharged with the following medications and follow up:      Medication List        New Prescriptions      ondansetron ODT 4  MG disintegrating tablet  Commonly known as: ZOFRAN-ODT  Place 1 tablet on the tongue Every 6 (Six) Hours As Needed for Nausea or Vomiting.     oxyCODONE-acetaminophen 7.5-325 MG per tablet  Commonly known as: PERCOCET  Take 1 tablet by mouth Every 6 (Six) Hours As Needed for Severe Pain.               Where to Get Your Medications        These medications were sent to [facility] [address] [place], [place] [address] - [phone] [facility] [phone] [address] [facility] [place] [address]      Phone: [phone]   ondansetron ODT 4 MG disintegrating tablet  oxyCODONE-acetaminophen 7.5-325 MG per tablet      No follow-up provider specified.     Final diagnoses:   Acute generalized abdominal pain   Rectal bleeding   Ischemic colitis   Ureteral stent present        ED Disposition       ED Disposition   Discharge    Condition   Stable    Comment   --               This medical record created using voice recognition software.             Eliecer Gandara MD  06/02/24 2139

## 2024-06-03 ENCOUNTER — TELEPHONE (OUTPATIENT)
Dept: GASTROENTEROLOGY | Facility: CLINIC | Age: 49
End: 2024-06-03
Payer: MEDICAID

## 2024-06-03 ENCOUNTER — PREP FOR SURGERY (OUTPATIENT)
Dept: OTHER | Facility: HOSPITAL | Age: 49
End: 2024-06-03
Payer: MEDICAID

## 2024-06-03 DIAGNOSIS — R31.9 HEMATURIA, UNSPECIFIED TYPE: Primary | ICD-10-CM

## 2024-06-03 DIAGNOSIS — K55.9 ISCHEMIC COLITIS: Primary | ICD-10-CM

## 2024-06-03 NOTE — TELEPHONE ENCOUNTER
Spoke to patient and informed of Dr. Zaragoza's result note and recommendations. Verified patient understanding.    Advised to avoid NSAIDs.  Patient states he stopped taking ibuprofen recently.  Encouraged patient to speak with PCP for alternative therapies for chronic knee pain.  Patient voiced understanding.    Patient agreeable to repeat colonoscopy.  Verified clearances:  Patient states he no longer takes Ozempic.    CR entered for second sign.  Plavix clearance needed.

## 2024-06-03 NOTE — TELEPHONE ENCOUNTER
Axel Zaragoza MD Irwin, Tiffany, MA  Cc: Sweta Conway RN  Patient was inpatient over [facility].  He underwent EGD and colonoscopy.  Colonoscopy biopsy findings are consistent with ischemic colitis.    I tried to contact the patient but it seems his number got disconnected.  Please let him know that he needs repeat colonoscopy in 6 months.  He should avoid hypotension and let his neurologist know about the diagnosis of ischemic colitis.  This will improve over next few months by itself.  No treatment is indicated

## 2024-06-04 PROBLEM — K55.9 ISCHEMIC COLITIS: Status: ACTIVE | Noted: 2024-06-03

## 2024-06-04 LAB — CALPROTECTIN STL-MCNT: 193 UG/G (ref 0–120)

## 2024-06-04 NOTE — TELEPHONE ENCOUNTER
Colonoscopy with Dr. Zaragoza scheduled for Monday, 11.25.24 with arrival time of 0600.    Attempted to contact patient, left voicemail message to return call. Provided direct contact information.    [employer] message sent.  Brochure reminder mailed.  Noted clearance book.

## 2024-06-04 NOTE — TELEPHONE ENCOUNTER
Patient returned call.  Reviewed scheduling and prep instructions, patient aware that [employer] message and brochure have been sent.  Reminded of need for a  post-procedure and to reach out to the office as soon as possible if procedure needs to be rescheduled.  Patient verbalized understanding.

## 2024-06-05 PROBLEM — N20.0 NEPHROLITHIASIS: Status: ACTIVE | Noted: 2024-06-05

## 2024-06-05 NOTE — PROGRESS NOTES
Cytoscopy with Stent Removal     Pre-Procedure Diagnosis:   Nephrolithiasis    Post-Procedural Diagnosis: Same    Procedure Performed: Cystoscopy with Ureteral Stent Removal     Description of the Procedure:      was appropriately identified and brought to the cytoscopy suite. A timeout was undertaken documenting the correct patient, site, and procedure. The patient was prepped in a normal sterile fashion. A flexible cytoscope was then introduced into the bladder. The stent was identified and grasped with endoscopic graspers. The entire stent was removed and passed off the field. Patient tolerated the procedure well. There were no intraprocedural complications.        Assessment and Plan   Diagnoses and all orders for this visit:    1. Nephrolithiasis (Primary)          Current Outpatient Medications:     amLODIPine (NORVASC) 10 MG tablet, Take 1 tablet by mouth Daily., Disp: , Rfl:     aspirin 81 MG EC tablet, Take 1 tablet by mouth Daily., Disp: 30 tablet, Rfl: 0    cloNIDine (CATAPRES) 0.1 MG tablet, Take 1 tablet by mouth 2 (Two) Times a Day., Disp: , Rfl:     clopidogrel (PLAVIX) 75 MG tablet, Take 1 tablet by mouth Daily., Disp: , Rfl:     hydrALAZINE (APRESOLINE) 25 MG tablet, Take 1 tablet by mouth 2 (Two) Times a Day., Disp: , Rfl:     losartan-hydrochlorothiazide (HYZAAR) 100-25 MG per tablet, Take 1 tablet by mouth Daily., Disp: , Rfl:     metoprolol succinate XL (TOPROL-XL) 50 MG 24 hr tablet, Take 1 tablet by mouth Every Evening., Disp: , Rfl:     ondansetron ODT (ZOFRAN-ODT) 4 MG disintegrating tablet, Place 1 tablet on the tongue Every 6 (Six) Hours As Needed for Nausea or Vomiting., Disp: 10 tablet, Rfl: 0    oxyCODONE-acetaminophen (PERCOCET) 7.5-325 MG per tablet, Take 1 tablet by mouth Every 6 (Six) Hours As Needed for Severe Pain., Disp: 12 tablet, Rfl: 0    pantoprazole (PROTONIX) 40 MG EC tablet, Take 1 tablet by mouth Daily., Disp: , Rfl:     PEG 3350 17 GM/SCOOP powder, Take 17 g by mouth  Daily., Disp: , Rfl:     polyethylene glycol (GoLYTELY) 236 g solution, Mix solution.  Drink 2/3 of solution at 6:00 PM on the evening prior to procedure.  Drink remaining 1/3 of solution four hours prior to the scheduled arrival-time on the morning of the procedure., Disp: 4000 mL, Rfl: 0    tamsulosin (FLOMAX) 0.4 MG capsule 24 hr capsule, Take 1 capsule by mouth Daily., Disp: 30 capsule, Rfl: 0    vitamin D (ERGOCALCIFEROL) 1.25 MG (32728 UT) capsule capsule, Take 1 tablet by mouth 1 (One) Time Per Week., Disp: , Rfl:       Electronically Signed by: Omid Bardales MD on 06/07/2024

## 2024-06-06 LAB
CALCIUM OXALATE DIHYDRATE MFR STONE IR: 20 %
COLOR STONE: NORMAL
COM MFR STONE: 80 %
COMPN STONE: NORMAL
LABORATORY COMMENT REPORT: NORMAL
Lab: NORMAL
Lab: NORMAL
PHOTO: NORMAL
SIZE STONE: NORMAL MM
SPEC SOURCE SUBJ: NORMAL
WT STONE: 80 MG

## 2024-06-07 ENCOUNTER — PROCEDURE VISIT (OUTPATIENT)
Dept: UROLOGY | Facility: CLINIC | Age: 49
End: 2024-06-07
Payer: MEDICAID

## 2024-06-07 VITALS — HEIGHT: 70 IN | BODY MASS INDEX: 40.4 KG/M2 | WEIGHT: 282.19 LBS

## 2024-06-07 DIAGNOSIS — N20.0 NEPHROLITHIASIS: Primary | ICD-10-CM

## 2024-06-11 ENCOUNTER — TELEPHONE (OUTPATIENT)
Dept: UROLOGY | Facility: CLINIC | Age: 49
End: 2024-06-11
Payer: MEDICAID

## 2024-06-11 NOTE — TELEPHONE ENCOUNTER
Called pt to discuss his concerns. He is not having any other symptoms, only saw blood and clots a couple times. I advised him that this is completely normal and he may continue to see blood/clots for a few weeks. I advised him that as long as he can urinate the clots out and is not pouring blood or having other abnormal symptoms, there is nothing for him to worry about. I advised him to call with any further questions or concerns. Pt verbalized understanding.

## 2024-06-11 NOTE — TELEPHONE ENCOUNTER
PATIENT STATES HE HAD A STENT PULLED ON FRIDAY AND HE IS HAVING BLOOD AND CLOTS IN HIS URINE. HE WANT'S TO KNOW IF THIS IS NORMAL? HE STATES HE HAS NOT PASSED HIS STONE YET. # [phone].

## 2024-06-18 ENCOUNTER — READMISSION MANAGEMENT (OUTPATIENT)
Dept: CALL CENTER | Facility: HOSPITAL | Age: 49
End: 2024-06-18
Payer: MEDICAID

## 2024-06-18 NOTE — OUTREACH NOTE
Medical Week 3 Survey      Flowsheet Row Responses   [facility] facility patient discharged from? [facility]   Does the patient have one of the following disease processes/diagnoses(primary or secondary)? Other   Week 3 attempt successful? No   Unsuccessful attempts Attempt 1            OLLIE Li Registered Nurse

## 2024-07-03 NOTE — PROGRESS NOTES
Chief Complaint: Urologic complaint    Subjective         History of Present Illness  Robert Valdez is a 49 y.o. male       Nephrolithiasis      past medical history of hypertension, prediabetes, morbid obesity, and GERD     Renal Ultrasound pending.    Has stopped taking ibuprofen.    No gross hematuria/dysuria    Minimal to no left flank pain.    Patient is having quite a bit of right paraspinal lumbar pain.  Some intermittent right flank pain.    Stent removed in office  5/27/2024 left ureteroscopy, stent no string - all stones removed from the left.    6/24   calcium oxalate monohydrate 80, calcium oxalate dihydrate 20    On Plavix     Previous    5/25/2024 admit to hospital with GI bleed incidentally found to have a kidney stone     per neurology secondary to history of cerebral aneurysm with stent        5/24/2024 CT abdomen/pelvis with - mild left hydro 8 mm stone mid left ureter.  Punctate stone in the left kidney 1 mm no other stones.  Images reviewed  5/25/2024 1.7, GFR 48, H/H11/34 stable     On semaglutide     Has not seen a urologist because no previous kidney stones        Objective     Past Medical History:   Diagnosis Date    Diabetes mellitus     Hypertension        Past Surgical History:   Procedure Laterality Date    COLONOSCOPY N/A 5/25/2024    Procedure: COLONOSCOPY WITH BIOPSIES AND CLIP APPLICATION X 2;  Surgeon: Axel Zaragoza MD;  Location: [facility];  Service: Gastroenterology;  Laterality: N/A;  COLON ERROSION, COLITIS WITH ULCERATION    ENDOSCOPY N/A 5/25/2024    Procedure: ESOPHAGOGASTRODUODENOSCOPY;  Surgeon: Axel Zaragoza MD;  Location: [facility] OR;  Service: Gastroenterology;  Laterality: N/A;  MILD GASTRITIS    URETEROSCOPY LASER LITHOTRIPSY WITH STENT INSERTION Left 5/27/2024    Procedure: Cystoscopy with left ureteroscopy with laser and left ureteral left ureteral stent placement.;  Surgeon: Omid Bardales MD;  Location: [facility] OR;  Service:  Urology;  Laterality: Left;                       Assessment and Plan    Diagnoses and all orders for this visit:    1. Nephrolithiasis (Primary)         CT reviewed, no stones on the right.  Punctate stone on the left.  Discussed with patient at this time I do think he is having some lumbar radiculopathy.    The patient was counseled on the preventative measures of kidney stones today.  This included increasing fluid intake to make at least 1.5 ml daily, decreasing meat intake, decreasing salt intake and taking in a normal amount of calcium (1000 mg daily).  Information handout given on this today.    We also discussed the DASH diet today for stone prevention and handout was given    F/u as needed.

## 2024-07-08 ENCOUNTER — HOSPITAL ENCOUNTER (OUTPATIENT)
Dept: ULTRASOUND IMAGING | Facility: HOSPITAL | Age: 49
Discharge: HOME | End: 2024-07-08
Admitting: UROLOGY
Payer: MEDICAID

## 2024-07-08 DIAGNOSIS — N20.0 NEPHROLITHIASIS: ICD-10-CM

## 2024-07-08 PROCEDURE — 76775 US EXAM ABDO BACK WALL LIM: CPT

## 2024-07-09 ENCOUNTER — OFFICE VISIT (OUTPATIENT)
Dept: UROLOGY | Facility: CLINIC | Age: 49
End: 2024-07-09
Payer: MEDICAID

## 2024-07-09 VITALS — HEIGHT: 70 IN | WEIGHT: 282 LBS | BODY MASS INDEX: 40.37 KG/M2

## 2024-07-09 DIAGNOSIS — N20.0 NEPHROLITHIASIS: Primary | ICD-10-CM

## 2024-07-09 PROCEDURE — 1159F MED LIST DOCD IN RCRD: CPT | Performed by: UROLOGY

## 2024-07-09 PROCEDURE — 99213 OFFICE O/P EST LOW 20 MIN: CPT | Performed by: UROLOGY

## 2024-07-09 PROCEDURE — 1160F RVW MEDS BY RX/DR IN RCRD: CPT | Performed by: UROLOGY

## 2024-07-12 ENCOUNTER — TELEPHONE (OUTPATIENT)
Dept: GASTROENTEROLOGY | Facility: CLINIC | Age: 49
End: 2024-07-12
Payer: MEDICAID

## 2024-07-12 NOTE — TELEPHONE ENCOUNTER
Attempted to contact patient to see if they wanted to reschedule at this time. [facility]

## 2024-07-12 NOTE — TELEPHONE ENCOUNTER
----- Message from [facility] sent at 7/10/2024  5:51 PM EDT -----  Regarding: Appointment Request  Contact: [phone]  Appointment Request From: Robert Valdez    With Provider: Vandana Galo [[facility] GASTROENTEROLOGY]    Preferred Date Range: Any    Preferred Times: Any Time    Reason for visit: Follow-up    Comments:  July 22nd appointment, thought it was cancelled? Was told every thing over the phone. No sense in missing work if not needed.

## 2024-07-17 ENCOUNTER — TELEPHONE (OUTPATIENT)
Dept: UROLOGY | Facility: CLINIC | Age: 49
End: 2024-07-17
Payer: MEDICAID

## 2024-07-17 NOTE — TELEPHONE ENCOUNTER
PATIENT CALLED TO GET ULTRASOUND RESULTS. PER JR I COULD TELL PATIENT IT WAS NEGATIVE FOR STONES. PATIENT STATED HE IS HAVING BAD LOWER ABDOMINAL PAIN AND THAT THE TEST SAID HE HAD A FATTY LIVER AND ONE KIDNEY WAS ENLARGED. HE WOULD LIKE TO SPEAK WITH SOMEONE ABOUT THIS.

## 2024-07-18 NOTE — TELEPHONE ENCOUNTER
Notified pt that per Dr Bardales    Patient should discuss fatty liver with PCP.  A lot of Americans have fatty liver because of our diet but they would be the ones to address this.    Ultrasound was stone cold normal, 1 kidney does measure larger than the other but that is okay.  Everything was normal      Pt verbalized understanding.

## 2024-07-22 ENCOUNTER — OFFICE VISIT (OUTPATIENT)
Dept: GASTROENTEROLOGY | Facility: CLINIC | Age: 49
End: 2024-07-22
Payer: MEDICAID

## 2024-07-22 VITALS
SYSTOLIC BLOOD PRESSURE: 154 MMHG | BODY MASS INDEX: 40.97 KG/M2 | DIASTOLIC BLOOD PRESSURE: 86 MMHG | OXYGEN SATURATION: 96 % | WEIGHT: 286.2 LBS | HEIGHT: 70 IN | HEART RATE: 85 BPM

## 2024-07-22 DIAGNOSIS — K76.0 FATTY LIVER: ICD-10-CM

## 2024-07-22 DIAGNOSIS — Z87.19 HISTORY OF ISCHEMIC COLITIS: Primary | ICD-10-CM

## 2024-07-22 DIAGNOSIS — D50.9 IRON DEFICIENCY ANEMIA, UNSPECIFIED IRON DEFICIENCY ANEMIA TYPE: ICD-10-CM

## 2024-07-22 PROCEDURE — 3079F DIAST BP 80-89 MM HG: CPT

## 2024-07-22 PROCEDURE — 3077F SYST BP >= 140 MM HG: CPT

## 2024-07-22 PROCEDURE — 1159F MED LIST DOCD IN RCRD: CPT

## 2024-07-22 PROCEDURE — 1160F RVW MEDS BY RX/DR IN RCRD: CPT

## 2024-07-22 PROCEDURE — 99214 OFFICE O/P EST MOD 30 MIN: CPT

## 2024-07-22 RX ORDER — FERROUS SULFATE 325(65) MG
325 TABLET ORAL
Qty: 90 TABLET | Refills: 1 | Status: SHIPPED | OUTPATIENT
Start: 2024-07-22

## 2024-07-22 NOTE — PROGRESS NOTES
Chief Complaint     Rectal Bleeding, Black or Bloody Stool, and Follow-up    History of Present Illness     Robert Valdez is a 49 y.o. male who presents to [facility] for follow-up of ischemic colitis. He was evaluated in the ED on 5/24/2/2024 for abdominal pain and bloody stool. Underwent colonoscopy with placement of clips for bleeding ulcer seen. Of noted patient stated he was on ozempic during this time and has since discontinued the medication.     Today the patient is asymptomatic.  Denies any nausea vomiting, or heartburn. Denies abdominal pain, tarry stools or bright red blood in stool.  Patient reports having 1-2 a BM/day, no straining, formed soft brown.     Negative family history of colon cancer or colon polyps.  Last colonoscopy 5/25/2024 Linear mucosal ulcerations from this rectum up to the descending colon and the sigmoid colon deeply ulcerated mucosa with sloughing and deep laceration 2 hemostatic clips placed.  Random colon biopsy showed ischemic colitis.  Repeat colonoscopy in 6 months (colonoscopy scheduled 11/25/2024).  EGD 5/25/2024 erythematous mucosa in the gastric body noted.       History      Past Medical History:   Diagnosis Date    Diabetes mellitus     Hypertension      Past Surgical History:   Procedure Laterality Date    COLONOSCOPY N/A 5/25/2024    Procedure: COLONOSCOPY WITH BIOPSIES AND CLIP APPLICATION X 2;  Surgeon: Axel Zaragoza MD;  Location: [facility];  Service: Gastroenterology;  Laterality: N/A;  COLON ERROSION, COLITIS WITH ULCERATION    ENDOSCOPY N/A 5/25/2024    Procedure: ESOPHAGOGASTRODUODENOSCOPY;  Surgeon: Axel Zaragoza MD;  Location: [facility] OR;  Service: Gastroenterology;  Laterality: N/A;  MILD GASTRITIS    URETEROSCOPY LASER LITHOTRIPSY WITH STENT INSERTION Left 5/27/2024    Procedure: Cystoscopy with left ureteroscopy with laser and left ureteral left ureteral stent placement.;  Surgeon: Omid Bardales  MD GABI;  Location: [facility] OR;  Service: Urology;  Laterality: Left;     Family History   Problem Relation Age of Onset    Colon cancer Neg Hx         Current Medications       Current Outpatient Medications:     amLODIPine (NORVASC) 10 MG tablet, Take 1 tablet by mouth Daily., Disp: , Rfl:     aspirin 81 MG EC tablet, Take 1 tablet by mouth Daily., Disp: 30 tablet, Rfl: 0    cloNIDine (CATAPRES) 0.1 MG tablet, Take 1 tablet by mouth 2 (Two) Times a Day., Disp: , Rfl:     metoprolol succinate XL (TOPROL-XL) 50 MG 24 hr tablet, Take 1 tablet by mouth Every Evening., Disp: , Rfl:     ondansetron ODT (ZOFRAN-ODT) 4 MG disintegrating tablet, Place 1 tablet on the tongue Every 6 (Six) Hours As Needed for Nausea or Vomiting., Disp: 10 tablet, Rfl: 0    pantoprazole (PROTONIX) 40 MG EC tablet, Take 1 tablet by mouth Daily., Disp: , Rfl:     tamsulosin (FLOMAX) 0.4 MG capsule 24 hr capsule, Take 1 capsule by mouth Daily., Disp: 30 capsule, Rfl: 0    vitamin D (ERGOCALCIFEROL) 1.25 MG (83167 UT) capsule capsule, Take 1 tablet by mouth 1 (One) Time Per Week., Disp: , Rfl:     clopidogrel (PLAVIX) 75 MG tablet, Take 1 tablet by mouth Daily. (Patient not taking: Reported on 7/22/2024), Disp: , Rfl:     ferrous sulfate 325 (65 FE) MG tablet, Take 1 tablet by mouth Daily With Breakfast., Disp: 90 tablet, Rfl: 1    hydrALAZINE (APRESOLINE) 25 MG tablet, Take 1 tablet by mouth 2 (Two) Times a Day. (Patient not taking: Reported on 6/7/2024), Disp: , Rfl:     losartan-hydrochlorothiazide (HYZAAR) 100-25 MG per tablet, Take 1 tablet by mouth Daily. (Patient not taking: Reported on 7/9/2024), Disp: , Rfl:     oxyCODONE-acetaminophen (PERCOCET) 7.5-325 MG per tablet, Take 1 tablet by mouth Every 6 (Six) Hours As Needed for Severe Pain. (Patient not taking: Reported on 7/22/2024), Disp: 12 tablet, Rfl: 0    PEG 3350 17 GM/SCOOP powder, Take 17 g by mouth Daily. (Patient not taking: Reported on 7/22/2024), Disp: , Rfl:     polyethylene  "glycol (GoLYTELY) 236 g solution, Mix solution.  Drink 2/3 of solution at 6:00 PM on the evening prior to procedure.  Drink remaining 1/3 of solution four hours prior to the scheduled arrival-time on the morning of the procedure. (Patient not taking: Reported on 7/22/2024), Disp: 4000 mL, Rfl: 0     Allergies     No Known Allergies    Social History       Social History     Social History Narrative    Not on file         Objective       /86 (BP Location: Left arm, Patient Position: Sitting, Cuff Size: Adult)   Pulse 85   Ht 177.8 cm (70\")   Wt 130 kg (286 lb 3.2 oz)   SpO2 96%   BMI 41.07 kg/m²       Physical Exam  HENT:      Head: Normocephalic.   Pulmonary:      Effort: Pulmonary effort is normal.   Abdominal:      Palpations: Abdomen is soft.      Tenderness: There is no abdominal tenderness.   Skin:     General: Skin is warm and dry.   Neurological:      General: No focal deficit present.      Mental Status: He is alert and oriented to person, place, and time.   Psychiatric:         Mood and Affect: Mood normal.         Behavior: Behavior normal.         Results       Result Review :    The following data was reviewed by: ADRIAN Maurer on 07/22/2024:    Lab Results - Last 18 Months   Lab Units 06/20/24  0840 06/02/24  0610 05/29/24  0620   WBC 10*3/uL 7.46 10.73 6.93   HEMOGLOBIN g/dL 11.6* 12.4* 12.5*   MCV fL 85.2 85.6 83.4   PLATELETS 10*3/uL 291 271 256         Lab Results - Last 18 Months   Lab Units 06/02/24  0610 05/29/24  0620 05/28/24  0525   BUN mg/dL 26* 23* 21*   CREATININE mg/dL 1.48* 1.65* 1.74*   SODIUM mmol/L 139 136 136   POTASSIUM mmol/L 3.3* 3.3* 3.4*   CHLORIDE mmol/L 100 97* 97*   CO2 mmol/L 26.6 29.8* 29.1*   GLUCOSE mg/dL 150* 112* 122*      Lab Results - Last 18 Months   Lab Units 06/20/24  0840 06/02/24  0610 05/24/24  1415   PROTIME s 11.9 12.8 12.9   INR INR 1.1 0.95 0.96   APTT s 30.3  --   --      Lab Results - Last 18 Months   Lab Units 06/02/24  0610 " "05/29/24  0620 05/28/24  0525   AST (SGOT) U/L 13 15 13   ALT (SGPT) U/L 17 17 16   ALK PHOS U/L 85 72 78   BILIRUBIN mg/dL 0.3 0.3 0.3   TOTAL PROTEIN g/dL 7.0 6.7 6.7   ALBUMIN g/dL 4.0 3.6 3.7      No results for input(s): \"IRON\", \"TRANSFERRIN\", \"TIBC\", \"FERRITIN\", \"TNSLDEFB11\", \"FOLATE\" in the last 37620 hours.  No results for input(s): \"HAV\", \"HEPAIGM\", \"HEPBIGM\", \"HEPBCAB\", \"HBEAG\", \"HEPCAB\" in the last 72704 hours.    US renal bilateral 7/10/2024:  Negative for hydronephrosis or echogenic shadowing stone.  Electronically signed by Fercho Jackson MD.    CT abdomen pelvis with contrast 6/2/2024:  Recent colonoscopy.  Surgical clips in the proximal sigmoid colon presumably representing the site of the previous biopsy.  No acute bowel abnormality demonstrated.  Diffuse hepatic steatosis.  The left nephroureteral stent in good position.  Nonobstructing 0.3 cm left renal stone.  Diffuse nodularity of both adrenal glands, suspected to represent adrenal hyperplasia and/or adenomas.  Electronically signed by Anupam Latif MD.         Assessment and Plan              Diagnoses and all orders for this visit:    1. History of ischemic colitis (Primary)    2. Iron deficiency anemia, unspecified iron deficiency anemia type  -     CBC (No Diff); Future  -     ferrous sulfate 325 (65 FE) MG tablet; Take 1 tablet by mouth Daily With Breakfast.  Dispense: 90 tablet; Refill: 1    3. Fatty liver  -     AFP Tumor Marker; Future  -     Alpha - 1 - Antitrypsin Phenotype; Future  -     JANIYA; Future  -     Hepatic Function Panel; Future  -     Hepatitis Panel, Acute; Future  -     Iron Profile; Future  -     Ferritin; Future  -     Copper, Serum; Future  -     Protime-INR; Future  -     Ceruloplasmin; Future  -     Mitochondrial Antibodies, M2; Future  -     Anti-Smooth Muscle Antibody Titer; Future  -     KANNAN + PE; Future  -     CBC (No Diff); Future          * Surgery not found *    Follow Up     Follow Up   Return for follow up " after procedure.    Hepatic workup for hepatic steatosis.  Start Ferrous sulfate for low iron     Patient was given instructions and counseling regarding his condition or for health maintenance advice. Please see specific information pulled into the AVS if appropriate.

## 2024-09-04 ENCOUNTER — TELEPHONE (OUTPATIENT)
Dept: GASTROENTEROLOGY | Facility: CLINIC | Age: 49
End: 2024-09-04
Payer: MEDICAID

## 2024-09-24 ENCOUNTER — TELEPHONE (OUTPATIENT)
Dept: GASTROENTEROLOGY | Facility: CLINIC | Age: 49
End: 2024-09-24
Payer: MEDICAID

## 2024-10-11 ENCOUNTER — CLINICAL SUPPORT (OUTPATIENT)
Dept: FAMILY MEDICINE CLINIC | Facility: CLINIC | Age: 49
End: 2024-10-11
Payer: MEDICAID

## 2024-10-11 DIAGNOSIS — K76.0 FATTY LIVER: ICD-10-CM

## 2024-10-11 DIAGNOSIS — D50.9 IRON DEFICIENCY ANEMIA, UNSPECIFIED IRON DEFICIENCY ANEMIA TYPE: ICD-10-CM

## 2024-10-11 LAB
CERULOPLASMIN SERPL-MCNC: 23 MG/DL (ref 16–31)
DEPRECATED RDW RBC AUTO: 40 FL (ref 37–54)
ERYTHROCYTE [DISTWIDTH] IN BLOOD BY AUTOMATED COUNT: 13.2 % (ref 12.3–15.4)
FERRITIN SERPL-MCNC: 107 NG/ML (ref 30–400)
HAV IGM SERPL QL IA: NORMAL
HBV CORE IGM SERPL QL IA: NORMAL
HBV SURFACE AG SERPL QL IA: NORMAL
HCT VFR BLD AUTO: 42.8 % (ref 37.5–51)
HCV AB SER QL: NORMAL
HGB BLD-MCNC: 13.8 G/DL (ref 13–17.7)
INR PPP: 0.91 (ref 0.86–1.15)
IRON 24H UR-MRATE: 78 MCG/DL (ref 59–158)
IRON SATN MFR SERPL: 20 % (ref 20–50)
MCH RBC QN AUTO: 27 PG (ref 26.6–33)
MCHC RBC AUTO-ENTMCNC: 32.2 G/DL (ref 31.5–35.7)
MCV RBC AUTO: 83.8 FL (ref 79–97)
PLATELET # BLD AUTO: 288 10*3/MM3 (ref 140–450)
PMV BLD AUTO: 10.4 FL (ref 6–12)
PROTHROMBIN TIME: 12.4 SECONDS (ref 11.8–14.9)
RBC # BLD AUTO: 5.11 10*6/MM3 (ref 4.14–5.8)
TIBC SERPL-MCNC: 386 MCG/DL (ref 298–536)
TRANSFERRIN SERPL-MCNC: 259 MG/DL (ref 200–360)
WBC NRBC COR # BLD AUTO: 7.73 10*3/MM3 (ref 3.4–10.8)

## 2024-10-11 PROCEDURE — 83540 ASSAY OF IRON: CPT

## 2024-10-11 PROCEDURE — 82525 ASSAY OF COPPER: CPT

## 2024-10-11 PROCEDURE — 36415 COLL VENOUS BLD VENIPUNCTURE: CPT | Performed by: FAMILY MEDICINE

## 2024-10-11 PROCEDURE — 85610 PROTHROMBIN TIME: CPT

## 2024-10-11 PROCEDURE — 86015 ACTIN ANTIBODY EACH: CPT

## 2024-10-11 PROCEDURE — 85027 COMPLETE CBC AUTOMATED: CPT

## 2024-10-11 PROCEDURE — 86038 ANTINUCLEAR ANTIBODIES: CPT

## 2024-10-11 PROCEDURE — 82103 ALPHA-1-ANTITRYPSIN TOTAL: CPT

## 2024-10-11 PROCEDURE — 84466 ASSAY OF TRANSFERRIN: CPT

## 2024-10-11 PROCEDURE — 82728 ASSAY OF FERRITIN: CPT

## 2024-10-11 PROCEDURE — 82784 ASSAY IGA/IGD/IGG/IGM EACH: CPT

## 2024-10-11 PROCEDURE — 82390 ASSAY OF CERULOPLASMIN: CPT

## 2024-10-11 PROCEDURE — 80074 ACUTE HEPATITIS PANEL: CPT

## 2024-10-11 PROCEDURE — 82104 ALPHA-1-ANTITRYPSIN PHENO: CPT

## 2024-10-11 PROCEDURE — 84155 ASSAY OF PROTEIN SERUM: CPT

## 2024-10-11 PROCEDURE — 84165 PROTEIN E-PHORESIS SERUM: CPT

## 2024-10-11 PROCEDURE — 86334 IMMUNOFIX E-PHORESIS SERUM: CPT

## 2024-10-11 PROCEDURE — 86381 MITOCHONDRIAL ANTIBODY EACH: CPT

## 2024-10-11 NOTE — PROGRESS NOTES
Venipuncture Blood Specimen Collection  Venipuncture performed in LEFT ARM  by Tara Brasher with good hemostasis. Patient tolerated the procedure well without complications.   10/11/24   Tara Brasher

## 2024-10-12 LAB
MITOCHONDRIA M2 IGG SER-ACNC: <20 UNITS (ref 0–20)
SMA IGG SER-ACNC: 7 UNITS (ref 0–19)

## 2024-10-14 LAB
ALBUMIN SERPL ELPH-MCNC: 3.8 G/DL (ref 2.9–4.4)
ALBUMIN/GLOB SERPL: 1.2 {RATIO} (ref 0.7–1.7)
ALPHA1 GLOB SERPL ELPH-MCNC: 0.2 G/DL (ref 0–0.4)
ALPHA2 GLOB SERPL ELPH-MCNC: 0.8 G/DL (ref 0.4–1)
ANA SER QL: NEGATIVE
B-GLOBULIN SERPL ELPH-MCNC: 1.1 G/DL (ref 0.7–1.3)
GAMMA GLOB SERPL ELPH-MCNC: 1.1 G/DL (ref 0.4–1.8)
GLOBULIN SER-MCNC: 3.2 G/DL (ref 2.2–3.9)
IGA SERPL-MCNC: 266 MG/DL (ref 90–386)
IGG SERPL-MCNC: 1319 MG/DL (ref 603–1613)
IGM SERPL-MCNC: 84 MG/DL (ref 20–172)
INTERPRETATION SERPL IEP-IMP: NORMAL
LABORATORY COMMENT REPORT: NORMAL
M PROTEIN SERPL ELPH-MCNC: NORMAL G/DL
PROT SERPL-MCNC: 7 G/DL (ref 6–8.5)

## 2024-10-15 ENCOUNTER — HOSPITAL ENCOUNTER (OUTPATIENT)
Dept: GENERAL RADIOLOGY | Facility: HOSPITAL | Age: 49
Discharge: HOME | End: 2024-10-15
Payer: MEDICAID

## 2024-10-15 ENCOUNTER — TRANSCRIBE ORDERS (OUTPATIENT)
Dept: LAB | Facility: HOSPITAL | Age: 49
End: 2024-10-15
Payer: MEDICAID

## 2024-10-15 DIAGNOSIS — M25.562 PAIN IN BOTH KNEES, UNSPECIFIED CHRONICITY: ICD-10-CM

## 2024-10-15 DIAGNOSIS — M25.562 PAIN IN BOTH KNEES, UNSPECIFIED CHRONICITY: Primary | ICD-10-CM

## 2024-10-15 DIAGNOSIS — M25.561 PAIN IN BOTH KNEES, UNSPECIFIED CHRONICITY: Primary | ICD-10-CM

## 2024-10-15 DIAGNOSIS — M25.561 PAIN IN BOTH KNEES, UNSPECIFIED CHRONICITY: ICD-10-CM

## 2024-10-15 LAB — COPPER SERPL-MCNC: 84 UG/DL (ref 69–132)

## 2024-10-15 PROCEDURE — 73562 X-RAY EXAM OF KNEE 3: CPT

## 2024-10-17 LAB
A1AT PHENOTYP SERPL IFE: NORMAL
A1AT SERPL-MCNC: 160 MG/DL (ref 101–187)

## 2024-10-18 ENCOUNTER — TELEPHONE (OUTPATIENT)
Age: 49
End: 2024-10-18
Payer: MEDICAID

## 2024-10-18 NOTE — TELEPHONE ENCOUNTER
----- Message from Vandana Galo sent at 10/17/2024  4:19 PM EDT -----  I have reviewed the patient's most recent liver work-up which is normal ruling out underlying hepatitis, autoimmune, and genetic cause for fatty liver disease. Fatty liver on ultrasound is likely due to diet. Advise patient to maintain a healthy lifestyle: weight loss of 10%, cutting back on carbs, sugars, and fried fatty foods, limiting intake of soda and sugary drinks, and abstain from alcohol.

## 2024-10-24 ENCOUNTER — OFFICE VISIT (OUTPATIENT)
Dept: ORTHOPEDIC SURGERY | Facility: CLINIC | Age: 49
End: 2024-10-24
Payer: MEDICAID

## 2024-10-24 VITALS
OXYGEN SATURATION: 95 % | DIASTOLIC BLOOD PRESSURE: 83 MMHG | HEIGHT: 70 IN | BODY MASS INDEX: 40.94 KG/M2 | SYSTOLIC BLOOD PRESSURE: 154 MMHG | WEIGHT: 286 LBS | HEART RATE: 77 BPM

## 2024-10-24 DIAGNOSIS — M25.562 LEFT KNEE PAIN, UNSPECIFIED CHRONICITY: ICD-10-CM

## 2024-10-24 DIAGNOSIS — M25.561 RIGHT KNEE PAIN, UNSPECIFIED CHRONICITY: ICD-10-CM

## 2024-10-24 DIAGNOSIS — S83.249A TEAR OF MEDIAL MENISCUS OF KNEE, UNSPECIFIED LATERALITY, UNSPECIFIED TEAR TYPE, UNSPECIFIED WHETHER OLD OR CURRENT TEAR, INITIAL ENCOUNTER: Primary | ICD-10-CM

## 2024-10-24 NOTE — PROGRESS NOTES
"Chief Complaint  Pain and Initial Evaluation of the Left Knee and Initial Evaluation of the Right Knee       Subjective      Robert Valdez presents to [facility] for an evaluation  of his bilateral  knee. His knee's have been bothering him for a year and had gradually gotten worse. He locates his pain to the medial  aspect of his knee. He has a burning sensation and states he has popping and catching to his knee. He reports no specific injury, trauma, falls or prior surgery. He has been taking over the counter medications for pain control. He recently went to his family doctor where he had x-rays done on his left knee. He has occasional felling.     No Known Allergies     Social History     Socioeconomic History    Marital status:    Tobacco Use    Smoking status: Never    Smokeless tobacco: Never   Vaping Use    Vaping status: Never Used   Substance and Sexual Activity    Alcohol use: Not Currently    Drug use: Never    Sexual activity: Defer        I reviewed the patient's chief complaint, history of present illness, review of systems, past medical history, surgical history, family history, social history, medications, and allergy list.     Review of Systems     Constitutional: Denies fevers, chills, weight loss  Cardiovascular: Denies chest pain, shortness of breath  Skin: Denies rashes, acute skin changes  Neurologic: Denies headache, loss of consciousness  MSK: Bilateral knee pain       Vital Signs:   /83   Pulse 77   Ht 177.8 cm (70\")   Wt 130 kg (286 lb)   SpO2 95%   BMI 41.04 kg/m²            Ortho Exam    Physical Exam  General:Alert. No acute distress     Bilateral lower extremity: tender to the medial joint line, non tender to the lateral joint line, full extension, flexion to 130 degrees, mild crepitus with range of motion,  small effusion, no swelling, stable to varus/valgus stress, stable to anterior/posterior drawer , negative  Lachman's, pain " with Morena's, calf soft, distal neurovascularly intact, positive EHL, FHL, GS, and TA. Sensation intact to all 5 nerves of the foot.      Procedures    Imaging Results (Most Recent)       None             Result Review :       XR Knee 3 View Right    Result Date: 10/16/2024  Narrative: XR KNEE 3 VW LEFT, XR KNEE 3 VW RIGHT Date of Exam: 10/15/2024 12:42 PM EDT Indication: Bilateral knee pain. Comparison: Left knee radiographs dated 5/27/2024. Findings: Left knee: There is no acute fracture or dislocation. The joint space appear normally aligned and well maintained. There is minimal osteophyte formation within the medial and patellofemoral compartment. There is enthesopathy at the patellar tendon attachment both proximally and distally. There is a moderate joint effusion. Right knee: There is no acute fracture or dislocation. The joint spaces appear normally aligned and well maintained. There is no osseous erosion or chondrocalcinosis. There is no joint effusion. Bone mineralization is normal.     Impression: Impression: Left knee: No acute osseous abnormality. Moderate suprapatellar joint effusion. Enthesopathy at the patellar tendon attachments both proximally and distally. Right knee: No acute osseous abnormality or radiographic degenerative changes. No joint effusion. Electronically Signed: Phill Tatyana  10/16/2024 5:10 PM EDT  Workstation ID: [id number]    XR Knee 3 View Left    Result Date: 10/16/2024  Narrative: XR KNEE 3 VW LEFT, XR KNEE 3 VW RIGHT Date of Exam: 10/15/2024 12:42 PM EDT Indication: Bilateral knee pain. Comparison: Left knee radiographs dated 5/27/2024. Findings: Left knee: There is no acute fracture or dislocation. The joint space appear normally aligned and well maintained. There is minimal osteophyte formation within the medial and patellofemoral compartment. There is enthesopathy at the patellar tendon attachment both proximally and distally. There is a moderate joint effusion. Right  knee: There is no acute fracture or dislocation. The joint spaces appear normally aligned and well maintained. There is no osseous erosion or chondrocalcinosis. There is no joint effusion. Bone mineralization is normal.     Impression: Impression: Left knee: No acute osseous abnormality. Moderate suprapatellar joint effusion. Enthesopathy at the patellar tendon attachments both proximally and distally. Right knee: No acute osseous abnormality or radiographic degenerative changes. No joint effusion. Electronically Signed: Phill Joe  10/16/2024 5:10 PM EDT  Workstation ID: [id number]            Assessment and Plan     Diagnoses and all orders for this visit:    1. Tear of medial meniscus of knee, unspecified laterality, unspecified tear type, unspecified whether old or current tear, initial encounter (Primary)        The patient presents here today for an evaluation  of his bilateral knee.     MRI order placed today for bilateral knee to evaluate for internal derangement    Home exercises given today and will continue over the counter medications.     Call or return if worsening symptoms.    Follow Up     After MRI     Patient was given instructions and counseling regarding his condition or for health maintenance advice. Please see specific information pulled into the AVS if appropriate.     Scribed for Tal Skinner MD by Mayra Workman.  10/24/24   10:02 EDT    I have personally performed the services described in this document as scribed by the above individual and it is both accurate and complete. Tal Skinner MD 10/24/24

## 2024-11-12 ENCOUNTER — TELEPHONE (OUTPATIENT)
Dept: GASTROENTEROLOGY | Facility: CLINIC | Age: 49
End: 2024-11-12
Payer: MEDICAID

## 2024-11-12 NOTE — TELEPHONE ENCOUNTER
Blood Thinner Clearance request faxed to PCP for 11.25.24 procedure - awaiting response   There is a referral in the [facility] on Dr. Analia Harmon pt per Dr. Pieter Wakefield dx: SOB/Palpitations. Last seen in [facility] by [facility] on: 10/6/21. Scheduling advise please.

## 2024-11-14 NOTE — TELEPHONE ENCOUNTER
Called pt to enquire on BT - Chart noted 7.22.24 that patient was no longer taking Plavix    Pt states has been off of plavix for over 3 months - now taking 81mg aspirin    - Called Scheduling to update notes   -Emailed PAT to nissa

## 2024-11-15 NOTE — PRE-PROCEDURE INSTRUCTIONS
"Instructed on date and arrival time of 0600. Instructed that arrival time is not their procedure time but allows time to prepare for procedure.  Come to entrance \"C\". Must have  over age 18 to drive home.  May have two visitors; however, children under 12 must stay in waiting room.  Discussed clear liquid diet (no red or purple), bowel prep, and NPO.  May take medications as usual except for blood thinners, diabetic medications, and weight loss medications.  Verbalized understanding of instructions given.  Instructed to call for questions or concerns.  "

## 2024-11-22 ENCOUNTER — ANESTHESIA EVENT (OUTPATIENT)
Dept: GASTROENTEROLOGY | Facility: HOSPITAL | Age: 49
End: 2024-11-22
Payer: MEDICAID

## 2024-11-22 NOTE — ANESTHESIA PREPROCEDURE EVALUATION
Anesthesia Evaluation     Patient summary reviewed and Nursing notes reviewed   NPO Solid Status: > 8 hours  NPO Liquid Status: > 6 hours           Airway   Mallampati: II  TM distance: >3 FB  Neck ROM: full  No difficulty expected and Large neck circumference  Dental - normal exam     Pulmonary - normal exam    breath sounds clear to auscultation  (+) ,sleep apnea  Cardiovascular - normal exam  Exercise tolerance: good (4-7 METS)    ECG reviewed  Rhythm: regular  Rate: normal    (+) hypertension well controlled less than 2 medications      Neuro/Psych  GI/Hepatic/Renal/Endo    (+) obesity, morbid obesity, GI bleeding resolved, renal disease- stones, diabetes mellitus type 2 well controlled    Musculoskeletal     Abdominal   (+) obese    Abdomen: soft.   Substance History      OB/GYN          Other        ROS/Med Hx Other: EKG 11/25/24: HR 83,   Sinus rhythm  Abnormal R-wave progression, late transition    Hx ischemia colitis     Stopped GLP1 tablet yesterday.                 Anesthesia Plan    ASA 3     general   total IV anesthesia  (Total IV Anesthesia    Patient understands anesthesia not responsible for dental damage.  )  intravenous induction     Anesthetic plan, risks, benefits, and alternatives have been provided, discussed and informed consent has been obtained with: patient.  Pre-procedure education provided  Plan discussed with CRNA.      CODE STATUS:

## 2024-11-25 ENCOUNTER — ANESTHESIA (OUTPATIENT)
Dept: GASTROENTEROLOGY | Facility: HOSPITAL | Age: 49
End: 2024-11-25
Payer: MEDICAID

## 2024-11-25 ENCOUNTER — HOSPITAL ENCOUNTER (OUTPATIENT)
Facility: HOSPITAL | Age: 49
Setting detail: HOSPITAL OUTPATIENT SURGERY
Discharge: HOME | End: 2024-11-25
Attending: INTERNAL MEDICINE | Admitting: INTERNAL MEDICINE
Payer: MEDICAID

## 2024-11-25 VITALS
TEMPERATURE: 97.3 F | DIASTOLIC BLOOD PRESSURE: 93 MMHG | WEIGHT: 298.5 LBS | HEART RATE: 80 BPM | OXYGEN SATURATION: 100 % | RESPIRATION RATE: 20 BRPM | BODY MASS INDEX: 42.83 KG/M2 | SYSTOLIC BLOOD PRESSURE: 143 MMHG

## 2024-11-25 LAB — GLUCOSE BLDC GLUCOMTR-MCNC: 130 MG/DL (ref 70–99)

## 2024-11-25 PROCEDURE — 25010000002 LIDOCAINE PF 2% 2 % SOLUTION: Performed by: NURSE ANESTHETIST, CERTIFIED REGISTERED

## 2024-11-25 PROCEDURE — 25810000003 LACTATED RINGERS PER 1000 ML: Performed by: NURSE ANESTHETIST, CERTIFIED REGISTERED

## 2024-11-25 PROCEDURE — 25010000002 PROPOFOL 10 MG/ML EMULSION: Performed by: NURSE ANESTHETIST, CERTIFIED REGISTERED

## 2024-11-25 PROCEDURE — 82948 REAGENT STRIP/BLOOD GLUCOSE: CPT

## 2024-11-25 PROCEDURE — 93005 ELECTROCARDIOGRAM TRACING: CPT | Performed by: NURSE ANESTHETIST, CERTIFIED REGISTERED

## 2024-11-25 RX ORDER — SODIUM CHLORIDE, SODIUM LACTATE, POTASSIUM CHLORIDE, CALCIUM CHLORIDE 600; 310; 30; 20 MG/100ML; MG/100ML; MG/100ML; MG/100ML
30 INJECTION, SOLUTION INTRAVENOUS CONTINUOUS
Status: DISCONTINUED | OUTPATIENT
Start: 2024-11-25 | End: 2024-11-25 | Stop reason: HOSPADM

## 2024-11-25 RX ORDER — LIDOCAINE HYDROCHLORIDE 20 MG/ML
INJECTION, SOLUTION EPIDURAL; INFILTRATION; INTRACAUDAL; PERINEURAL AS NEEDED
Status: DISCONTINUED | OUTPATIENT
Start: 2024-11-25 | End: 2024-11-25 | Stop reason: SURG

## 2024-11-25 RX ORDER — PROPOFOL 10 MG/ML
VIAL (ML) INTRAVENOUS AS NEEDED
Status: DISCONTINUED | OUTPATIENT
Start: 2024-11-25 | End: 2024-11-25 | Stop reason: SURG

## 2024-11-25 RX ORDER — SODIUM CHLORIDE, SODIUM LACTATE, POTASSIUM CHLORIDE, CALCIUM CHLORIDE 600; 310; 30; 20 MG/100ML; MG/100ML; MG/100ML; MG/100ML
INJECTION, SOLUTION INTRAVENOUS CONTINUOUS PRN
Status: DISCONTINUED | OUTPATIENT
Start: 2024-11-25 | End: 2024-11-25 | Stop reason: SURG

## 2024-11-25 RX ADMIN — PROPOFOL 50 MG: 10 INJECTION, EMULSION INTRAVENOUS at 07:52

## 2024-11-25 RX ADMIN — PROPOFOL 50 MG: 10 INJECTION, EMULSION INTRAVENOUS at 07:50

## 2024-11-25 RX ADMIN — SODIUM CHLORIDE, POTASSIUM CHLORIDE, SODIUM LACTATE AND CALCIUM CHLORIDE: 600; 310; 30; 20 INJECTION, SOLUTION INTRAVENOUS at 07:48

## 2024-11-25 RX ADMIN — PROPOFOL 333 MCG/KG/MIN: 10 INJECTION, EMULSION INTRAVENOUS at 07:50

## 2024-11-25 RX ADMIN — LIDOCAINE HYDROCHLORIDE 100 MG: 20 INJECTION, SOLUTION INTRAVENOUS at 07:50

## 2024-11-25 NOTE — ANESTHESIA POSTPROCEDURE EVALUATION
Patient: Robert Valdez    Procedure Summary       Date: 11/25/24 Room / Location: [facility] ENDOSCOPY 5 / [facility] ENDOSCOPY    Anesthesia Start: 0746 Anesthesia Stop: 0825    Procedure: COLONOSCOPY Diagnosis:       Ischemic colitis      (Ischemic colitis [K55.9])    Surgeons: Axel Zaragoza MD Provider: Batsheva Sadler CRNA    Anesthesia Type: general ASA Status: 3            Anesthesia Type: general    Vitals  Vitals Value Taken Time   /93 11/25/24 0837   Temp 36.3 °C (97.3 °F) 11/25/24 0818   Pulse 94 11/25/24 0838   Resp 20 11/25/24 0837   SpO2 100 % 11/25/24 0837   Vitals shown include unfiled device data.        Post Anesthesia Care and Evaluation    Post-procedure mental status: acceptable.  Pain management: satisfactory to patient    Airway patency: patent  Anesthetic complications: No anesthetic complications    Cardiovascular status: acceptable  Respiratory status: acceptable    Comments: Per chart review

## 2024-11-25 NOTE — H&P
Pre Procedure History & Physical    Chief Complaint: Colonoscopy    HPI: Patient is 49 years old morbidly obese male with known history of hypertension and diabetes melitis underwent EGD and colonoscopy due to bleeding per rectum on 05/25/2024.  The colonoscopy findings and pathology report was consistent with ischemic colitis.  Patient is here for follow-up colonoscopy to evaluate for healing.  He denies any symptoms.    Past Medical History:   Past Medical History:   Diagnosis Date    Diabetes mellitus     Hypertension        Past Surgical History:  Past Surgical History:   Procedure Laterality Date    COLONOSCOPY N/A 5/25/2024    Procedure: COLONOSCOPY WITH BIOPSIES AND CLIP APPLICATION X 2;  Surgeon: Axel Zaragoza MD;  Location: [facility];  Service: Gastroenterology;  Laterality: N/A;  COLON ERROSION, COLITIS WITH ULCERATION    ENDOSCOPY N/A 5/25/2024    Procedure: ESOPHAGOGASTRODUODENOSCOPY;  Surgeon: Axel Zaragoza MD;  Location: [facility];  Service: Gastroenterology;  Laterality: N/A;  MILD GASTRITIS    URETEROSCOPY LASER LITHOTRIPSY WITH STENT INSERTION Left 5/27/2024    Procedure: Cystoscopy with left ureteroscopy with laser and left ureteral left ureteral stent placement.;  Surgeon: Omid Bardales MD;  Location: [facility];  Service: Urology;  Laterality: Left;       Family History:  Family History   Problem Relation Age of Onset    Colon cancer Neg Hx        Social History:   reports that he has never smoked. He has never used smokeless tobacco. He reports that he does not currently use alcohol. He reports that he does not use drugs.    Medications:   Medications Prior to Admission   Medication Sig Dispense Refill Last Dose/Taking    amLODIPine (NORVASC) 10 MG tablet Take 1 tablet by mouth Daily.   11/24/2024    aspirin 81 MG EC tablet Take 1 tablet by mouth Daily. 30 tablet 0 11/24/2024    ferrous sulfate 325 (65 FE) MG tablet Take 1 tablet by mouth Daily With Breakfast. 90  tablet 1 11/24/2024    losartan-hydrochlorothiazide (HYZAAR) 100-25 MG per tablet Take 1 tablet by mouth Daily.   11/24/2024    metoprolol succinate XL (TOPROL-XL) 50 MG 24 hr tablet Take 1 tablet by mouth Every Evening.   11/24/2024    pantoprazole (PROTONIX) 40 MG EC tablet Take 1 tablet by mouth Daily.   11/24/2024    Semaglutide 14 MG tablet Take 1 tablet by mouth Daily.   11/24/2024    tamsulosin (FLOMAX) 0.4 MG capsule 24 hr capsule Take 1 capsule by mouth Daily. 30 capsule 0 11/24/2024    vitamin D (ERGOCALCIFEROL) 1.25 MG (47520 UT) capsule capsule Take 1 tablet by mouth 1 (One) Time Per Week.   Past Week    PEG 3350 17 GM/SCOOP powder Take 17 g by mouth Daily. (Patient not taking: Reported on 7/22/2024)       polyethylene glycol (GoLYTELY) 236 g solution Mix solution.  Drink 2/3 of solution at 6:00 PM on the evening prior to procedure.  Drink remaining 1/3 of solution four hours prior to the scheduled arrival-time on the morning of the procedure. (Patient not taking: Reported on 7/22/2024) 4000 mL 0 Not Taking       Allergies:  Patient has no known allergies.      Objective     Blood pressure 135/87, pulse 73, temperature 98 °F (36.7 °C), temperature source Temporal, resp. rate 18, weight 135 kg (298 lb 8.1 oz), SpO2 96%.    Physical Exam   Constitutional: Pt is oriented to person, place, and time and well-developed, well-nourished, and in no distress.   Mouth/Throat: Oropharynx is clear and moist.   Neck: Normal range of motion.   Cardiovascular: Normal rate, regular rhythm and normal heart sounds.    Pulmonary/Chest: Effort normal and breath sounds normal.   Abdominal: Soft. Nontender  Skin: Skin is warm and dry.   Psychiatric: Mood, memory, affect and judgment normal.     Assessment & Plan     Diagnosis:    History of ischemic colitis    Anticipated Surgical Procedure:    Colonoscopy    The risks, benefits, and alternatives of this procedure have been discussed with the patient or the responsible party-  the patient understands and agrees to proceed.        Electronically signed by Axel Zaragoza MD, 11/25/24, 7:40 AM EST.

## 2024-11-26 ENCOUNTER — HOSPITAL ENCOUNTER (OUTPATIENT)
Dept: MRI IMAGING | Facility: HOSPITAL | Age: 49
Discharge: HOME | End: 2024-11-26
Payer: MEDICAID

## 2024-11-26 ENCOUNTER — TELEPHONE (OUTPATIENT)
Dept: GASTROENTEROLOGY | Facility: CLINIC | Age: 49
End: 2024-11-26
Payer: MEDICAID

## 2024-11-26 ENCOUNTER — APPOINTMENT (OUTPATIENT)
Dept: MRI IMAGING | Facility: HOSPITAL | Age: 49
End: 2024-11-26
Payer: MEDICAID

## 2024-11-26 DIAGNOSIS — S83.249A TEAR OF MEDIAL MENISCUS OF KNEE, UNSPECIFIED LATERALITY, UNSPECIFIED TEAR TYPE, UNSPECIFIED WHETHER OLD OR CURRENT TEAR, INITIAL ENCOUNTER: ICD-10-CM

## 2024-11-26 DIAGNOSIS — M25.562 LEFT KNEE PAIN, UNSPECIFIED CHRONICITY: ICD-10-CM

## 2024-11-26 DIAGNOSIS — M25.561 RIGHT KNEE PAIN, UNSPECIFIED CHRONICITY: ICD-10-CM

## 2024-11-26 PROCEDURE — 73721 MRI JNT OF LWR EXTRE W/O DYE: CPT

## 2024-11-27 LAB
QT INTERVAL: 397 MS
QTC INTERVAL: 468 MS

## 2024-12-02 ENCOUNTER — OFFICE VISIT (OUTPATIENT)
Dept: GASTROENTEROLOGY | Facility: CLINIC | Age: 49
End: 2024-12-02
Payer: MEDICAID

## 2024-12-02 VITALS
SYSTOLIC BLOOD PRESSURE: 150 MMHG | BODY MASS INDEX: 43.62 KG/M2 | WEIGHT: 304 LBS | DIASTOLIC BLOOD PRESSURE: 98 MMHG | OXYGEN SATURATION: 96 % | HEART RATE: 81 BPM

## 2024-12-02 DIAGNOSIS — K76.0 FATTY LIVER: Primary | ICD-10-CM

## 2024-12-02 DIAGNOSIS — K21.9 GASTROESOPHAGEAL REFLUX DISEASE, UNSPECIFIED WHETHER ESOPHAGITIS PRESENT: ICD-10-CM

## 2024-12-02 PROCEDURE — 3080F DIAST BP >= 90 MM HG: CPT

## 2024-12-02 PROCEDURE — 3077F SYST BP >= 140 MM HG: CPT

## 2024-12-02 PROCEDURE — 1160F RVW MEDS BY RX/DR IN RCRD: CPT

## 2024-12-02 PROCEDURE — 99214 OFFICE O/P EST MOD 30 MIN: CPT

## 2024-12-02 PROCEDURE — 1159F MED LIST DOCD IN RCRD: CPT

## 2024-12-02 RX ORDER — POTASSIUM CHLORIDE 1500 MG/1
1 TABLET, EXTENDED RELEASE ORAL DAILY
COMMUNITY
Start: 2024-11-26 | End: 2025-02-24

## 2024-12-02 NOTE — PROGRESS NOTES
"Chief Complaint     Colonoscopy and Follow-up    History of Present Illness     Robert Valdez is a 49 y.o. male who presents to [facility] for follow-up after he colonoscopy.    History of present illness:  Patient establish care normal on 7/22/2024 for history of ischemic colitis, iron deficiency anemia, and fatty liver. Denies any nausea vomiting, or heartburn. Denies abdominal pain, tarry stools or bright red blood in stool.  Patient reports having 1-2 a BM/day, no straining, formed soft brown.      Negative family history of colon cancer or colon polyps.  Last colonoscopy 5/25/2024 Linear mucosal ulcerations from this rectum up to the descending colon and the sigmoid colon deeply ulcerated mucosa with sloughing and deep laceration 2 hemostatic clips placed.  Random colon biopsy showed ischemic colitis.  Repeat colonoscopy in 6 months (colonoscopy scheduled 11/25/2024). EGD 5/25/2024 erythematous mucosa in the gastric body noted.    11/25/2024 Colonoscopy performed by Dr. Zaragoza. The report showed a normal colonoscopy.  No polyps removed or specimens collected.  Repeat colonoscopy in 10 years.     12/2/2024 Office visit: Patient explains that he has made dietary changes after our last visit. He has decreased soft drink intake to only 2 a month, as well as decreasing intake of sugars, carbs, and red meats. He reports some frustration due to continued to gain weight (+6 lbs since last visit). 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 He has tried Ozempic but had to discontinue due to increased constipation and diarrhea. He is now on oral Semaglutide 14 mg. This is his second month on the oral Semaglutide but only the second week of the higher dose. Denies RUQ pain, alcohol use, IV drug use, unprofessional tattoos, history of or exposure to hepatitis, history of " blood transfusions, and family history of liver disease.     No reports of nausea, vomiting, or hematemesis. Denies dysphagia. Reports heartburn is mostly controlled with Protonix but would like to try to come off the medication. Denies hematochezia, melena, or abdominal pain.      History      Past Medical History:   Diagnosis Date    Diabetes mellitus     Hypertension      Past Surgical History:   Procedure Laterality Date    COLONOSCOPY N/A 5/25/2024    Procedure: COLONOSCOPY WITH BIOPSIES AND CLIP APPLICATION X 2;  Surgeon: Axel Zaragoza MD;  Location: [facility] MAIN OR;  Service: Gastroenterology;  Laterality: N/A;  COLON ERROSION, COLITIS WITH ULCERATION    COLONOSCOPY N/A 11/25/2024    Procedure: COLONOSCOPY;  Surgeon: Axel Zaragoza MD;  Location: [facility] ENDOSCOPY;  Service: Gastroenterology;  Laterality: N/A;  NORMAL COLONOSCOPY    ENDOSCOPY N/A 5/25/2024    Procedure: ESOPHAGOGASTRODUODENOSCOPY;  Surgeon: Axel Zaragoaz MD;  Location: [facility] MAIN OR;  Service: Gastroenterology;  Laterality: N/A;  MILD GASTRITIS    URETEROSCOPY LASER LITHOTRIPSY WITH STENT INSERTION Left 5/27/2024    Procedure: Cystoscopy with left ureteroscopy with laser and left ureteral left ureteral stent placement.;  Surgeon: Omid Bardales MD;  Location: [facility] OR;  Service: Urology;  Laterality: Left;     Family History   Problem Relation Age of Onset    Colon cancer Neg Hx         Current Medications       Current Outpatient Medications:     amLODIPine (NORVASC) 10 MG tablet, Take 1 tablet by mouth Daily., Disp: , Rfl:     aspirin 81 MG EC tablet, Take 1 tablet by mouth Daily., Disp: 30 tablet, Rfl: 0    ferrous sulfate 325 (65 FE) MG tablet, Take 1 tablet by mouth Daily With Breakfast., Disp: 90 tablet, Rfl: 1    losartan-hydrochlorothiazide (HYZAAR) 100-25 MG per tablet, Take 1 tablet by mouth Daily., Disp: , Rfl:     metoprolol succinate XL (TOPROL-XL) 50 MG 24 hr tablet, Take 1 tablet by mouth Every Evening.,  Disp: , Rfl:     pantoprazole (PROTONIX) 40 MG EC tablet, Take 1 tablet by mouth Daily., Disp: , Rfl:     potassium chloride ER (K-TAB) 20 MEQ tablet controlled-release ER tablet, Take 1 tablet by mouth Daily., Disp: , Rfl:     Semaglutide 14 MG tablet, Take 1 tablet by mouth Daily., Disp: , Rfl:     tamsulosin (FLOMAX) 0.4 MG capsule 24 hr capsule, Take 1 capsule by mouth Daily., Disp: 30 capsule, Rfl: 0    vitamin D (ERGOCALCIFEROL) 1.25 MG (96328 UT) capsule capsule, Take 1 tablet by mouth 1 (One) Time Per Week., Disp: , Rfl:      Allergies     No Known Allergies    Social History       Social History     Social History Narrative    Not on file         Objective       /98 (BP Location: Left arm, Patient Position: Sitting, Cuff Size: Large Adult)   Pulse 81   Wt (!) 138 kg (304 lb)   SpO2 96%   BMI 43.62 kg/m²       Physical Exam  HENT:      Head: Normocephalic.   Cardiovascular:      Rate and Rhythm: Normal rate.   Pulmonary:      Effort: Pulmonary effort is normal.   Abdominal:      General: Bowel sounds are normal.   Musculoskeletal:         General: Normal range of motion.   Neurological:      General: No focal deficit present.      Mental Status: He is alert.   Psychiatric:         Mood and Affect: Mood normal.         Results       Result Review :    The following data was reviewed by: ADRIAN Maurer on 12/02/2024:    Lab Results - Last 18 Months   Lab Units 10/11/24  1024 06/20/24  0840 06/02/24  0610   WBC 10*3/mm3 7.73 7.46 10.73   HEMOGLOBIN g/dL 13.8 11.6* 12.4*   MCV fL 83.8 85.2 85.6   PLATELETS 10*3/mm3 288 291 271         Lab Results - Last 18 Months   Lab Units 06/02/24  0610 05/29/24  0620 05/28/24  0525   BUN mg/dL 26* 23* 21*   CREATININE mg/dL 1.48* 1.65* 1.74*   SODIUM mmol/L 139 136 136   POTASSIUM mmol/L 3.3* 3.3* 3.4*   CHLORIDE mmol/L 100 97* 97*   CO2 mmol/L 26.6 29.8* 29.1*   GLUCOSE mg/dL 150* 112* 122*      Lab Results - Last 18 Months   Lab Units  10/11/24  1024 06/20/24  0840 06/02/24  0610   PROTIME Seconds 12.4 11.9 12.8   INR  0.91 1.1 0.95   APTT s  --  30.3  --      Lab Results - Last 18 Months   Lab Units 10/11/24  1024 06/02/24  0610 05/29/24  0620 05/28/24  0525   AST (SGOT) U/L  --  13 15 13   ALT (SGPT) U/L  --  17 17 16   ALK PHOS U/L  --  85 72 78   BILIRUBIN mg/dL  --  0.3 0.3 0.3   TOTAL PROTEIN g/dL  --  7.0 6.7 6.7   ALBUMIN g/dL 3.8 4.0 3.6 3.7      Lab Results - Last 18 Months   Lab Units 10/11/24  1024   IRON mcg/dL 78   TRANSFERRIN mg/dL 259   TIBC mcg/dL 386   FERRITIN ng/mL 107.00     Lab Results - Last 18 Months   Lab Units 10/11/24  1024   HEP A IGM  Non-Reactive       MRI Knee Right Without Contrast    Result Date: 11/29/2024  Impression: Right knee MRI. Subtle longitudinal oblique tear of the posterior horn of the medial meniscus. Chondromalacia and areas of mild to moderate-grade chondral loss in the patellofemoral compartment. Small knee joint effusion. Small Baker cyst. Electronically Signed: Oswaldo Sam MD  11/29/2024 9:09 AM EST  Workstation ID: [id number]    MRI Knee Left Without Contrast    Result Date: 11/29/2024  Impression: Left knee MRI. Longitudinal horizontal tear of the body and posterior horn of the medial meniscus with likely small displaced meniscal flap tear of the meniscal body. Chondromalacia and areas of low to moderate-grade chondral irregularity and fissuring in the patellofemoral compartment. Small knee joint effusion. Small Baker cyst. Electronically Signed: Oswaldo Sam MD  11/29/2024 9:02 AM EST  Workstation ID: [id number]    XR Knee 3 View Right    Result Date: 10/16/2024  Impression: Left knee: No acute osseous abnormality. Moderate suprapatellar joint effusion. Enthesopathy at the patellar tendon attachments both proximally and distally. Right knee: No acute osseous abnormality or radiographic degenerative changes. No joint effusion. Electronically Signed: Phill Joe  10/16/2024 5:10 PM EDT   Workstation ID: [id number]    XR Knee 3 View Left    Result Date: 10/16/2024  Impression: Left knee: No acute osseous abnormality. Moderate suprapatellar joint effusion. Enthesopathy at the patellar tendon attachments both proximally and distally. Right knee: No acute osseous abnormality or radiographic degenerative changes. No joint effusion. Electronically Signed: Phill Morganelor  10/16/2024 5:10 PM EDT  Workstation ID: [id number]           Assessment and Plan              Diagnoses and all orders for this visit:    1. Fatty liver (Primary)  -     CBC (No Diff); Future  -     Hepatic Function Panel; Future  -     Protime-INR; Future  -     US Liver; Future    2. BMI 40.0-44.9, adult    3. Gastroesophageal reflux disease, unspecified whether esophagitis present          * Surgery not found *    Follow Up     Follow Up   Return in about 6 months (around 6/2/2025) for GERD, Fatty Liver Disease.    Advised patient to maintain a healthy lifestyle: weight loss of 10%, cutting back on carbs, sugars, and fried fatty foods, limiting intake of soda and sugary drinks, and abstain from alcohol.   Follow up Liver US  and Labs in 6 months. Recommend 2 to 3 cups of black coffee a day. Advise patient to go on daily walks with 10,000 steps daily (as recommended for patients with NAFLD/FISHMAN).   Advised patient to trial a week off the Protonix and can use Pepcid 40 mg OTC for heartburn. If heartburn returns advised patient to resume Protonix and discontinue Pepcid.    Patient was given instructions and counseling regarding his condition or for health maintenance advice. Please see specific information pulled into the AVS if appropriate.

## 2024-12-05 ENCOUNTER — TELEPHONE (OUTPATIENT)
Dept: ORTHOPEDIC SURGERY | Facility: CLINIC | Age: 49
End: 2024-12-05
Payer: MEDICAID

## 2024-12-05 ENCOUNTER — OFFICE VISIT (OUTPATIENT)
Dept: ORTHOPEDIC SURGERY | Facility: CLINIC | Age: 49
End: 2024-12-05
Payer: MEDICAID

## 2024-12-05 ENCOUNTER — PREP FOR SURGERY (OUTPATIENT)
Dept: OTHER | Facility: HOSPITAL | Age: 49
End: 2024-12-05
Payer: MEDICAID

## 2024-12-05 VITALS
WEIGHT: 285 LBS | BODY MASS INDEX: 40.8 KG/M2 | HEART RATE: 82 BPM | DIASTOLIC BLOOD PRESSURE: 106 MMHG | HEIGHT: 70 IN | OXYGEN SATURATION: 95 % | SYSTOLIC BLOOD PRESSURE: 161 MMHG

## 2024-12-05 DIAGNOSIS — S83.249A TEAR OF MEDIAL MENISCUS OF KNEE, UNSPECIFIED LATERALITY, UNSPECIFIED TEAR TYPE, UNSPECIFIED WHETHER OLD OR CURRENT TEAR, INITIAL ENCOUNTER: Primary | ICD-10-CM

## 2024-12-05 DIAGNOSIS — M17.11 ARTHRITIS OF KNEE, RIGHT: ICD-10-CM

## 2024-12-05 DIAGNOSIS — M25.562 LEFT KNEE PAIN, UNSPECIFIED CHRONICITY: ICD-10-CM

## 2024-12-05 DIAGNOSIS — M17.12 ARTHRITIS OF KNEE, LEFT: ICD-10-CM

## 2024-12-05 DIAGNOSIS — M25.561 RIGHT KNEE PAIN, UNSPECIFIED CHRONICITY: Primary | ICD-10-CM

## 2024-12-05 DIAGNOSIS — M25.561 RIGHT KNEE PAIN, UNSPECIFIED CHRONICITY: ICD-10-CM

## 2024-12-05 DIAGNOSIS — S83.249A TEAR OF MEDIAL MENISCUS OF KNEE, UNSPECIFIED LATERALITY, UNSPECIFIED TEAR TYPE, UNSPECIFIED WHETHER OLD OR CURRENT TEAR, INITIAL ENCOUNTER: ICD-10-CM

## 2024-12-05 NOTE — PROGRESS NOTES
"Chief Complaint  Follow-up of the Left Knee and Follow-up of the Right Knee    Subjective          History of Present Illness      Robert Valdez is a 49 y.o. male  presents to [facility] for     Patient presents for follow-up evaluation of bilateral knee pain and to review bilateral knee MRIs.  He states the right knee is more painful than the left.  He saw Dr. Skinner for evaluation on 10/24/2024 after seeing his primary care physician.  He had injury to his left knee over a year and a half ago he states about 3 months ago his right knee was injured he admits to popping and catching sensation and pain he states the pain is intolerable and is causing him trouble with activities of daily living he would like to have treatment options discussed today with MRI results.      No Known Allergies     Social History     Socioeconomic History    Marital status:    Tobacco Use    Smoking status: Never    Smokeless tobacco: Never   Vaping Use    Vaping status: Never Used   Substance and Sexual Activity    Alcohol use: Not Currently    Drug use: Never    Sexual activity: Defer        REVIEW OF SYSTEMS    Constitutional: Awake alert and oriented x3, no acute distress, denies fevers, chills, weight loss  Respiratory: No respiratory distress  Vascular: Brisk cap refill, Intact distal pulses, No cyanosis, compartments soft with no signs or symptoms of compartment syndrome or DVT.   Cardiovascular: Denies chest pain, shortness of breath  Skin: Denies rashes, acute skin changes  Neurologic: Denies headache, loss of consciousness  MSK: Bilateral knee pain      Objective   Vital Signs:   BP (!) 161/106 Comment: PROVIDER AWARE. PCP MONITORS BP  Pulse 82   Ht 177.8 cm (70\")   Wt 129 kg (285 lb)   SpO2 95%   BMI 40.89 kg/m²     Body mass index is 40.89 kg/m².    Physical Exam       Bilateral lower extremity: tender to the medial joint line, non tender to the lateral joint line, full " extension, flexion to 130 degrees, mild crepitus with range of motion,  small effusion, no swelling, stable to varus/valgus stress, stable to anterior/posterior drawer , negative  Lachman's, pain with Morena's, calf soft, distal neurovascularly intact, positive EHL, FHL, GS, and TA. Sensation intact to all 5 nerves of the foot.           Procedures    Imaging Results (Most Recent)       None         MRI Knee Right Without Contrast    Result Date: 11/29/2024  Narrative: MRI KNEE RIGHT  WO CONTRAST Date of Exam: 11/26/2024 3:00 PM EST Indication: pain.  Comparison: Right knee radiographs 10/15/2024 Technique:  Routine multiplanar/multisequence images of the RIGHT knee were obtained without contrast administration. Findings: There is subtle increased signal within the posterior horn of the medial meniscus compatible with longitudinal oblique tear (series 10 image 17, series 11 image 8). The articular cartilage of the medial compartment appears grossly preserved without high-grade chondral loss. The medial supporting structures are normal. The lateral meniscus is normal. The lateral supporting structures are unremarkable. The articular cartilage of the lateral compartment appears grossly preserved. The anterior cruciate ligament and posterior cruciate ligament are normal. Small cruciate ganglion cysts are seen in the proximal ACL fibers. The quadriceps tendon and patellar tendon are normal. There is diffuse chondral thinning and chondromalacia of the patellar articular cartilage with areas of moderate-grade chondral loss and chondral fissuring along the median patellar ridge and lateral patellar facet. There is chondromalacia and low to moderate-grade chondral loss and fissuring of the central trochlea (series 12 image 17). There is a small knee joint effusion. There is a small, thin Baker cyst. The posterior knee neurovasculature is unremarkable. Normal appearance of the muscles and subcutaneous tissues. No focal  bony lesion. No fracture or bony contusion.     Impression: Impression: Right knee MRI. Subtle longitudinal oblique tear of the posterior horn of the medial meniscus. Chondromalacia and areas of mild to moderate-grade chondral loss in the patellofemoral compartment. Small knee joint effusion. Small Baker cyst. Electronically Signed: Oswaldo Sam MD  11/29/2024 9:09 AM EST  Workstation ID: [id number]    MRI Knee Left Without Contrast    Result Date: 11/29/2024  Narrative: MRI KNEE LEFT  WO CONTRAST Date of Exam: 11/26/2024 3:00 PM EST Indication: pain.  Comparison: Left knee x-rays 10/15/2024 Technique:  Routine multiplanar/multisequence images of the LEFT knee were obtained without contrast administration. Findings: There is longitudinal horizontal tearing of the body and posterior horn of the medial meniscus (series 11 image 6). There is a questionable small displaced meniscal flap tear of the medial meniscal body extending into the femoral gutter (series 10 image 13, series 8 image 17). There is mild diffuse chondral thinning throughout the central weightbearing aspect of the medial compartment with areas of low-grade chondral fissuring of the medial femoral condyle and medial tibial plateau. The medial supporting structures are unremarkable. Normal appearance of the lateral meniscus. The articular cartilage of the lateral compartment appears grossly preserved. The lateral supporting structures are unremarkable. The anterior cruciate ligament and posterior cruciate ligament are normal. The quadriceps and patellar tendons appear grossly unremarkable. There is some ossification within the distal patellar tendon at the tibial tuberosity compatible with sequelae of old Osgood-Schlatter's disease. There is some chondromalacia and low-grade chondral fissuring of the patellar articular cartilage at the median patellar ridge and lateral patellar facet. There is low to moderate-grade chondral loss and chondral  irregularity/fissuring of the central trochlear cartilage. There is a small knee joint effusion. There is a small, multilobulated Baker cyst. The posterior knee neurovasculature appears unremarkable. Normal appearance of the muscles and subcutaneous tissues. No focal bony lesion. No fracture or bony contusion.     Impression: Impression: Left knee MRI. Longitudinal horizontal tear of the body and posterior horn of the medial meniscus with likely small displaced meniscal flap tear of the meniscal body. Chondromalacia and areas of low to moderate-grade chondral irregularity and fissuring in the patellofemoral compartment. Small knee joint effusion. Small Baker cyst. Electronically Signed: Oswaldo Sam MD  11/29/2024 9:02 AM EST  Workstation ID: [id number]       Result Review :   The following data was reviewed by: HOMAR Tee on 12/05/2024:  Data reviewed : Radiologic studies reviewed by myself and Dr. Skinner with the patient              Assessment and Plan    Diagnoses and all orders for this visit:    1. Tear of medial meniscus of knee, unspecified laterality, unspecified tear type, unspecified whether old or current tear, initial encounter (Primary)    2. Right knee pain, unspecified chronicity    3. Left knee pain, unspecified chronicity    4. Arthritis of knee, right    5. Arthritis of knee, left        Reviewed MRIs with the patient discussed diagnosis and treatment options with him Dr. Skinner also met with him discussed risk benefits procedure recovery of right and left knee arthroscopic partial medial meniscectomy chondroplasty and debridements.  Patient elected to have both knees done at the same time per his request this was discussed with him and he agreed to have this scheduled due to his work schedule he has downtime now through February and he would like to have both knees done at the same time Dr. Skinner agreed to do this follow-up 2 weeks postop    Discussed surgery.,  Risks/benefits discussed with patient including, but not limited to: infection, bleeding, neurovascular damage, re-rupture, aesthetic deformity, need for further surgery, and death., Risks/benefits discussed with patient including, but not limited to: infection, bleeding, neurovascular damage, malunion, nonunion, aesthetic deformity, need for further surgery, and death., Discussed with patient the implant type being used during surgery and patient understands., Surgery pamphlet given., Call or return if worsening symptoms., DME order for a 3 in 1 given today due to patient will be confined to one room/level of the home that does not offer a toilet during postop recovery. , I am prescribing  the Omero® Pro2.0 sustained acoustic medicine device for the acceleration of soft tissue repair and reduction of pain. , I am ordering the use of the Nice1 cold therapy machine for 60 days post-op as part of an opioid-sparing approach to help manage pain and edema.  I feel this is medically necessary for the best care for this patient.   , IFC can help extend pain relief and hopefully reduce need for pain medication. TENS is used to control break through pain. NMES is used to help and strengthen weak muscles and prevent atrophy., Provider is aware of patient's metal allergy. , and Patient does not have any metal allergies.     Follow Up   Return for 2 WEEKS POST OP.  Patient was given instructions and counseling regarding his condition or for health maintenance advice. Please see specific information pulled into the AVS if appropriate.       EMR Dragon/Transcription disclaimer:  Part of this note may be an electronic transcription/translation of spoken language to printed text using the Dragon Dictation System

## 2024-12-05 NOTE — TELEPHONE ENCOUNTER
PATIENT WAS CALLED AND ADVISED PER DR. COPELAND PATIENT CAN HOLD/STOP ASPIRIN FOR 5 DAYS PRIOR TO SURGERY.  PATIENT VOICED UNDERSTANDING.

## 2024-12-05 NOTE — H&P (VIEW-ONLY)
"Chief Complaint  Follow-up of the Left Knee and Follow-up of the Right Knee    Subjective          History of Present Illness      Robert Vladez is a 49 y.o. male  presents to [facility] for     Patient presents for follow-up evaluation of bilateral knee pain and to review bilateral knee MRIs.  He states the right knee is more painful than the left.  He saw Dr. Skinner for evaluation on 10/24/2024 after seeing his primary care physician.  He had injury to his left knee over a year and a half ago he states about 3 months ago his right knee was injured he admits to popping and catching sensation and pain he states the pain is intolerable and is causing him trouble with activities of daily living he would like to have treatment options discussed today with MRI results.      No Known Allergies     Social History     Socioeconomic History    Marital status:    Tobacco Use    Smoking status: Never    Smokeless tobacco: Never   Vaping Use    Vaping status: Never Used   Substance and Sexual Activity    Alcohol use: Not Currently    Drug use: Never    Sexual activity: Defer        REVIEW OF SYSTEMS    Constitutional: Awake alert and oriented x3, no acute distress, denies fevers, chills, weight loss  Respiratory: No respiratory distress  Vascular: Brisk cap refill, Intact distal pulses, No cyanosis, compartments soft with no signs or symptoms of compartment syndrome or DVT.   Cardiovascular: Denies chest pain, shortness of breath  Skin: Denies rashes, acute skin changes  Neurologic: Denies headache, loss of consciousness  MSK: Bilateral knee pain      Objective   Vital Signs:   BP (!) 161/106 Comment: PROVIDER AWARE. PCP MONITORS BP  Pulse 82   Ht 177.8 cm (70\")   Wt 129 kg (285 lb)   SpO2 95%   BMI 40.89 kg/m²     Body mass index is 40.89 kg/m².    Physical Exam       Bilateral lower extremity: tender to the medial joint line, non tender to the lateral joint line, full " extension, flexion to 130 degrees, mild crepitus with range of motion,  small effusion, no swelling, stable to varus/valgus stress, stable to anterior/posterior drawer , negative  Lachman's, pain with Morena's, calf soft, distal neurovascularly intact, positive EHL, FHL, GS, and TA. Sensation intact to all 5 nerves of the foot.           Procedures    Imaging Results (Most Recent)       None         MRI Knee Right Without Contrast    Result Date: 11/29/2024  Narrative: MRI KNEE RIGHT  WO CONTRAST Date of Exam: 11/26/2024 3:00 PM EST Indication: pain.  Comparison: Right knee radiographs 10/15/2024 Technique:  Routine multiplanar/multisequence images of the RIGHT knee were obtained without contrast administration. Findings: There is subtle increased signal within the posterior horn of the medial meniscus compatible with longitudinal oblique tear (series 10 image 17, series 11 image 8). The articular cartilage of the medial compartment appears grossly preserved without high-grade chondral loss. The medial supporting structures are normal. The lateral meniscus is normal. The lateral supporting structures are unremarkable. The articular cartilage of the lateral compartment appears grossly preserved. The anterior cruciate ligament and posterior cruciate ligament are normal. Small cruciate ganglion cysts are seen in the proximal ACL fibers. The quadriceps tendon and patellar tendon are normal. There is diffuse chondral thinning and chondromalacia of the patellar articular cartilage with areas of moderate-grade chondral loss and chondral fissuring along the median patellar ridge and lateral patellar facet. There is chondromalacia and low to moderate-grade chondral loss and fissuring of the central trochlea (series 12 image 17). There is a small knee joint effusion. There is a small, thin Baker cyst. The posterior knee neurovasculature is unremarkable. Normal appearance of the muscles and subcutaneous tissues. No focal  bony lesion. No fracture or bony contusion.     Impression: Impression: Right knee MRI. Subtle longitudinal oblique tear of the posterior horn of the medial meniscus. Chondromalacia and areas of mild to moderate-grade chondral loss in the patellofemoral compartment. Small knee joint effusion. Small Baker cyst. Electronically Signed: Oswaldo Sam MD  11/29/2024 9:09 AM EST  Workstation ID: [id number]    MRI Knee Left Without Contrast    Result Date: 11/29/2024  Narrative: MRI KNEE LEFT  WO CONTRAST Date of Exam: 11/26/2024 3:00 PM EST Indication: pain.  Comparison: Left knee x-rays 10/15/2024 Technique:  Routine multiplanar/multisequence images of the LEFT knee were obtained without contrast administration. Findings: There is longitudinal horizontal tearing of the body and posterior horn of the medial meniscus (series 11 image 6). There is a questionable small displaced meniscal flap tear of the medial meniscal body extending into the femoral gutter (series 10 image 13, series 8 image 17). There is mild diffuse chondral thinning throughout the central weightbearing aspect of the medial compartment with areas of low-grade chondral fissuring of the medial femoral condyle and medial tibial plateau. The medial supporting structures are unremarkable. Normal appearance of the lateral meniscus. The articular cartilage of the lateral compartment appears grossly preserved. The lateral supporting structures are unremarkable. The anterior cruciate ligament and posterior cruciate ligament are normal. The quadriceps and patellar tendons appear grossly unremarkable. There is some ossification within the distal patellar tendon at the tibial tuberosity compatible with sequelae of old Osgood-Schlatter's disease. There is some chondromalacia and low-grade chondral fissuring of the patellar articular cartilage at the median patellar ridge and lateral patellar facet. There is low to moderate-grade chondral loss and chondral  irregularity/fissuring of the central trochlear cartilage. There is a small knee joint effusion. There is a small, multilobulated Baker cyst. The posterior knee neurovasculature appears unremarkable. Normal appearance of the muscles and subcutaneous tissues. No focal bony lesion. No fracture or bony contusion.     Impression: Impression: Left knee MRI. Longitudinal horizontal tear of the body and posterior horn of the medial meniscus with likely small displaced meniscal flap tear of the meniscal body. Chondromalacia and areas of low to moderate-grade chondral irregularity and fissuring in the patellofemoral compartment. Small knee joint effusion. Small Baker cyst. Electronically Signed: Oswaldo Sam MD  11/29/2024 9:02 AM EST  Workstation ID: [id number]       Result Review :   The following data was reviewed by: HOMAR Tee on 12/05/2024:  Data reviewed : Radiologic studies reviewed by myself and Dr. Skinner with the patient              Assessment and Plan    Diagnoses and all orders for this visit:    1. Tear of medial meniscus of knee, unspecified laterality, unspecified tear type, unspecified whether old or current tear, initial encounter (Primary)    2. Right knee pain, unspecified chronicity    3. Left knee pain, unspecified chronicity    4. Arthritis of knee, right    5. Arthritis of knee, left        Reviewed MRIs with the patient discussed diagnosis and treatment options with him Dr. Skinner also met with him discussed risk benefits procedure recovery of right and left knee arthroscopic partial medial meniscectomy chondroplasty and debridements.  Patient elected to have both knees done at the same time per his request this was discussed with him and he agreed to have this scheduled due to his work schedule he has downtime now through February and he would like to have both knees done at the same time Dr. Skinner agreed to do this follow-up 2 weeks postop    Discussed surgery.,  Risks/benefits discussed with patient including, but not limited to: infection, bleeding, neurovascular damage, re-rupture, aesthetic deformity, need for further surgery, and death., Risks/benefits discussed with patient including, but not limited to: infection, bleeding, neurovascular damage, malunion, nonunion, aesthetic deformity, need for further surgery, and death., Discussed with patient the implant type being used during surgery and patient understands., Surgery pamphlet given., Call or return if worsening symptoms., DME order for a 3 in 1 given today due to patient will be confined to one room/level of the home that does not offer a toilet during postop recovery. , I am prescribing  the Omero® Pro2.0 sustained acoustic medicine device for the acceleration of soft tissue repair and reduction of pain. , I am ordering the use of the Nice1 cold therapy machine for 60 days post-op as part of an opioid-sparing approach to help manage pain and edema.  I feel this is medically necessary for the best care for this patient.   , IFC can help extend pain relief and hopefully reduce need for pain medication. TENS is used to control break through pain. NMES is used to help and strengthen weak muscles and prevent atrophy., Provider is aware of patient's metal allergy. , and Patient does not have any metal allergies.     Follow Up   Return for 2 WEEKS POST OP.  Patient was given instructions and counseling regarding his condition or for health maintenance advice. Please see specific information pulled into the AVS if appropriate.       EMR Dragon/Transcription disclaimer:  Part of this note may be an electronic transcription/translation of spoken language to printed text using the Dragon Dictation System

## 2024-12-26 NOTE — PRE-PROCEDURE INSTRUCTIONS
ATIENT INSTRUCTED TO BE:    - NOTHING TO EAT AFTER MIDNIGHT OR CHEW, EXCEPT CAN HAVE CLEAR LIQUIDS 2 HOURS PRIOR TO SURGERY ARRIVAL TIME , NO MORE THAN 8 OZ. (NOTHING RED)     - TO HOLD ALL VITAMINS, SUPPLEMENTS, NSAIDS FOR ONE WEEK PRIOR TO THEIR SURGICAL PROCEDURE    - DO NOT TAKE _________LAST DOSE OF ORAL SEMAGLUTIDE 12/24/24_____________ 7 DAYS PRIOR TO PROCEDURE PER ANESTHESIA RECOMMENDATIONS/INSTRUCTIONS     - INSTRUCTED PT TO USE SURGICAL SOAP 1 TIME THE NIGHT PRIOR TO SURGERY ___________ OR THE AM OF SURGERY _____________   USE THE SOAP FROM NECK TO TOES, AVOID THEIR FACE, HAIR, AND PRIVATE PARTS. IF USE THE SOAP THE NIGHT PRIOR TO SURGERY, CHANGE BED LINENS AND NO PETS IN THE BED.     INSTRUCTED NO LOTIONS, JEWELRY, PIERCINGS,  NAIL POLISH, OR DEODORANT DAY OF SURGERY    - IF DIABETIC, CHECK BLOOD GLUCOSE IF LESS THAN 70 OR HAVING SYMPTOMS CALL THE PREOP AREA FOR INSTRUCTIONS ON AM OF SURGERY ([phone])    -INSTRUCTED TO TAKE THE FOLLOWING MEDICATIONS THE DAY OF SURGERY WITH SIPS OF WATER:   AMLODIPINE, PANTOPRAZOLE        - DO NOT BRING ANY MEDICATIONS WITH YOU TO THE HOSPITAL THE DAY OF SURGERY, EXCEPT IF USE INHALERS. BRING INHALERS DAY OF SURGERY       - BRING CPAP OR BIPAP TO THE HOSPITAL ONLY IF YOU ARE SPENDING THE NIGHT    - DO NOT SMOKE OR VAPE 24 HOURS PRIOR TO PROCEDURE PER ANESTHESIA REQUEST     -MAKE SURE YOU HAVE A RIDE HOME OR SOMEONE TO STAY WITH YOU THE DAY OF THE PROCEDURE AFTER YOU GO HOME     - FOLLOW ANY OTHER INSTRUCTIONS GIVEN TO YOU BY YOUR SURGEON'S OFFICE.     - DAY OF SURGERY _COME TO ENTRANCE / [facility], FIRST FLOOR. CHECK IN AT THE DESK FOR REGISTRATION/SURGERY    - YOU WILL RECEIVE A PHONE CALL THE DAY PRIOR TO SURGERY BETWEEN 1PM AND 4 PM WITH ARRIVAL TIME, IF YOUR SURGERY IS ON A MONDAY YOU WILL RECEIVE A CALL THE FRIDAY PRIOR TO SURGERY DATE    - BRING CASH OR CREDIT CARD FOR COPAYMENT OF MEDICATIONS AFTER SURGERY IF YOU USE THE HOSPITAL PHARMACY (MEDS TO BED)    -  PREADMISSION TESTING NURSE JOSELUIS LEIGH [phone] IF HAVE ANY QUESTIONS     -PATIENT PROVIDED THE NUMBER FOR PREOP SURGICAL DEPT IF HAD QUESTIONS AFTER HOURS PRIOR TO SURGERY ( [phone]).  INFORMED PT IF NO ANSWER, LEAVE A MESSAGE AND SOMEONE WILL RETURN THEIR CALL       PATIENT VERBALIZED UNDERSTANDING

## 2024-12-29 ENCOUNTER — ANESTHESIA EVENT (OUTPATIENT)
Dept: PERIOP | Facility: HOSPITAL | Age: 49
End: 2024-12-29
Payer: MEDICAID

## 2024-12-30 ENCOUNTER — HOSPITAL ENCOUNTER (OUTPATIENT)
Facility: HOSPITAL | Age: 49
Setting detail: HOSPITAL OUTPATIENT SURGERY
Discharge: HOME | End: 2024-12-30
Attending: ORTHOPAEDIC SURGERY | Admitting: ORTHOPAEDIC SURGERY
Payer: MEDICAID

## 2024-12-30 ENCOUNTER — ANESTHESIA EVENT CONVERTED (OUTPATIENT)
Dept: ANESTHESIOLOGY | Facility: HOSPITAL | Age: 49
End: 2024-12-30
Payer: MEDICAID

## 2024-12-30 ENCOUNTER — ANESTHESIA (OUTPATIENT)
Dept: PERIOP | Facility: HOSPITAL | Age: 49
End: 2024-12-30
Payer: MEDICAID

## 2024-12-30 VITALS
WEIGHT: 305.34 LBS | DIASTOLIC BLOOD PRESSURE: 89 MMHG | HEIGHT: 70 IN | OXYGEN SATURATION: 94 % | BODY MASS INDEX: 43.71 KG/M2 | TEMPERATURE: 98.4 F | HEART RATE: 79 BPM | SYSTOLIC BLOOD PRESSURE: 124 MMHG | RESPIRATION RATE: 19 BRPM

## 2024-12-30 DIAGNOSIS — S83.249A TEAR OF MEDIAL MENISCUS OF KNEE, CURRENT, UNSPECIFIED LATERALITY, UNSPECIFIED TEAR TYPE, INITIAL ENCOUNTER: Primary | ICD-10-CM

## 2024-12-30 DIAGNOSIS — S83.249A TEAR OF MEDIAL MENISCUS OF KNEE, UNSPECIFIED LATERALITY, UNSPECIFIED TEAR TYPE, UNSPECIFIED WHETHER OLD OR CURRENT TEAR, INITIAL ENCOUNTER: ICD-10-CM

## 2024-12-30 DIAGNOSIS — M25.561 RIGHT KNEE PAIN, UNSPECIFIED CHRONICITY: ICD-10-CM

## 2024-12-30 LAB
ANION GAP SERPL CALCULATED.3IONS-SCNC: 9.5 MMOL/L (ref 5–15)
BUN SERPL-MCNC: 18 MG/DL (ref 6–20)
BUN/CREAT SERPL: 16.7 (ref 7–25)
CALCIUM SPEC-SCNC: 8.8 MG/DL (ref 8.6–10.5)
CHLORIDE SERPL-SCNC: 98 MMOL/L (ref 98–107)
CO2 SERPL-SCNC: 30.5 MMOL/L (ref 22–29)
CREAT SERPL-MCNC: 1.08 MG/DL (ref 0.76–1.27)
EGFRCR SERPLBLD CKD-EPI 2021: 84.1 ML/MIN/1.73
GLUCOSE BLDC GLUCOMTR-MCNC: 159 MG/DL (ref 70–99)
GLUCOSE SERPL-MCNC: 155 MG/DL (ref 65–99)
POTASSIUM SERPL-SCNC: 3.4 MMOL/L (ref 3.5–5.2)
SODIUM SERPL-SCNC: 138 MMOL/L (ref 136–145)

## 2024-12-30 PROCEDURE — 25010000002 SUGAMMADEX 200 MG/2ML SOLUTION: Performed by: NURSE ANESTHETIST, CERTIFIED REGISTERED

## 2024-12-30 PROCEDURE — 25010000002 MORPHINE SULFATE (PF) 10 MG/ML SOLUTION: Performed by: ORTHOPAEDIC SURGERY

## 2024-12-30 PROCEDURE — 82948 REAGENT STRIP/BLOOD GLUCOSE: CPT | Performed by: NURSE ANESTHETIST, CERTIFIED REGISTERED

## 2024-12-30 PROCEDURE — 25010000002 VASOPRESSIN 20 UNIT/ML SOLUTION: Performed by: NURSE ANESTHETIST, CERTIFIED REGISTERED

## 2024-12-30 PROCEDURE — 25010000002 CEFAZOLIN 3 G RECONSTITUTED SOLUTION 1 EACH VIAL: Performed by: PHYSICIAN ASSISTANT

## 2024-12-30 PROCEDURE — 25010000002 MIDAZOLAM PER 1MG: Performed by: ANESTHESIOLOGY

## 2024-12-30 PROCEDURE — 25010000002 DEXAMETHASONE PER 1 MG: Performed by: NURSE ANESTHETIST, CERTIFIED REGISTERED

## 2024-12-30 PROCEDURE — 80048 BASIC METABOLIC PNL TOTAL CA: CPT | Performed by: ORTHOPAEDIC SURGERY

## 2024-12-30 PROCEDURE — 25010000002 PROPOFOL 10 MG/ML EMULSION: Performed by: NURSE ANESTHETIST, CERTIFIED REGISTERED

## 2024-12-30 PROCEDURE — 25010000002 BUPIVACAINE (PF) 0.25 % SOLUTION: Performed by: ORTHOPAEDIC SURGERY

## 2024-12-30 PROCEDURE — 25810000003 LACTATED RINGERS PER 1000 ML: Performed by: ANESTHESIOLOGY

## 2024-12-30 PROCEDURE — 25010000002 KETOROLAC TROMETHAMINE PER 15 MG: Performed by: NURSE ANESTHETIST, CERTIFIED REGISTERED

## 2024-12-30 PROCEDURE — 25010000002 FENTANYL CITRATE (PF) 50 MCG/ML SOLUTION: Performed by: NURSE ANESTHETIST, CERTIFIED REGISTERED

## 2024-12-30 PROCEDURE — 25010000002 ONDANSETRON PER 1 MG: Performed by: NURSE ANESTHETIST, CERTIFIED REGISTERED

## 2024-12-30 PROCEDURE — 25010000002 LIDOCAINE PF 2% 2 % SOLUTION: Performed by: NURSE ANESTHETIST, CERTIFIED REGISTERED

## 2024-12-30 RX ORDER — DEXAMETHASONE SODIUM PHOSPHATE 4 MG/ML
INJECTION, SOLUTION INTRA-ARTICULAR; INTRALESIONAL; INTRAMUSCULAR; INTRAVENOUS; SOFT TISSUE AS NEEDED
Status: DISCONTINUED | OUTPATIENT
Start: 2024-12-30 | End: 2024-12-30 | Stop reason: SURG

## 2024-12-30 RX ORDER — MIDAZOLAM HYDROCHLORIDE 2 MG/2ML
2 INJECTION, SOLUTION INTRAMUSCULAR; INTRAVENOUS ONCE
Status: COMPLETED | OUTPATIENT
Start: 2024-12-30 | End: 2024-12-30

## 2024-12-30 RX ORDER — PHENYLEPHRINE HCL IN 0.9% NACL 1 MG/10 ML
SYRINGE (ML) INTRAVENOUS AS NEEDED
Status: DISCONTINUED | OUTPATIENT
Start: 2024-12-30 | End: 2024-12-30 | Stop reason: SURG

## 2024-12-30 RX ORDER — CELECOXIB 100 MG/1
200 CAPSULE ORAL ONCE
Status: COMPLETED | OUTPATIENT
Start: 2024-12-30 | End: 2024-12-30

## 2024-12-30 RX ORDER — PROMETHAZINE HYDROCHLORIDE 12.5 MG/1
25 TABLET ORAL ONCE AS NEEDED
Status: DISCONTINUED | OUTPATIENT
Start: 2024-12-30 | End: 2024-12-30 | Stop reason: HOSPADM

## 2024-12-30 RX ORDER — SODIUM CHLORIDE, SODIUM LACTATE, POTASSIUM CHLORIDE, CALCIUM CHLORIDE 600; 310; 30; 20 MG/100ML; MG/100ML; MG/100ML; MG/100ML
9 INJECTION, SOLUTION INTRAVENOUS CONTINUOUS PRN
Status: DISCONTINUED | OUTPATIENT
Start: 2024-12-30 | End: 2024-12-30 | Stop reason: HOSPADM

## 2024-12-30 RX ORDER — ONDANSETRON 2 MG/ML
4 INJECTION INTRAMUSCULAR; INTRAVENOUS ONCE AS NEEDED
Status: DISCONTINUED | OUTPATIENT
Start: 2024-12-30 | End: 2024-12-30 | Stop reason: HOSPADM

## 2024-12-30 RX ORDER — SODIUM CHLORIDE 9 MG/ML
40 INJECTION, SOLUTION INTRAVENOUS AS NEEDED
Status: DISCONTINUED | OUTPATIENT
Start: 2024-12-30 | End: 2024-12-30 | Stop reason: HOSPADM

## 2024-12-30 RX ORDER — FENTANYL CITRATE 50 UG/ML
INJECTION, SOLUTION INTRAMUSCULAR; INTRAVENOUS AS NEEDED
Status: DISCONTINUED | OUTPATIENT
Start: 2024-12-30 | End: 2024-12-30 | Stop reason: SURG

## 2024-12-30 RX ORDER — KETOROLAC TROMETHAMINE 15 MG/ML
INJECTION, SOLUTION INTRAMUSCULAR; INTRAVENOUS AS NEEDED
Status: DISCONTINUED | OUTPATIENT
Start: 2024-12-30 | End: 2024-12-30 | Stop reason: SURG

## 2024-12-30 RX ORDER — MEPERIDINE HYDROCHLORIDE 25 MG/ML
12.5 INJECTION INTRAMUSCULAR; INTRAVENOUS; SUBCUTANEOUS
Status: DISCONTINUED | OUTPATIENT
Start: 2024-12-30 | End: 2024-12-30 | Stop reason: HOSPADM

## 2024-12-30 RX ORDER — SODIUM CHLORIDE 0.9 % (FLUSH) 0.9 %
10 SYRINGE (ML) INJECTION AS NEEDED
Status: DISCONTINUED | OUTPATIENT
Start: 2024-12-30 | End: 2024-12-30 | Stop reason: HOSPADM

## 2024-12-30 RX ORDER — VASOPRESSIN 20 [USP'U]/ML
INJECTION, SOLUTION INTRAVENOUS AS NEEDED
Status: DISCONTINUED | OUTPATIENT
Start: 2024-12-30 | End: 2024-12-30 | Stop reason: SURG

## 2024-12-30 RX ORDER — HYDROCODONE BITARTRATE AND ACETAMINOPHEN 7.5; 325 MG/1; MG/1
1 TABLET ORAL EVERY 4 HOURS PRN
Qty: 36 TABLET | Refills: 0 | Status: SHIPPED | OUTPATIENT
Start: 2024-12-30

## 2024-12-30 RX ORDER — MORPHINE SULFATE 10 MG/ML
INJECTION, SOLUTION INTRAMUSCULAR; INTRAVENOUS AS NEEDED
Status: DISCONTINUED | OUTPATIENT
Start: 2024-12-30 | End: 2024-12-30 | Stop reason: HOSPADM

## 2024-12-30 RX ORDER — OXYCODONE HYDROCHLORIDE 5 MG/1
5 TABLET ORAL
Status: DISCONTINUED | OUTPATIENT
Start: 2024-12-30 | End: 2024-12-30 | Stop reason: HOSPADM

## 2024-12-30 RX ORDER — ASPIRIN 325 MG
325 TABLET, DELAYED RELEASE (ENTERIC COATED) ORAL DAILY
Qty: 14 TABLET | Refills: 0 | Status: SHIPPED | OUTPATIENT
Start: 2024-12-30

## 2024-12-30 RX ORDER — CALCIUM CHLORIDE 100 MG/ML
INJECTION INTRAVENOUS; INTRAVENTRICULAR AS NEEDED
Status: DISCONTINUED | OUTPATIENT
Start: 2024-12-30 | End: 2024-12-30 | Stop reason: SURG

## 2024-12-30 RX ORDER — ACETAMINOPHEN 500 MG
1000 TABLET ORAL ONCE
Status: COMPLETED | OUTPATIENT
Start: 2024-12-30 | End: 2024-12-30

## 2024-12-30 RX ORDER — LIDOCAINE HYDROCHLORIDE 20 MG/ML
INJECTION, SOLUTION EPIDURAL; INFILTRATION; INTRACAUDAL; PERINEURAL AS NEEDED
Status: DISCONTINUED | OUTPATIENT
Start: 2024-12-30 | End: 2024-12-30 | Stop reason: SURG

## 2024-12-30 RX ORDER — ONDANSETRON 2 MG/ML
INJECTION INTRAMUSCULAR; INTRAVENOUS AS NEEDED
Status: DISCONTINUED | OUTPATIENT
Start: 2024-12-30 | End: 2024-12-30 | Stop reason: SURG

## 2024-12-30 RX ORDER — PROPOFOL 10 MG/ML
VIAL (ML) INTRAVENOUS AS NEEDED
Status: DISCONTINUED | OUTPATIENT
Start: 2024-12-30 | End: 2024-12-30 | Stop reason: SURG

## 2024-12-30 RX ORDER — PROMETHAZINE HYDROCHLORIDE 25 MG/1
25 SUPPOSITORY RECTAL ONCE AS NEEDED
Status: DISCONTINUED | OUTPATIENT
Start: 2024-12-30 | End: 2024-12-30 | Stop reason: HOSPADM

## 2024-12-30 RX ORDER — BUPIVACAINE HYDROCHLORIDE 2.5 MG/ML
INJECTION, SOLUTION EPIDURAL; INFILTRATION; INTRACAUDAL AS NEEDED
Status: DISCONTINUED | OUTPATIENT
Start: 2024-12-30 | End: 2024-12-30 | Stop reason: HOSPADM

## 2024-12-30 RX ORDER — ROCURONIUM BROMIDE 10 MG/ML
INJECTION, SOLUTION INTRAVENOUS AS NEEDED
Status: DISCONTINUED | OUTPATIENT
Start: 2024-12-30 | End: 2024-12-30 | Stop reason: SURG

## 2024-12-30 RX ADMIN — Medication 100 MCG: at 07:55

## 2024-12-30 RX ADMIN — CELECOXIB 200 MG: 100 CAPSULE ORAL at 07:16

## 2024-12-30 RX ADMIN — SODIUM CHLORIDE, POTASSIUM CHLORIDE, SODIUM LACTATE AND CALCIUM CHLORIDE 9 ML/HR: 600; 310; 30; 20 INJECTION, SOLUTION INTRAVENOUS at 07:17

## 2024-12-30 RX ADMIN — FENTANYL CITRATE 100 MCG: 50 INJECTION, SOLUTION INTRAMUSCULAR; INTRAVENOUS at 07:45

## 2024-12-30 RX ADMIN — VASOPRESSIN 6 UNITS: 20 INJECTION INTRAVENOUS at 08:19

## 2024-12-30 RX ADMIN — DEXAMETHASONE SODIUM PHOSPHATE 4 MG: 4 INJECTION, SOLUTION INTRAMUSCULAR; INTRAVENOUS at 07:58

## 2024-12-30 RX ADMIN — Medication 100 MCG: at 08:04

## 2024-12-30 RX ADMIN — CALCIUM CHLORIDE INJECTION 100 MG: 100 INJECTION, SOLUTION INTRAVENOUS at 08:31

## 2024-12-30 RX ADMIN — SODIUM CHLORIDE 3 G: 9 INJECTION, SOLUTION INTRAVENOUS at 07:43

## 2024-12-30 RX ADMIN — KETOROLAC TROMETHAMINE 30 MG: 15 INJECTION, SOLUTION INTRAMUSCULAR; INTRAVENOUS at 08:54

## 2024-12-30 RX ADMIN — Medication 100 MCG: at 08:10

## 2024-12-30 RX ADMIN — VASOPRESSIN 4 UNITS: 20 INJECTION INTRAVENOUS at 08:25

## 2024-12-30 RX ADMIN — ACETAMINOPHEN 1000 MG: 500 TABLET ORAL at 07:16

## 2024-12-30 RX ADMIN — LIDOCAINE HYDROCHLORIDE 40 MG: 20 INJECTION, SOLUTION INTRAVENOUS at 07:45

## 2024-12-30 RX ADMIN — PROPOFOL 200 MG: 10 INJECTION, EMULSION INTRAVENOUS at 07:45

## 2024-12-30 RX ADMIN — ROCURONIUM BROMIDE 50 MG: 50 INJECTION INTRAVENOUS at 07:45

## 2024-12-30 RX ADMIN — MIDAZOLAM HYDROCHLORIDE 2 MG: 1 INJECTION, SOLUTION INTRAMUSCULAR; INTRAVENOUS at 07:34

## 2024-12-30 RX ADMIN — SUGAMMADEX 160 MG: 100 INJECTION, SOLUTION INTRAVENOUS at 08:51

## 2024-12-30 RX ADMIN — ONDANSETRON 4 MG: 2 INJECTION INTRAMUSCULAR; INTRAVENOUS at 07:58

## 2024-12-30 NOTE — ANESTHESIA PROCEDURE NOTES
Peripheral Block      Patient reassessed immediately prior to procedure    Patient location during procedure: pre-op  Reason for block: at surgeon's request and post-op pain management  Preanesthetic Checklist  Completed: patient identified, IV checked, site marked, risks and benefits discussed, surgical consent, monitors and equipment checked, pre-op evaluation and timeout performed  Prep:  Pt Position: supine  Sterile barriers:alcohol skin prep, partial drape, cap, washed/disinfected hands, mask and gloves  Prep: ChloraPrep  Patient monitoring: blood pressure monitoring, continuous pulse oximetry and EKG  Procedure    Sedation: yes  Performed under: local infiltration  Guidance:ultrasound guided and nerve stimulator    ULTRASOUND INTERPRETATION.  Using ultrasound guidance a 20 G gauge needle was placed in close proximity to the nerve, at which point, under ultrasound guidance anesthetic was injected in the area of the nerve and spread of the anesthesia was seen on ultrasound in close proximity thereto.  There were no abnormalities seen on ultrasound; a digital image was taken; and the patient tolerated the procedure with no complications. Images:still images obtained, printed/placed on chart    Block Type:adductor canal block  Injection Technique:single-shot  Needle Type:echogenic  Needle Gauge:20 G (4in)  Resistance on Injection: none          Post Assessment  Injection Assessment: negative aspiration for heme, no paresthesia on injection and incremental injection  Patient Tolerance:comfortable throughout block  Complications:no  Additional Notes  The block or continuous infusion is requested by the referring physician for management of postoperative pain, or pain related to a procedure. Ultrasound guidance (deemed medically necessary). Painless injection, pt was awake and conversant during the procedure without complications. Needle and surrounding structures visualized throughout procedure. No adverse reactions  or complications seen during this period. Post-procedure image showed no signs of complication, and anatomy was consistent with an uncomplicated nerve blockade.  Performed by: Parvez Bliss MD

## 2024-12-30 NOTE — ANESTHESIA PREPROCEDURE EVALUATION
Anesthesia Evaluation     Patient summary reviewed and Nursing notes reviewed   no history of anesthetic complications:   NPO Solid Status: > 8 hours  NPO Liquid Status: > 2 hours           Airway   Mallampati: III  TM distance: >3 FB  Neck ROM: full  No difficulty expected and Large neck circumference  Dental - normal exam     Pulmonary - normal exam    breath sounds clear to auscultation  (+) ,sleep apnea (no formal dx, but likely based on clinical picture)  Cardiovascular - normal exam  Exercise tolerance: good (4-7 METS)    ECG reviewed  Rhythm: regular  Rate: normal    (+) hypertension, hyperlipidemia      Neuro/Psych- negative ROS    ROS Comment: HX of cerebral aneurysm that was coiled in 1/2024. No issues since  GI/Hepatic/Renal/Endo    (+) morbid obesity, GERD, PUD, liver disease fatty liver disease, renal disease- stones, diabetes mellitus    Musculoskeletal     Abdominal    Substance History - negative use     OB/GYN negative ob/gyn ROS         Other   arthritis,     ROS/Med Hx Other: PAT Nursing Notes unavailable.     HX of cerebral aneurysm that was coiled in 1/2024. No issues since.  Neurology gave clearance to hold ASA preop    Pt states he has been off Ozempic for months                Anesthesia Plan    ASA 3     general     (Patient understands anesthesia not responsible for dental damage.)  intravenous induction     Anesthetic plan, risks, benefits, and alternatives have been provided, discussed and informed consent has been obtained with: patient.    Use of blood products discussed with patient .    Plan discussed with CRNA.      CODE STATUS:

## 2024-12-30 NOTE — ANESTHESIA POSTPROCEDURE EVALUATION
Patient: Robert Valdez    Procedure Summary       Date: 12/30/24 Room / Location: [facility] OR [address] [place] [address] OR [facility]    Anesthesia Start: 0741 Anesthesia Stop: 0903    Procedure: BILATERAL  KNEE ARTHROSCOPY WITH PARTIAL MEDIAL MENISCECTOMY, chondroplasty and debridement (Bilateral: Knee) Diagnosis:       Right knee pain, unspecified chronicity      Tear of medial meniscus of knee, unspecified laterality, unspecified tear type, unspecified whether old or current tear, initial encounter      (Right knee pain, unspecified chronicity [M25.561])      (Tear of medial meniscus of knee, unspecified laterality, unspecified tear type, unspecified whether old or current tear, initial encounter [S83.249A])    Surgeons: Tal Skinner MD Provider: Parvez Bliss MD    Anesthesia Type: general, regional ASA Status: 3            Anesthesia Type: general, regional    Vitals  Vitals Value Taken Time   /90 12/30/24 0942   Temp     Pulse 80 12/30/24 0945   Resp 17 12/30/24 0905   SpO2 94 % 12/30/24 0945           Post Anesthesia Care and Evaluation    Patient location during evaluation: bedside  Patient participation: complete - patient participated  Level of consciousness: awake  Pain management: adequate    Airway patency: patent  PONV Status: none  Cardiovascular status: acceptable and stable  Respiratory status: acceptable  Hydration status: acceptable

## 2024-12-30 NOTE — DISCHARGE INSTRUCTIONS
DISCHARGE INSTRUCTIONS  POST-OPERATIVE  Knee Arthroscopic Surgery      For your surgery you had:  General anesthesia (you may have a sore throat for the first 24 hours)   IV sedation.  Local anesthesia  Monitored anesthesia care  You may experience dizziness, drowsiness, or light-headedness for several hours following surgery/procedure.  Do not stay alone today or tonight.  Limit your activity for 24 hours.  Resume your diet slowly.  Follow whatever special dietary instructions you may have been given by your doctor.  You should not drive or operate machinery or drink alcohol for 24 hours or while you are taking pain medication.  You should not sign legally binding documents.    Post-Operative Day #1  Post-Operative Day #1 is counted as the 1st day after surgery.  Leave ace wrap on day one to aid in the reduction of swelling.  If dressing is too tight it can be rewrapped.  Post-Operative Day #2  Wednesday   Ace wrap and dressing may be removed.  Put a 4x4 dressing to suture or staple site.  Change dressing once or twice daily until suture or staples are removed.  It is normal to have some redness or irritation around skin sutures or stapes, however, if you have any expanding areas of redness with a persistent fever and increasing pain notify the physician as soon as possible.  On Post-Operative Day #2, you may want to reapply the ace wrap.  Put ace wrap directly over the knee to add compression and aid in swelling reduction.  It is normal to have some swelling down into the calf and ankle after knee arthroscopy.  If you notice a significant amount of swelling or increasing pain in calf area, notify the physician immediately.   Post-Operative Day #2 Wednesday   You may shower.  During shower, avoid too much water to incision site.  Let water run down leg and then pat dry.  Do not put any ointments on the incision unless directed by surgeon.  Reapply dry dressing to sutures or staple sites.    General  Information  Full weight bearing with crutches  or walker  is allowed after a standard knee arthroscopy or ACL reconstruction unless instructed otherwise by surgeon.  You may put as much weight on knee as tolerable.   Knee range of motion exercises should be started as early as the day of surgery.  Try to achieve full extension and start working on range of motion and flexion of the knee.  Move the ankle up and down periodically to help reduce swelling as well as reduce blood pooling.  To allow full extension of the knee and prevent blood clots it is very important to put pillows at the level of the mid-calf to the foot.  DO NOT put pillows directly behind knee.  SPECIAL INSTRUCTIONS:     Tylenol 715am   May start pain meds after 1115am today                                                         DISCHARGE INSTRUCTIONS  POST-OPERATIVE   Knee Arthroscopic Surgery      General Instructions  You will receive a prescription for physical therapy, unless otherwise instructed by physician.  Physical therapy is imperative especially after ACL reconstruction and some knee arthroscopies for swelling reduction, knee range of motion and strengthening.  [x] Use ice pack on the knee for 20 minutes on and 20 minutes off.  Never place ice directly on the skin.  By following this, you will cool the knee sufficiently.  Avoid getting dressing wet.  To help reduce swelling and minimize throbbing pain, use pillows to keep the knee elevated above the level of the heart, even while sleeping.  Sit with the leg propped up on a footstool or chair with pillows.  Exercises toes for 10 minutes every hour while awake.  If you are given a prescription for pain medication, take it as prescribed.  If you are able to take over-the-counter anti-inflammatory medications such as Motrin or Aleve, you may take as per package instructions in between the pain medication to help enhance pain control and reduce swelling.  If you are taking the pain  medication prescribed, do not take Tylenol too unless you consult with the pharmacist. Generally, the pain medications prescribed have Tylenol in them and too much Tylenol can be harmful.  You should stop the anti-inflammatory medication if you experience any stomach/GI disturbances.  Consult with your physician or your pharmacist before taking over-the-counter pain medications/anti-inflammatories. It is not uncommon to have a fever post-operatively especially in the first 24-48 hours.  Deep breathing and coughing and early ambulation will help.  Anti-inflammatories can be used for fever reduction also.  If unable to urinate 6 to 8 hours after surgery or urinating frequently in small amounts, notify the physician or go to the nearest Emergency Room.  NOTIFY YOUR PHYSICIAN IF YOU EXPERIENCE:  Numbness of toes.  Inability to move toes.  Extreme coldness, paleness or blue dis-coloration of toes.  Any pain other than from the incision, such as swelling of the leg that blocks circulation of the toes.  Follow verbal instructions from your doctor.  Medications per physician instructions as indicated on Discharge Medication Information Sheet.  You should see  ______________________for follow-up care  on   .  Phone number: _[phone]________________________  Missing your appointment/follow-up could lead to serious complications.  If you have an emergency and cannot reach your doctor, go to an Emergency Room nearest your home.

## 2024-12-30 NOTE — OP NOTE
KNEE ARTHROSCOPY  Procedure Report    Patient Name:  Robert Valdez  YOB: 1975    Date of Surgery:  12/30/2024     Indications: The patient has failed conservative treatment and wishes to proceed with operative treatment.  We discussed the risk of surgery including bleeding, infection, damage to neurovascular structures, anesthesia complications, continued pain and disability, need for additional procedures among others.  Informed consent was obtained and they wished to proceed.    Pre-op Diagnosis:   Right knee pain, unspecified chronicity [M25.561]  Tear of medial meniscus of knee, unspecified laterality, unspecified tear type, unspecified whether old or current tear, initial encounter [S83.249A]       Post-Op Diagnosis Codes:     * Right knee pain, unspecified chronicity [M25.561]     * Tear of medial meniscus of knee, unspecified laterality, unspecified tear type, unspecified whether old or current tear, initial encounter [S83.249A]    Procedure/CPT® Codes:      Procedure(s):  BILATERAL  KNEE ARTHROSCOPY WITH PARTIAL MEDIAL MENISCECTOMY, chondroplasty and debridement    Staff:  Surgeon(s):  Tal Skinner MD         Anesthesia: General    Estimated Blood Loss: 2 mL    Implants:    Nothing was implanted during the procedure    Specimen:          None        Findings: Left knee grade IV chondromalacia, medial meniscus tear, grade II-III chondromalacia right knee, medial meniscus tear    Complications: None    Description of Procedure: The operative sites were marked in the preoperative holding area.  The patient was brought to the operating room and placed supine on the operating room table.  General anesthesia was given.  All bony prominences were padded.  We plan for bilateral knee arthroscopy.  We began with the left leg.  The operative leg had a nonsterile tourniquet placed on the thigh and was placed in arthroscopic leg todd.  The opposite leg was placed in a padded leg todd  and the foot of the bed was lowered.      The patient received preoperative antibiotic.  After formal timeout was held, Esmarch was used to exsanguinate the limb and tourniquet was inflated.  A stab incision was made over the anterolateral aspect of the knee and a trocar was inserted into the knee.  The knee was extended, and diagnostic arthroscopy was performed. Anteromedial portal was made with the spinal needle from outside in.  A stab incision was made.  An arthroscopic shaver was inserted into the knee and the knee was débrided.      Patellofemoral inspection revealed grade IV chondromalacia of the patellofemoral joint.  There is a wide area of grade III-IV chondromalacia along the central trochlea.  There is grade II-III chondromalacia to the patella with lateral patellar tilt.  This was over diffuse patellar area.  This was debrided with a motorized shaver for a cartilage debridement and chondroplasty back to a stable cartilage surface.     The knee was flexed.  The valgus stress was placed to the knee.  A probe was inserted into the medial compartment.  Medial compartment exam revealed a flap tear of the posterior horn of the medial meniscus with a displaced fragment into the superior medial knee.  This was reduced with the probe and debrided with the straight meniscal biter and motorized shaver for a partial medial meniscectomy back to a stable meniscal capsular rim.  Around 4 to 5 mm of meniscus was removed.     At this point attention was turned to the notch in the femur.  The ACL was probed and found to be stable. The PCL was intact.     The lateral compartment findings included intact lateral meniscus with mild fraying.  Intact lateral tibiofemoral cartilage without significant chondromalacia.    At this point then fluid was drained from the knee.  Tourniquet was released.  The incisions were closed with 3-0 nylon in figure-of-8 fashion.  Local anesthetic, 10 mL of quarter percent Marcaine and 10 mg of  morphine, were injected into the knee.  Xeroform, 4x4s, soft roll and an Ace wrap were placed.     Attention was then turned to the contralateral leg.  The left leg was placed in the well-leg todd.  A tourniquet was placed on the right thigh and the right leg was placed in the arthroscopic leg todd. An esmarch was used to exsanguinate the limb and tourniquet was inflated.  A stab incision was made over the anterolateral aspect of the knee and a trocar was inserted into the knee.  The knee was extended, and diagnostic arthroscopy was performed. Anteromedial portal was made with the spinal needle from outside in.  A stab incision was made.  An arthroscopic shaver was inserted into the knee and the knee was débrided.      Patellofemoral inspection revealed grade 2-3 chondromalacia of the patellofemoral joint.  There is a area of grade 2-3 chondromalacia along the central trochlea.  There is grade II-III chondromalacia to the patella.  This was over diffuse patellar area.  This was debrided with a motorized shaver for a cartilage debridement and chondroplasty back to a stable cartilage surface.     The knee was flexed.  The valgus stress was placed to the knee.  A probe was inserted into the medial compartment.  Medial compartment exam revealed a flap tear of the posterior horn of the medial meniscus with a displaced fragment into the inferior medial knee.  This was reduced with the probe and debrided with the straight meniscal biter and motorized shaver for a partial medial meniscectomy back to a stable meniscal capsular rim.  Around 4 to 5 mm of meniscus was removed.     At this point attention was turned to the notch in the femur.  The ACL was probed and found to be stable. The PCL was intact.     The lateral compartment findings included intact lateral meniscus with mild fraying.  Intact lateral tibiofemoral cartilage without significant chondromalacia.    At this point then fluid was drained from the knee.   Tourniquet was released.  The incisions were closed with 3-0 nylon in figure-of-8 fashion.  Local anesthetic, 10 mL of quarter percent Marcaine and 10 mg of morphine, were injected into the knee.  Xeroform, 4x4s, soft roll and an Ace wrap were placed.      The patient awoke from anesthesia in stable condition.  There were no complications.  All counts were correct.  The patient was stable to recovery.        was responsible for performing the following activities: Retraction, Suction, Irrigation, and Placing Dressing and their skilled assistance was necessary for the success of this case.    Tal Skinner MD     Date: 12/30/2024  Time: 09:01 EST

## 2025-01-13 ENCOUNTER — OFFICE VISIT (OUTPATIENT)
Dept: ORTHOPEDIC SURGERY | Facility: CLINIC | Age: 50
End: 2025-01-13
Payer: MEDICAID

## 2025-01-13 VITALS
SYSTOLIC BLOOD PRESSURE: 123 MMHG | WEIGHT: 290 LBS | HEIGHT: 70 IN | HEART RATE: 70 BPM | BODY MASS INDEX: 41.52 KG/M2 | OXYGEN SATURATION: 96 % | DIASTOLIC BLOOD PRESSURE: 83 MMHG

## 2025-01-13 DIAGNOSIS — Z47.89 AFTERCARE FOLLOWING SURGERY OF THE MUSCULOSKELETAL SYSTEM: Primary | ICD-10-CM

## 2025-01-13 PROCEDURE — 3074F SYST BP LT 130 MM HG: CPT | Performed by: PHYSICIAN ASSISTANT

## 2025-01-13 PROCEDURE — 1159F MED LIST DOCD IN RCRD: CPT | Performed by: PHYSICIAN ASSISTANT

## 2025-01-13 PROCEDURE — 3079F DIAST BP 80-89 MM HG: CPT | Performed by: PHYSICIAN ASSISTANT

## 2025-01-13 PROCEDURE — 99024 POSTOP FOLLOW-UP VISIT: CPT | Performed by: PHYSICIAN ASSISTANT

## 2025-01-13 PROCEDURE — 1160F RVW MEDS BY RX/DR IN RCRD: CPT | Performed by: PHYSICIAN ASSISTANT

## 2025-01-13 NOTE — PROGRESS NOTES
"Chief Complaint  Follow-up of the Right Knee, Follow-up of the Left Knee, and Suture / Staple Removal    Subjective          History of Present Illness      Robert Valdez is a 49 y.o. male  presents to [facility] for     Patient presents for 2-week postop evaluation of bilateral knee arthroscopic partial medial meniscectomy, chondroplasty and debridement, 12/30/2024.  Patient states he has been recovering well he states pain that he had prior to surgery has improved he states his knees are \"better than before his surgery \".  Patient is attending therapy at [facility].  He denies need for pain medication but states that he did have episode where he slipped on some ice within the last few days and twisted his knee he states he has a burning pain to the medial knee but states that it is tolerable.  He states the incisions have been healing well denies redness drainage or dehiscence he also denies calf pain.  He presents without assistive ambulatory devices.      No Known Allergies     Social History     Socioeconomic History    Marital status:    Tobacco Use    Smoking status: Never    Smokeless tobacco: Never   Vaping Use    Vaping status: Never Used   Substance and Sexual Activity    Alcohol use: Not Currently    Drug use: Never    Sexual activity: Defer        REVIEW OF SYSTEMS    Constitutional: Awake alert and oriented x3, no acute distress, denies fevers, chills, weight loss  Respiratory: No respiratory distress  Vascular: Brisk cap refill, Intact distal pulses, No cyanosis, compartments soft with no signs or symptoms of compartment syndrome or DVT.   Cardiovascular: Denies chest pain, shortness of breath  Skin: Denies rashes, acute skin changes  Neurologic: Denies headache, loss of consciousness  MSK: Bilateral knee pain      Objective   Vital Signs:   /83   Pulse 70   Ht 177.8 cm (70\")   Wt 132 kg (290 lb)   SpO2 96%   BMI 41.61 kg/m²     Body " mass index is 41.61 kg/m².    Physical Exam    Right knee: Incisions are healing well, no erythema, no ecchymosis, no swelling, no effusion, no signs of infection, full extension, flexion 120, stable anterior/posterior drawer, stable to varus/valgus stress.  Nontender to palpation, no pain with range of motion.  Nontender calf, negative Gee testing    Left knee:Incisions are healing well, no erythema, no ecchymosis, no swelling, no effusion, no signs of infection, full extension, flexion 120, stable anterior/posterior drawer, stable to varus/valgus stress.  Nontender to palpation, no pain with range of motion.  Nontender calf, negative Gee testing      Procedures    Imaging Results (Most Recent)       None             Result Review :   The following data was reviewed by: HOMAR Tee on 01/13/2025:               Assessment and Plan    Diagnoses and all orders for this visit:    1. Aftercare following surgery of BILATERAL  KNEE ARTHROSCOPY WITH PARTIAL MEDIAL MENISCECTOMY, chondroplasty and debridement 12/30/24 (Primary)        Reviewed operative images and operative report with the patient discussed diagnosis and treatment options with him he was advised to continue therapy for strength and range of motion, continue pain meds or Tylenol as needed, weightbearing as tolerated, avoid strenuous activities, sutures/staples removed in office today. Steri-strips applied. Discussed incision care/hygiene. May shower, but do not submerge in water until fully healed.  Advised patient that if any concerning symptoms regarding incision appearance occur that they should call us right away, patient expressed understanding.  Follow-up in 4 weeks for recheck.    Call or return if worsening symptoms.    Follow Up   Return in about 4 weeks (around 2/10/2025) for Recheck.  Patient was given instructions and counseling regarding his condition or for health maintenance advice. Please see specific information pulled  into the AVS if appropriate.       EMR Dragon/Transcription disclaimer:  Part of this note may be an electronic transcription/translation of spoken language to printed text using the Dragon Dictation System

## 2025-02-17 ENCOUNTER — OFFICE VISIT (OUTPATIENT)
Dept: ORTHOPEDIC SURGERY | Facility: CLINIC | Age: 50
End: 2025-02-17
Payer: MEDICAID

## 2025-02-17 VITALS
WEIGHT: 280 LBS | SYSTOLIC BLOOD PRESSURE: 143 MMHG | OXYGEN SATURATION: 95 % | HEIGHT: 70 IN | BODY MASS INDEX: 40.09 KG/M2 | DIASTOLIC BLOOD PRESSURE: 91 MMHG | HEART RATE: 71 BPM

## 2025-02-17 DIAGNOSIS — Z47.89 AFTERCARE FOLLOWING SURGERY OF THE MUSCULOSKELETAL SYSTEM: Primary | ICD-10-CM

## 2025-02-17 PROCEDURE — 1159F MED LIST DOCD IN RCRD: CPT | Performed by: PHYSICIAN ASSISTANT

## 2025-02-17 PROCEDURE — 3077F SYST BP >= 140 MM HG: CPT | Performed by: PHYSICIAN ASSISTANT

## 2025-02-17 PROCEDURE — 1160F RVW MEDS BY RX/DR IN RCRD: CPT | Performed by: PHYSICIAN ASSISTANT

## 2025-02-17 PROCEDURE — 99024 POSTOP FOLLOW-UP VISIT: CPT | Performed by: PHYSICIAN ASSISTANT

## 2025-02-17 PROCEDURE — 3080F DIAST BP >= 90 MM HG: CPT | Performed by: PHYSICIAN ASSISTANT

## 2025-02-17 NOTE — PROGRESS NOTES
"Chief Complaint  Follow-up of the Right Knee and Follow-up of the Left Knee    Subjective          History of Present Illness      Robert Valdez is a 50 y.o. male  presents to [facility] for     Patient presents for follow-up evaluation of bilateral knee pain and bilateral knee arthroscopic partial medial meniscectomy chondroplasty and debridement, 12/30/2024.  He is here with his wife Debora.  Patient states that his knees have been doing well he states therapy has been going good he thinks he has been progressing well and may finish this week.  He states a few weeks ago he slipped and hyperextended his right knee he states therapy has been using K tape and helping him and states that his knee is better.  He states compared to prior to his surgery his knees are feeling better but he states that with overuse he feels pain especially in the anterior knee of the right knee.  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      No Known Allergies     Social History     Socioeconomic History    Marital status:    Tobacco Use    Smoking status: Never    Smokeless tobacco: Never   Vaping Use    Vaping status: Never Used   Substance and Sexual Activity    Alcohol use: Not Currently    Drug use: Never    Sexual activity: Defer        REVIEW OF SYSTEMS    Constitutional: Awake alert and oriented x3, no acute distress, denies fevers, chills, weight loss  Respiratory: No respiratory distress  Vascular: Brisk cap refill, Intact distal pulses, No cyanosis, compartments soft with no signs or symptoms of compartment syndrome or DVT.   Cardiovascular: Denies chest pain, shortness of breath  Skin: Denies rashes, acute skin changes  Neurologic: Denies headache, loss of consciousness  MSK: Bilateral knee pain      Objective   Vital Signs:   /91   Pulse 71   Ht 177.8 cm (70\")   Wt 127 kg (280 lb)   SpO2 95%   BMI " 40.18 kg/m²     Body mass index is 40.18 kg/m².    Physical Exam       Bilateral knees: Well-healed incisions, no erythema, no ecchymosis or swelling, no effusion or signs of infection full extension flexion 120 bilaterally 5 out of 5 strength, neurovascularly intact, nontender calf, negative Gee testing.      Procedures    Imaging Results (Most Recent)       None             Result Review :   The following data was reviewed by: HOMAR Tee on 02/17/2025:               Assessment and Plan    Diagnoses and all orders for this visit:    1. Aftercare following surgery of BILATERAL  KNEE ARTHROSCOPY WITH PARTIAL MEDIAL MENISCECTOMY, chondroplasty and debridement 12/30/24 (Primary)        Discussed diagnosis and treatment options with the patient he was advised to continue activity as tolerated, he may finish therapy at any time, weightbearing as tolerated follow-up as needed.    Call or return if worsening symptoms.    Follow Up   Return if symptoms worsen or fail to improve.  Patient was given instructions and counseling regarding his condition or for health maintenance advice. Please see specific information pulled into the AVS if appropriate.       EMR Dragon/Transcription disclaimer:  Part of this note may be an electronic transcription/translation of spoken language to printed text using the Dragon Dictation System

## 2025-05-01 ENCOUNTER — HOSPITAL ENCOUNTER (OUTPATIENT)
Dept: ULTRASOUND IMAGING | Facility: HOSPITAL | Age: 50
Discharge: HOME | End: 2025-05-01
Payer: MEDICAID

## 2025-05-01 DIAGNOSIS — K76.0 FATTY LIVER: ICD-10-CM

## 2025-05-01 PROCEDURE — 76705 ECHO EXAM OF ABDOMEN: CPT

## 2025-05-09 ENCOUNTER — OFFICE VISIT (OUTPATIENT)
Dept: ORTHOPEDIC SURGERY | Facility: CLINIC | Age: 50
End: 2025-05-09
Payer: MEDICAID

## 2025-05-09 VITALS — HEIGHT: 70 IN | WEIGHT: 275 LBS | BODY MASS INDEX: 39.37 KG/M2

## 2025-05-09 DIAGNOSIS — M17.12 PRIMARY OSTEOARTHRITIS OF LEFT KNEE: ICD-10-CM

## 2025-05-09 DIAGNOSIS — M17.0 PRIMARY OSTEOARTHRITIS OF KNEES, BILATERAL: ICD-10-CM

## 2025-05-09 DIAGNOSIS — M17.11 PRIMARY OSTEOARTHRITIS OF RIGHT KNEE: Primary | ICD-10-CM

## 2025-05-09 PROCEDURE — 99214 OFFICE O/P EST MOD 30 MIN: CPT | Performed by: ORTHOPAEDIC SURGERY

## 2025-05-09 PROCEDURE — 20610 DRAIN/INJ JOINT/BURSA W/O US: CPT | Performed by: ORTHOPAEDIC SURGERY

## 2025-05-09 RX ADMIN — LIDOCAINE HYDROCHLORIDE 5 ML: 10 INJECTION, SOLUTION INFILTRATION; PERINEURAL at 09:45

## 2025-05-09 RX ADMIN — TRIAMCINOLONE ACETONIDE 40 MG: 40 INJECTION, SUSPENSION INTRA-ARTICULAR; INTRAMUSCULAR at 09:45

## 2025-05-09 RX ADMIN — LIDOCAINE HYDROCHLORIDE 5 ML: 10 INJECTION, SOLUTION INFILTRATION; PERINEURAL at 09:46

## 2025-05-09 RX ADMIN — TRIAMCINOLONE ACETONIDE 40 MG: 40 INJECTION, SUSPENSION INTRA-ARTICULAR; INTRAMUSCULAR at 09:46

## 2025-05-09 NOTE — PROGRESS NOTES
"Chief Complaint  Follow-up of the Right Knee and Follow-up of the Left Knee       Subjective      Robert Valdez presents to [facility] for a follow up for bilateral knee. He underwent bilateral knee arthroscopy with partial medial  meniscectomy, chondroplasty and debridement performed on 12/30/24. He is still having pain with prolonged walking and going up and down stairs. He has stiffness and tightness to his knee's. His right knee is worse than his left knee.     No Known Allergies     Social History     Socioeconomic History    Marital status:    Tobacco Use    Smoking status: Never    Smokeless tobacco: Never   Vaping Use    Vaping status: Never Used   Substance and Sexual Activity    Alcohol use: Not Currently    Drug use: Never    Sexual activity: Defer        I reviewed the patient's chief complaint, history of present illness, review of systems, past medical history, surgical history, family history, social history, medications, and allergy list.     Review of Systems     Constitutional: Denies fevers, chills, weight loss  Cardiovascular: Denies chest pain, shortness of breath  Skin: Denies rashes, acute skin changes  Neurologic: Denies headache, loss of consciousness  MSK: bilateral knee pain       Vital Signs:   Ht 177.8 cm (70\")   Wt 125 kg (275 lb)   BMI 39.46 kg/m²            Ortho Exam    Physical Exam  General:Alert. No acute distress   Bilateral lower extremity: well healed surgicial incision, mild swelling, no effusion, tender to the medial joint line  and lateral joint line, stable to varus/valgus stress, stable to anterior/posterior drawer , negative  Lachman's, pain with Morena's, -5 degrees extension, flexion  to 120 degrees, distal neurovascularly intact, positive  pulses, positive EHL, FHL, GS, and TA. Sensation intact to all 5 nerves of the foot.      Large Joint: R knee  Date/Time: 5/9/2025 9:45 AM  Consent given by: patient  Site marked: site " marked  Timeout: Immediately prior to procedure a time out was called to verify the correct patient, procedure, equipment, support staff and site/side marked as required   Supporting Documentation  Indications: pain   Procedure Details  Location: knee - R knee  Preparation: Patient was prepped and draped in the usual sterile fashion  Needle gauge: 21 G.  Medications administered: 40 mg triamcinolone acetonide 40 MG/ML; 5 mL lidocaine 1 %  Patient tolerance: patient tolerated the procedure well with no immediate complications      Large Joint: L knee  Date/Time: 5/9/2025 9:46 AM  Consent given by: patient  Site marked: site marked  Timeout: Immediately prior to procedure a time out was called to verify the correct patient, procedure, equipment, support staff and site/side marked as required   Supporting Documentation  Indications: pain   Procedure Details  Location: knee - L knee  Preparation: Patient was prepped and draped in the usual sterile fashion  Needle gauge: 21 G.  Medications administered: 40 mg triamcinolone acetonide 40 MG/ML; 5 mL lidocaine 1 %  Patient tolerance: patient tolerated the procedure well with no immediate complications    This injection documentation was Scribed for Tal Skinner MD by Sharmaine Tian MA.  05/09/25   09:46 EDT   X-Ray Report:  Right knee X-Ray  Indication: Evaluation of right knee pain   AP/Lateral and Standing view(s)  Findings: moderate degenerative changes with medial  joint space narrowing , no acute fracture or effusion   Prior studies available for comparison: yes     X-Ray Report:  Left knee X-Ray  Indication: Evaluation of left knee pain   AP/Lateral and Standing view(s)  Findings: moderately advanced degenerative changes with medial  joint space narrowing , no acute fracture or effusion   Prior studies available for comparison: yes       Imaging Results (Most Recent)       Procedure Component Value Units Date/Time    XR Knee 3 View Right [[id number]]  Resulted: 05/09/25 0844     Updated: 05/09/25 0847    XR Knee 3 View Left [[id number]] Resulted: 05/09/25 0844     Updated: 05/09/25 0847             Result Review :       US Liver  Result Date: 5/1/2025  Narrative: ULTRASOUND ABDOMEN LIMITED (RIGHT UPPER QUADRANT) Clinical History: Fatty liver Comparison: CT 6/2/2024 Technique: Right upper quadrant sonography Findings: Liver: Enlarged. Echogenic. No intrahepatic biliary ductal dilatation. Portal veins with normal directional flow. Hepatic veins with normal directional flow. No liver mass identified. Common Duct: Normal caliber. 4 mm. Gallbladder: No stones. Sludge. No wall thickening. No pericholecystic fluid. Pancreas: Obscured by bowel gas. Right kidney: Unremarkable. Retrohepatic IVC: Unremarkable.     Impression: 1.Hepatic steatosis and/or diffuse hepatocellular disease. Electronically Signed: Fercho Reyes MD  5/1/2025 10:18 AM EDT  Workstation ID: [id number]             Assessment and Plan     Diagnoses and all orders for this visit:    1. Primary osteoarthritis of right knee (Primary)  -     XR Knee 3 View Right    2. Primary osteoarthritis of left knee  -     XR Knee 3 View Left    3. Primary osteoarthritis of knees, bilateral        The patient presents here today for a follow up for bilateral knee. X-rays were obtained in the office today and these were reviewed today.     Discussed operative treatment options regarding a left knee arthroplasty with nata versus non operative treatment options regarding injections, medications and therapy.     Patient wishes to proceed with conservative treatment options at this time.     Discussed the risks and benefits of bilateral knee steroid injections today in the office. Patient expressed understanding and wishes to proceed. Patient tolerted the injection well and without any complications.     Home exercises given today and will continue current medications for pain control.      He will be placed into normal knee  braces today.     Call or return if worsening symptoms.    Follow Up     3 months     Patient was given instructions and counseling regarding his condition or for health maintenance advice. Please see specific information pulled into the AVS if appropriate.     Scribed for Tal Skinner MD by Mayra Workman.  05/09/25   08:40 EDT    I have personally performed the services described in this document as scribed by the above individual and it is both accurate and complete. Tal Skinner MD 05/12/25

## 2025-05-12 RX ORDER — TRIAMCINOLONE ACETONIDE 40 MG/ML
40 INJECTION, SUSPENSION INTRA-ARTICULAR; INTRAMUSCULAR
Status: COMPLETED | OUTPATIENT
Start: 2025-05-09 | End: 2025-05-09

## 2025-05-12 RX ORDER — LIDOCAINE HYDROCHLORIDE 10 MG/ML
5 INJECTION, SOLUTION INFILTRATION; PERINEURAL
Status: COMPLETED | OUTPATIENT
Start: 2025-05-09 | End: 2025-05-09

## 2025-05-21 ENCOUNTER — TELEPHONE (OUTPATIENT)
Dept: GASTROENTEROLOGY | Facility: CLINIC | Age: 50
End: 2025-05-21
Payer: MEDICAID

## 2025-05-21 ENCOUNTER — LAB (OUTPATIENT)
Dept: LAB | Facility: HOSPITAL | Age: 50
End: 2025-05-21
Payer: MEDICAID

## 2025-05-21 DIAGNOSIS — K76.0 FATTY LIVER: ICD-10-CM

## 2025-05-21 LAB
ALBUMIN SERPL-MCNC: 4.2 G/DL (ref 3.5–5.2)
ALP SERPL-CCNC: 95 U/L (ref 39–117)
ALT SERPL W P-5'-P-CCNC: 22 U/L (ref 1–41)
AST SERPL-CCNC: 20 U/L (ref 1–40)
BILIRUB CONJ SERPL-MCNC: 0.1 MG/DL (ref 0–0.3)
BILIRUB INDIRECT SERPL-MCNC: 0.3 MG/DL
BILIRUB SERPL-MCNC: 0.4 MG/DL (ref 0–1.2)
DEPRECATED RDW RBC AUTO: 42.9 FL (ref 37–54)
ERYTHROCYTE [DISTWIDTH] IN BLOOD BY AUTOMATED COUNT: 13.9 % (ref 12.3–15.4)
HCT VFR BLD AUTO: 46.2 % (ref 37.5–51)
HGB BLD-MCNC: 14.9 G/DL (ref 13–17.7)
INR PPP: 0.91 (ref 0.86–1.15)
MCH RBC QN AUTO: 27.2 PG (ref 26.6–33)
MCHC RBC AUTO-ENTMCNC: 32.3 G/DL (ref 31.5–35.7)
MCV RBC AUTO: 84.5 FL (ref 79–97)
PLATELET # BLD AUTO: 351 10*3/MM3 (ref 140–450)
PMV BLD AUTO: 10.7 FL (ref 6–12)
PROT SERPL-MCNC: 7.7 G/DL (ref 6–8.5)
PROTHROMBIN TIME: 12.6 SECONDS (ref 11.8–14.9)
RBC # BLD AUTO: 5.47 10*6/MM3 (ref 4.14–5.8)
WBC NRBC COR # BLD AUTO: 11 10*3/MM3 (ref 3.4–10.8)

## 2025-05-21 PROCEDURE — 80076 HEPATIC FUNCTION PANEL: CPT

## 2025-05-21 PROCEDURE — 36415 COLL VENOUS BLD VENIPUNCTURE: CPT

## 2025-05-21 PROCEDURE — 85610 PROTHROMBIN TIME: CPT

## 2025-05-21 PROCEDURE — 85027 COMPLETE CBC AUTOMATED: CPT

## 2025-06-02 ENCOUNTER — OFFICE VISIT (OUTPATIENT)
Dept: GASTROENTEROLOGY | Facility: CLINIC | Age: 50
End: 2025-06-02
Payer: MEDICAID

## 2025-06-02 ENCOUNTER — LAB (OUTPATIENT)
Dept: LAB | Facility: HOSPITAL | Age: 50
End: 2025-06-02
Payer: MEDICAID

## 2025-06-02 VITALS
HEIGHT: 70 IN | SYSTOLIC BLOOD PRESSURE: 150 MMHG | BODY MASS INDEX: 39.91 KG/M2 | WEIGHT: 278.8 LBS | HEART RATE: 65 BPM | DIASTOLIC BLOOD PRESSURE: 91 MMHG

## 2025-06-02 DIAGNOSIS — R53.83 MALAISE AND FATIGUE: ICD-10-CM

## 2025-06-02 DIAGNOSIS — R53.81 MALAISE AND FATIGUE: ICD-10-CM

## 2025-06-02 DIAGNOSIS — K76.0 HEPATIC STEATOSIS: Primary | ICD-10-CM

## 2025-06-02 LAB
CHOLEST SERPL-MCNC: 156 MG/DL (ref 0–200)
FERRITIN SERPL-MCNC: 106 NG/ML (ref 30–400)
HDLC SERPL-MCNC: 46 MG/DL (ref 40–60)
IRON 24H UR-MRATE: 64 MCG/DL (ref 59–158)
IRON SATN MFR SERPL: 16 % (ref 20–50)
LDLC SERPL CALC-MCNC: 96 MG/DL (ref 0–100)
LDLC/HDLC SERPL: 2.08 {RATIO}
TIBC SERPL-MCNC: 401 MCG/DL (ref 298–536)
TRANSFERRIN SERPL-MCNC: 269 MG/DL (ref 200–360)
TRIGL SERPL-MCNC: 71 MG/DL (ref 0–150)
TSH SERPL DL<=0.05 MIU/L-ACNC: 1.2 UIU/ML (ref 0.27–4.2)
VLDLC SERPL-MCNC: 14 MG/DL (ref 5–40)

## 2025-06-02 PROCEDURE — 3080F DIAST BP >= 90 MM HG: CPT

## 2025-06-02 PROCEDURE — 1160F RVW MEDS BY RX/DR IN RCRD: CPT

## 2025-06-02 PROCEDURE — 82728 ASSAY OF FERRITIN: CPT

## 2025-06-02 PROCEDURE — 84443 ASSAY THYROID STIM HORMONE: CPT

## 2025-06-02 PROCEDURE — 3077F SYST BP >= 140 MM HG: CPT

## 2025-06-02 PROCEDURE — 1159F MED LIST DOCD IN RCRD: CPT

## 2025-06-02 PROCEDURE — 83540 ASSAY OF IRON: CPT

## 2025-06-02 PROCEDURE — 80061 LIPID PANEL: CPT

## 2025-06-02 PROCEDURE — 84466 ASSAY OF TRANSFERRIN: CPT

## 2025-06-02 PROCEDURE — 36415 COLL VENOUS BLD VENIPUNCTURE: CPT

## 2025-06-02 PROCEDURE — 99213 OFFICE O/P EST LOW 20 MIN: CPT

## 2025-06-02 RX ORDER — METOPROLOL SUCCINATE 25 MG/1
25 TABLET, EXTENDED RELEASE ORAL DAILY
COMMUNITY
Start: 2025-03-19 | End: 2025-06-17

## 2025-06-02 RX ORDER — ASPIRIN 81 MG/1
TABLET, COATED ORAL
COMMUNITY
Start: 2025-05-17

## 2025-06-02 NOTE — PROGRESS NOTES
Chief Complaint     Fatty Liver (6 month F/U, -26#)    Patient or patient representative verbalized consent for the use of Ambient Listening during the visit with  ADRIAN Maurer for chart documentation. 6/3/2025  08:13 EDT      History of Present Illness     Robert Valdez is a 50 y.o. male who presents to [facility] for follow-up of fatty liver and GERD.    History of Present Illness  The patient is a 50-year-old male who presents for evaluation of fatty liver, urinary frequency, and stage 4 arthritis in his knees.    He has been adhering to a predominantly meat-based diet, resulting in a significant weight loss of 26 pounds, from 304 to 278 pounds. He abstains from alcohol and engages in minimal physical activity due to persistent knee pain. He has not undergone a FibroScan and reports no right upper quadrant pain. He also reports fatigue and sleepiness. His last blood work revealed an elevated white blood cell count.    He continues to experience frequent urination, necessitating 2 to 3 bathroom visits before bedtime, with only small amounts of urine each time. During the day, he needs to urinate every hour, which is challenging given his occupation as a . A year ago, he was prescribed medication for this issue, but it did not provide relief.    His orthopedic surgeon has advised him to apply for medical disability due to his knee condition, which is currently not amenable to surgical intervention. He has stage 4 arthritis in his knees and is considering a second opinion.    He takes ibuprofen and Tylenol 300 mg of each once every other week.    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  Exercise: Very little physical activity due to knee issues.  Diet: Primarily consumes a meat-based diet.  Alcohol: Does not consume alcohol.  Coffee/Tea/Caffeine-containing Drinks: Occasionally drinks tea.    5/1/2025  "Ultrasound of the abdomen limited -hepatic steatosis and/or diffuse hepatocellular disease.    12/2/2024 Office visit: Patient explains that he has made dietary changes after our last visit. He has decreased soft drink intake to only 2 a month, as well as decreasing intake of sugars, carbs, and red meats. He reports some frustration due to continued to gain weight (+6 lbs since last visit). 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 He has tried Ozempic but had to discontinue due to increased constipation and diarrhea. He is now on oral Semaglutide 14 mg. This is his second month on the oral Semaglutide but only the second week of the higher dose. Denies RUQ pain, alcohol use, IV drug use, unprofessional tattoos, history of or exposure to hepatitis, history of blood transfusions, and family history of liver disease.      No reports of nausea, vomiting, or hematemesis. Denies dysphagia. Reports heartburn is mostly controlled with Protonix but would like to try to come off the medication. Denies hematochezia, melena, or abdominal pain.     10/11/2025 Liver work-up date completed  ASMA, AMA, JANIYA-all negative, immunofixation WNL  Alpha-1 antitrypsin 160  Ceruloplasmin 23, copper 84  Iron 78, iron saturation 20, TIBC 386, ferritin 107  Acute hepatitis panel - non reactive    11/25/2024 Colonoscopy performed by Dr. Zaragoza. The report showed a normal colonoscopy.  No polyps removed or specimens collected.  Repeat colonoscopy in 10 years.     7/22/2024 initial office visit -patient presents with history of ischemic colitis, iron deficiency anemia, and fatty liver. Denies any nausea vomiting, or heartburn. Denies abdominal pain, tarry stools or bright red blood in stool.  Patient reports having 1-2 a BM/day, no straining, formed soft brown.      Negative family history of colon cancer or colon polyps.  Last " colonoscopy 5/25/2024 Linear mucosal ulcerations from this rectum up to the descending colon and the sigmoid colon deeply ulcerated mucosa with sloughing and deep laceration 2 hemostatic clips placed.  Random colon biopsy showed ischemic colitis.  Repeat colonoscopy in 6 months (colonoscopy scheduled 11/25/2024). EGD 5/25/2024 erythematous mucosa in the gastric body noted.       History      Past Medical History:   Diagnosis Date    Diabetes mellitus     PT ON GLP1 ORAL DOSE, SURGEON OFF INST HIM TO HOLD 1 WEEK, LD 12/24/24    Fatty liver     GERD (gastroesophageal reflux disease)     Hypertension     Knee pain     BILATERAL KNEES    Urinary incontinence      Past Surgical History:   Procedure Laterality Date    CEREBRAL ANEURYSM REPAIR      COIL/STENTS X2 1/2024, NO RESIDUAL ISSUES    COLON SURGERY      COLONOSCOPY N/A 05/25/2024    Procedure: COLONOSCOPY WITH BIOPSIES AND CLIP APPLICATION X 2;  Surgeon: Axel Zaragoza MD;  Location: [facility] OR;  Service: Gastroenterology;  Laterality: N/A;  COLON ERROSION, COLITIS WITH ULCERATION    COLONOSCOPY N/A 11/25/2024    Procedure: COLONOSCOPY;  Surgeon: Axel Zaragoza MD;  Location: [facility] ENDOSCOPY;  Service: Gastroenterology;  Laterality: N/A;  NORMAL COLONOSCOPY    ENDOSCOPY N/A 05/25/2024    Procedure: ESOPHAGOGASTRODUODENOSCOPY;  Surgeon: Axel Zaragoza MD;  Location: [facility] MAIN OR;  Service: Gastroenterology;  Laterality: N/A;  MILD GASTRITIS    KIDNEY STONE SURGERY      KNEE ARTHROSCOPY Bilateral 12/30/2024    Procedure: BILATERAL  KNEE ARTHROSCOPY WITH PARTIAL MEDIAL MENISCECTOMY, chondroplasty and debridement;  Surgeon: Tal Skinner MD;  Location: [facility] [place] [facility];  Service: Orthopedics;  Laterality: Bilateral;    URETEROSCOPY LASER LITHOTRIPSY WITH STENT INSERTION Left 05/27/2024    Procedure: Cystoscopy with left ureteroscopy with laser and left ureteral left ureteral stent placement.;  Surgeon: Omid Bardales MD;  Location: [facility]  "MAIN OR;  Service: Urology;  Laterality: Left;     Family History   Problem Relation Age of Onset    Hypertension Father     Kidney disease Father     Colon cancer Neg Hx     Malig Hyperthermia Neg Hx         Current Medications       Current Outpatient Medications:     Aspirin Low Dose 81 MG EC tablet, , Disp: , Rfl:     losartan-hydrochlorothiazide (HYZAAR) 100-25 MG per tablet, Take 1 tablet by mouth Daily., Disp: , Rfl:     metoprolol succinate XL (TOPROL-XL) 25 MG 24 hr tablet, Take 1 tablet by mouth Daily., Disp: , Rfl:     vitamin D (ERGOCALCIFEROL) 1.25 MG (09860 UT) capsule capsule, Take 1 tablet by mouth 1 (One) Time Per Week., Disp: , Rfl:      Allergies     No Known Allergies    Social History       Social History     Social History Narrative    Not on file         Objective       /91 (BP Location: Left arm, Patient Position: Sitting, Cuff Size: Adult)   Pulse 65   Ht 177.8 cm (70\")   Wt 126 kg (278 lb 12.8 oz)   BMI 40.00 kg/m²       Physical Exam  HENT:      Head: Normocephalic.   Cardiovascular:      Rate and Rhythm: Normal rate.   Musculoskeletal:         General: Normal range of motion.   Neurological:      General: No focal deficit present.      Mental Status: He is alert.               Results       Result Review :    The following data was reviewed by: ADRIAN Maurer on 06/02/2025:    Lab Results - Last 18 Months   Lab Units 05/21/25  1228 10/11/24  1024 06/20/24  0840   WBC 10*3/mm3 11.00* 7.73 7.46   HEMOGLOBIN g/dL 14.9 13.8 11.6*   MCV fL 84.5 83.8 85.2   PLATELETS 10*3/mm3 351 288 291         Lab Results - Last 18 Months   Lab Units 12/30/24  0630 06/02/24  0610 05/29/24  0620   BUN mg/dL 18 26* 23*   CREATININE mg/dL 1.08 1.48* 1.65*   SODIUM mmol/L 138 139 136   POTASSIUM mmol/L 3.4* 3.3* 3.3*   CHLORIDE mmol/L 98 100 97*   CO2 mmol/L 30.5* 26.6 29.8*   GLUCOSE mg/dL 155* 150* 112*      Lab Results - Last 18 Months   Lab Units 05/21/25  1228 10/11/24  1024 " 06/20/24  0840   PROTIME Seconds 12.6 12.4 11.9   INR  0.91 0.91 1.1   APTT s  --   --  30.3     Lab Results - Last 18 Months   Lab Units 05/21/25  1228 10/11/24  1024 06/02/24  0610 05/29/24  0620   AST (SGOT) U/L 20  --  13 15   ALT (SGPT) U/L 22  --  17 17   ALK PHOS U/L 95  --  85 72   BILIRUBIN mg/dL 0.4  --  0.3 0.3   BILIRUBIN DIRECT mg/dL 0.1  --   --   --    TOTAL PROTEIN g/dL 7.7 7.0 7.0 6.7   ALBUMIN g/dL 4.2 3.8 4.0 3.6      Lab Results - Last 18 Months   Lab Units 06/02/25  0850 10/11/24  1024   IRON mcg/dL 64 78   TRANSFERRIN mg/dL 269 259   TIBC mcg/dL 401 386   FERRITIN ng/mL 106.00 107.00     Lab Results - Last 18 Months   Lab Units 10/11/24  1024   HEP A IGM  Non-Reactive       US Liver  Result Date: 5/1/2025  1.Hepatic steatosis and/or diffuse hepatocellular disease. Electronically Signed: Fercho Reyes MD  5/1/2025 10:18 AM EDT  Workstation ID: [id number]      Results  Labs   - White blood cell count: Elevated    Imaging   - Ultrasound of the liver: Fatty liver             Assessment and Plan              Diagnoses and all orders for this visit:    1. Hepatic steatosis (Primary)  -     Liver Elastography; Future  -     Lipid Panel    2. BMI 40.0-44.9, adult    3. Malaise and fatigue  -     Iron Profile w/o Ferritin; Future  -     TSH; Future  -     Ferritin        Assessment & Plan  1. Hepatic steatosis:  - Maintain a low-carbohydrate diet and limit intake of sugars and fried foods.  - Monitor cholesterol levels due to potential hypercholesterolemia associated with high-protein diets.  - Abstain from alcohol.  - Schedule FibroScan for 11/2025 to assess the degree of steatosis and fibrosis.  - Order iron profile and thyroid level tests to investigate fatigue and sleepiness.  - Check cholesterol levels.    2. Urinary frequency:  - Consult primary care physician for a referral to a urologist for further evaluation.    3. Stage IV arthritis in knees:  - Seek a second opinion regarding knee  condition.    Follow-up: 11/2025.        * Surgery not found *    Follow Up     Follow Up   Return in about 6 months (around 12/2/2025) for Fatty Liver Disease.    Patient was given instructions and counseling regarding his condition or for health maintenance advice. Please see specific information pulled into the AVS if appropriate.

## 2025-07-10 ENCOUNTER — TRANSCRIBE ORDERS (OUTPATIENT)
Dept: ADMINISTRATIVE | Facility: HOSPITAL | Age: 50
End: 2025-07-10
Payer: MEDICAID

## 2025-07-10 DIAGNOSIS — I10 ESSENTIAL HYPERTENSION, MALIGNANT: Primary | ICD-10-CM

## 2025-07-15 ENCOUNTER — HOSPITAL ENCOUNTER (OUTPATIENT)
Dept: CT IMAGING | Facility: HOSPITAL | Age: 50
Discharge: HOME | End: 2025-07-15
Admitting: NURSE PRACTITIONER

## 2025-07-15 DIAGNOSIS — I10 ESSENTIAL HYPERTENSION, MALIGNANT: ICD-10-CM

## 2025-07-15 PROCEDURE — 75571 CT HRT W/O DYE W/CA TEST: CPT

## 2025-08-15 ENCOUNTER — OFFICE VISIT (OUTPATIENT)
Dept: ORTHOPEDIC SURGERY | Facility: CLINIC | Age: 50
End: 2025-08-15
Payer: MEDICAID

## 2025-08-15 VITALS
HEART RATE: 69 BPM | SYSTOLIC BLOOD PRESSURE: 147 MMHG | WEIGHT: 278 LBS | BODY MASS INDEX: 39.8 KG/M2 | OXYGEN SATURATION: 94 % | HEIGHT: 70 IN | DIASTOLIC BLOOD PRESSURE: 97 MMHG

## 2025-08-15 DIAGNOSIS — M17.11 PRIMARY OSTEOARTHRITIS OF RIGHT KNEE: Primary | ICD-10-CM

## 2025-08-15 DIAGNOSIS — M17.12 PRIMARY OSTEOARTHRITIS OF LEFT KNEE: ICD-10-CM

## 2025-08-15 RX ORDER — LIDOCAINE HYDROCHLORIDE 10 MG/ML
5 INJECTION, SOLUTION INFILTRATION; PERINEURAL
Status: COMPLETED | OUTPATIENT
Start: 2025-08-15 | End: 2025-08-15

## 2025-08-15 RX ORDER — TRIAMCINOLONE ACETONIDE 40 MG/ML
40 INJECTION, SUSPENSION INTRA-ARTICULAR; INTRAMUSCULAR
Status: COMPLETED | OUTPATIENT
Start: 2025-08-15 | End: 2025-08-15

## 2025-08-15 RX ADMIN — LIDOCAINE HYDROCHLORIDE 5 ML: 10 INJECTION, SOLUTION INFILTRATION; PERINEURAL at 08:43

## 2025-08-15 RX ADMIN — TRIAMCINOLONE ACETONIDE 40 MG: 40 INJECTION, SUSPENSION INTRA-ARTICULAR; INTRAMUSCULAR at 08:43

## 2025-08-27 ENCOUNTER — TELEPHONE (OUTPATIENT)
Dept: GASTROENTEROLOGY | Facility: HOSPITAL | Age: 50
End: 2025-08-27
Payer: MEDICAID

## 2025-08-29 ENCOUNTER — TELEPHONE (OUTPATIENT)
Dept: OTHER | Facility: HOSPITAL | Age: 50
End: 2025-08-29
Payer: MEDICAID

## (undated) DEVICE — DRAPE,ORTHOMAX,ARTHRO T ,W/ POUCH: Brand: MEDLINE

## (undated) DEVICE — GW PTFE FIX/CORE STFF STR .038 3X150CM

## (undated) DEVICE — BNDG ELAS CO-FLEX SLF ADHR 6IN 5YD LF STRL

## (undated) DEVICE — TBG INFLOW/OUTFLOW DUALWAVE 1P/U

## (undated) DEVICE — CYSTO PACK: Brand: MEDLINE INDUSTRIES, INC.

## (undated) DEVICE — FIBR LASR HOLMIUM COMPAT 272MH DISP

## (undated) DEVICE — Device

## (undated) DEVICE — SOLIDIFIER LIQLOC PLS 1500CC BT

## (undated) DEVICE — KNEE ARTHROSCOPY-LF: Brand: MEDLINE INDUSTRIES, INC.

## (undated) DEVICE — LINER SURG CANSTR SXN S/RIGD 1500CC

## (undated) DEVICE — SYS IRR PUMP SGL ACTN VAC SYR 10CC

## (undated) DEVICE — CONN JET HYDRA H20 AUXILIARY DISP

## (undated) DEVICE — GW ZIPWIRE STD/SHFT STR TPR/3CM .038IN 150CM

## (undated) DEVICE — SKIN PREP TRAY W/CHG: Brand: MEDLINE INDUSTRIES, INC.

## (undated) DEVICE — GAUZE,SPONGE,4"X4",16PLY,STRL,LF,10/TRAY: Brand: MEDLINE

## (undated) DEVICE — DISPOSABLE DISTAL ATTACHMENT: Brand: DISPOSABLE DISTAL ATTACHMENT

## (undated) DEVICE — DISPOSABLE TOURNIQUET CUFF SINGLE BLADDER, SINGLE PORT AND QUICK CONNECT CONNECTOR: Brand: COLOR CUFF

## (undated) DEVICE — SOL IRR NACL 0.9PCT 3000ML

## (undated) DEVICE — BNDG ESMARK STRL 6INX12FT LF

## (undated) DEVICE — SINGLE-USE BIOPSY FORCEPS: Brand: RADIAL JAW 4

## (undated) DEVICE — BNDG ELAS ECON W/CLIP 6IN 5YD LF STRL

## (undated) DEVICE — DRAPE,U/ SHT,SPLIT,PLAS,STERIL: Brand: MEDLINE

## (undated) DEVICE — NITINOL STONE RETRIEVAL BASKET: Brand: ESCAPE

## (undated) DEVICE — SOL IRRG H2O PL/BG 1000ML STRL

## (undated) DEVICE — BASIC SINGLE BASIN-LF: Brand: MEDLINE INDUSTRIES, INC.

## (undated) DEVICE — GLV SURG SENSICARE PI ORTHO SZ8 LF STRL

## (undated) DEVICE — CP SEAL HYSTEROSCOPE OMNI 5F STRL

## (undated) DEVICE — ST BIAS STOCKINETTE: Brand: DEROYAL

## (undated) DEVICE — APPL CHLORAPREP HI/LITE 26ML ORNG

## (undated) DEVICE — BLD CUT FORMLA AGGR PLS 5.0MM

## (undated) DEVICE — URETERAL ACCESS SHEATH SET: Brand: NAVIGATOR HD

## (undated) DEVICE — Device: Brand: DEFENDO AIR/WATER/SUCTION AND BIOPSY VALVE

## (undated) DEVICE — DRESSING,GAUZE,XEROFORM,CURAD,1"X8",ST: Brand: CURAD

## (undated) DEVICE — Y-TYPE TUR/BLADDER IRRIGATION SET, REGULATING CLAMP

## (undated) DEVICE — TOWEL,OR,DSP,ST,BLUE,STD,4/PK,20PK/CS: Brand: MEDLINE

## (undated) DEVICE — CATH 2L URETRL HC 6F 50CM

## (undated) DEVICE — STERILE POLYISOPRENE POWDER-FREE SURGICAL GLOVES: Brand: PROTEXIS

## (undated) DEVICE — SUT ETHLN 3-0 FS118IN 663H

## (undated) DEVICE — BLCK/BITE BLOX WO/DENTL/RIM W/STRAP 54F

## (undated) DEVICE — GLOVE,SURG,SENSICARE SLT,LF,PF,8: Brand: MEDLINE